# Patient Record
Sex: MALE | Race: OTHER | NOT HISPANIC OR LATINO
[De-identification: names, ages, dates, MRNs, and addresses within clinical notes are randomized per-mention and may not be internally consistent; named-entity substitution may affect disease eponyms.]

---

## 2018-05-25 ENCOUNTER — APPOINTMENT (OUTPATIENT)
Dept: UROLOGY | Facility: CLINIC | Age: 73
End: 2018-05-25
Payer: MEDICARE

## 2018-05-25 VITALS
WEIGHT: 180 LBS | RESPIRATION RATE: 17 BRPM | HEIGHT: 66 IN | HEART RATE: 94 BPM | SYSTOLIC BLOOD PRESSURE: 151 MMHG | BODY MASS INDEX: 28.93 KG/M2 | DIASTOLIC BLOOD PRESSURE: 65 MMHG

## 2018-05-25 DIAGNOSIS — Z80.9 FAMILY HISTORY OF MALIGNANT NEOPLASM, UNSPECIFIED: ICD-10-CM

## 2018-05-25 DIAGNOSIS — Z86.39 PERSONAL HISTORY OF OTHER ENDOCRINE, NUTRITIONAL AND METABOLIC DISEASE: ICD-10-CM

## 2018-05-25 DIAGNOSIS — E78.5 HYPERLIPIDEMIA, UNSPECIFIED: ICD-10-CM

## 2018-05-25 PROCEDURE — 99213 OFFICE O/P EST LOW 20 MIN: CPT

## 2018-07-18 ENCOUNTER — RESULT REVIEW (OUTPATIENT)
Age: 73
End: 2018-07-18

## 2020-02-28 ENCOUNTER — APPOINTMENT (OUTPATIENT)
Dept: UROLOGY | Facility: CLINIC | Age: 75
End: 2020-02-28
Payer: MEDICARE

## 2020-02-28 VITALS
DIASTOLIC BLOOD PRESSURE: 80 MMHG | BODY MASS INDEX: 29.73 KG/M2 | OXYGEN SATURATION: 99 % | SYSTOLIC BLOOD PRESSURE: 148 MMHG | HEART RATE: 91 BPM | WEIGHT: 185 LBS | HEIGHT: 66 IN

## 2020-02-28 DIAGNOSIS — N28.1 CYST OF KIDNEY, ACQUIRED: ICD-10-CM

## 2020-02-28 PROCEDURE — 99214 OFFICE O/P EST MOD 30 MIN: CPT

## 2020-02-28 NOTE — ASSESSMENT
[FreeTextEntry1] : patient with hx of pca and complex cyst\par states had TITO done with [facility] -- will get report\par psa not done with do today with [facility]\par killian notify of results

## 2020-02-28 NOTE — PHYSICAL EXAM
[General Appearance - Well Developed] : well developed [Normal Appearance] : normal appearance [General Appearance - Well Nourished] : well nourished [Well Groomed] : well groomed [General Appearance - In No Acute Distress] : no acute distress [Abdomen Soft] : soft [] : no hepato-splenomegaly [Abdomen Tenderness] : non-tender [Abdomen Mass (___ Cm)] : no abdominal mass palpated [Costovertebral Angle Tenderness] : no ~M costovertebral angle tenderness [Abdomen Hernia] : no hernia was discovered [FreeTextEntry1] : pvr 14 ml , office TITO showed bilat cysts, left x 2 and right up , good flow bilat , no hydro or no stones [Cervical Lymph Nodes Enlarged Anterior Bilaterally] : anterior cervical [Cervical Lymph Nodes Enlarged Posterior Bilaterally] : posterior cervical [Supraclavicular Lymph Nodes Enlarged Bilaterally] : supraclavicular

## 2020-02-28 NOTE — HISTORY OF PRESENT ILLNESS
[FreeTextEntry1] : patient with hx of pca s/p seeds 2008 and complex renal cyst\par here for f/u\par last see May 2018 , no new issues, no voiding or bowel c/o\par no blood in urine or stool\par weight stabel

## 2020-03-02 LAB
APPEARANCE: CLEAR
BACTERIA: NEGATIVE
BILIRUBIN URINE: NEGATIVE
BLOOD URINE: NEGATIVE
COLOR: NORMAL
GLUCOSE QUALITATIVE U: ABNORMAL
HYALINE CASTS: 1 /LPF
KETONES URINE: NEGATIVE
LEUKOCYTE ESTERASE URINE: NEGATIVE
MICROSCOPIC-UA: NORMAL
NITRITE URINE: NEGATIVE
PH URINE: 5.5
PROTEIN URINE: NEGATIVE
PSA SERPL-MCNC: 0.02 NG/ML
RED BLOOD CELLS URINE: 1 /HPF
SPECIFIC GRAVITY URINE: 1.01
SQUAMOUS EPITHELIAL CELLS: 1 /HPF
UROBILINOGEN URINE: NORMAL
WHITE BLOOD CELLS URINE: 0 /HPF

## 2022-04-11 ENCOUNTER — APPOINTMENT (OUTPATIENT)
Dept: GASTROENTEROLOGY | Facility: CLINIC | Age: 77
End: 2022-04-11
Payer: MEDICARE

## 2022-04-11 VITALS
WEIGHT: 171 LBS | OXYGEN SATURATION: 98 % | HEIGHT: 66 IN | TEMPERATURE: 98.4 F | DIASTOLIC BLOOD PRESSURE: 77 MMHG | SYSTOLIC BLOOD PRESSURE: 142 MMHG | BODY MASS INDEX: 27.48 KG/M2 | HEART RATE: 104 BPM

## 2022-04-11 DIAGNOSIS — K31.89 OTHER DISEASES OF STOMACH AND DUODENUM: ICD-10-CM

## 2022-04-11 DIAGNOSIS — K31.A0 GASTRIC INTESTINAL METAPLASIA, UNSPECIFIED: ICD-10-CM

## 2022-04-11 PROCEDURE — 99204 OFFICE O/P NEW MOD 45 MIN: CPT

## 2022-04-11 NOTE — PHYSICAL EXAM
[General Appearance - Alert] : alert [General Appearance - In No Acute Distress] : in no acute distress [Sclera] : the sclera and conjunctiva were normal [PERRL With Normal Accommodation] : pupils were equal in size, round, and reactive to light [Extraocular Movements] : extraocular movements were intact [Outer Ear] : the ears and nose were normal in appearance [Oropharynx] : the oropharynx was normal [Neck Appearance] : the appearance of the neck was normal [Neck Cervical Mass (___cm)] : no neck mass was observed [Jugular Venous Distention Increased] : there was no jugular-venous distention [Auscultation Breath Sounds / Voice Sounds] : lungs were clear to auscultation bilaterally [Heart Rate And Rhythm] : heart rate was normal and rhythm regular [Heart Sounds] : normal S1 and S2 [Heart Sounds Gallop] : no gallops [Murmurs] : no murmurs [Heart Sounds Pericardial Friction Rub] : no pericardial rub [Edema] : there was no peripheral edema [Bowel Sounds] : normal bowel sounds [Abdomen Soft] : soft [Abdomen Tenderness] : non-tender [] : no hepato-splenomegaly [Abdomen Mass (___ Cm)] : no abdominal mass palpated [Abnormal Walk] : normal gait [Skin Color & Pigmentation] : normal skin color and pigmentation [No Focal Deficits] : no focal deficits [Oriented To Time, Place, And Person] : oriented to person, place, and time

## 2022-04-11 NOTE — ASSESSMENT
[FreeTextEntry1] : 76M with pmhx of DM2, prostate cancer s/p seeds (2008), renal cyst, HLD presenting for evaluation of gastric nodule with intestinal metaplasia. Referred by Dr. Pressley. Had EGD 3/15/2022 for gastric IM surveillance found to have pancreatic rest, gastritis, and antral nodules. Biopsies from the antral nodule showed hyperplastic polyp, intestinal metaplasia with low grade dysplasia. Referred for endoscopic mucosal resection of nodule/polyp. Pt feels well. \par 1. Gastric antral nodule: Pathology with IM and LGD. \par - Discussed EGD with endoscopic mucosal resection of nodule with patient including risks, benefits, and alternatives i.e. bleeding, perforation, etc. Patient agreeable for procedure.\par - Schedule EGD with EMR.\par - COVID testing prior.\par \par

## 2022-04-11 NOTE — HISTORY OF PRESENT ILLNESS
[de-identified] : 76M with pmhx of DM2, prostate cancer s/p seeds (2008), renal cyst, HLD presenting for evaluation of gastric nodule with intestinal metaplasia. Referred by Dr. Pressley. Had EGD 3/15/2022 for gastric IM surveillance found to have pancreatic rest, gastritis, and antral nodules. Biopsies from the antral nodule showed hyperplastic polyp, intestinal metaplasia with low grade dysplasia. Referred for endoscopic mucosal resection of nodule/polyp. Pt feels well. Denies any complaints. Denies abd pain, n/v/d/c, melena, hematochezia, fever/chills, or other issues. \par \par PMHx: Above.\par Medications: Denies AC or antiplts, rest see chart. \par Allergies: NKDA.\par Surgical Hx: Prostate ca s/p seeds. Denies abdominal surgeries.\par SH: Denies tobacco, etoh, or drug use.\par FH: Sister with cancer, unknown type.

## 2022-04-13 ENCOUNTER — APPOINTMENT (OUTPATIENT)
Dept: SURGICAL ONCOLOGY | Facility: CLINIC | Age: 77
End: 2022-04-13
Payer: MEDICARE

## 2022-04-13 VITALS
WEIGHT: 172 LBS | OXYGEN SATURATION: 99 % | TEMPERATURE: 98.4 F | HEART RATE: 85 BPM | SYSTOLIC BLOOD PRESSURE: 150 MMHG | DIASTOLIC BLOOD PRESSURE: 74 MMHG | BODY MASS INDEX: 27.64 KG/M2 | RESPIRATION RATE: 16 BRPM | HEIGHT: 66 IN

## 2022-04-13 PROCEDURE — 99203 OFFICE O/P NEW LOW 30 MIN: CPT

## 2022-04-25 LAB — SARS-COV-2 N GENE NPH QL NAA+PROBE: NOT DETECTED

## 2022-04-25 NOTE — PHYSICAL EXAM
[Normal] : supple, no neck mass and thyroid not enlarged [Normal Neck Lymph Nodes] : normal neck lymph nodes  [Normal Supraclavicular Lymph Nodes] : normal supraclavicular lymph nodes [Normal Groin Lymph Nodes] : normal groin lymph nodes [Normal Axillary Lymph Nodes] : normal axillary lymph nodes [Normal] : normal external exam, normal sphincter tone; no palpable masses; stool guaiac negative [de-identified] : Abdomen soft, non-tender, non-distended, and no palpable masses.  [FreeTextEntry1] : no mass palpable on rectal exam, no gross blood

## 2022-04-25 NOTE — HISTORY OF PRESENT ILLNESS
[de-identified] : Mr. REBA WHITLOCK is a 76 year old male who present today for initial consultation for rectal cancer. Referred by Dr. Pressley \par PMH: HTN, HLD, DM, prostate cancer \par Family History: breast cancer in sister \par \par \par Today 4/13/22: Denies abdominal pain, bloating, nausea/vomiting, bowel habit changes, hematochezia, black sticky stools, fever, chills, night sweats or weight loss. \par \par Endoscopy and Colonoscopy 3/23/22: Rectal mass pending final pathology \par \par

## 2022-04-25 NOTE — ASSESSMENT
[FreeTextEntry1] : IMP: 76 year old male present with rectal mass suspicious for a malignancy\par \par PLAN: \par MR pelvis for rectal mass protocol for regional staging\par CT chest/abd/pelvis for staging\par CBC, CMP and CEA \par Patient advised to see med/onc Dr. Ruggiero for possible neoadjuvant chemotherapy based on MRi and EUS \par RTO 3 weeks \par \par I have discussed the risks, benefits, alternatives, complications including but not limited bleeding, infection, damage to adjacent structures, sepsis, need for further procedures, sepsis, tumor recurrence to the patient in detail. Patient expressed verbal understanding. Written informed consent to be obtained in the preoperative period \par \par I have discussed the diagnosis, therapeutic plan and options with the patient at length. Patient expressed verbal understanding to proceed with proposed plan. All questions answered. \par

## 2022-04-25 NOTE — CONSULT LETTER
[Consult Letter:] : I had the pleasure of evaluating your patient, [unfilled]. [Please see my note below.] : Please see my note below. [Consult Closing:] : Thank you very much for allowing me to participate in the care of this patient.  If you have any questions, please do not hesitate to contact me. [Sincerely,] : Sincerely, [Dear  ___] : Dear  [unfilled], [( Thank you for referring [unfilled] for consultation for _____ )] : Thank you for referring [unfilled] for consultation for [unfilled] [FreeTextEntry3] : Pernell Barcenas MD, FICS, FACS\par , Surgical Oncology \par [facility] at [facility] \par [address]\[place] [address]\par \[place] [address]\par \par (mob) [phone]\par (o) [phone]\par (f) [phone]\par

## 2022-04-26 ENCOUNTER — OUTPATIENT (OUTPATIENT)
Dept: OUTPATIENT SERVICES | Facility: HOSPITAL | Age: 77
LOS: 1 days | End: 2022-04-26
Payer: MEDICARE

## 2022-04-26 ENCOUNTER — RESULT REVIEW (OUTPATIENT)
Age: 77
End: 2022-04-26

## 2022-04-26 ENCOUNTER — TRANSCRIPTION ENCOUNTER (OUTPATIENT)
Age: 77
End: 2022-04-26

## 2022-04-26 ENCOUNTER — APPOINTMENT (OUTPATIENT)
Dept: GASTROENTEROLOGY | Facility: HOSPITAL | Age: 77
End: 2022-04-26

## 2022-04-26 DIAGNOSIS — K31.A0 GASTRIC INTESTINAL METAPLASIA, UNSPECIFIED: ICD-10-CM

## 2022-04-26 DIAGNOSIS — K31.89 OTHER DISEASES OF STOMACH AND DUODENUM: ICD-10-CM

## 2022-04-26 LAB — GLUCOSE BLDC GLUCOMTR-MCNC: 131 MG/DL — HIGH (ref 70–99)

## 2022-04-26 PROCEDURE — 82962 GLUCOSE BLOOD TEST: CPT

## 2022-04-26 PROCEDURE — 88312 SPECIAL STAINS GROUP 1: CPT | Mod: 26

## 2022-04-26 PROCEDURE — 43254 EGD ENDO MUCOSAL RESECTION: CPT

## 2022-04-26 PROCEDURE — 88305 TISSUE EXAM BY PATHOLOGIST: CPT | Mod: 26

## 2022-04-26 PROCEDURE — 88312 SPECIAL STAINS GROUP 1: CPT

## 2022-04-26 PROCEDURE — C1889: CPT

## 2022-04-26 PROCEDURE — 88305 TISSUE EXAM BY PATHOLOGIST: CPT

## 2022-04-26 DEVICE — CATH ESOPH DIL 8 ATM 6FR10-12M: Type: IMPLANTABLE DEVICE | Status: FUNCTIONAL

## 2022-04-26 DEVICE — CLIP RESOLUTION 360 235CM: Type: IMPLANTABLE DEVICE | Status: FUNCTIONAL

## 2022-04-26 DEVICE — CATH BALLOON DIL 6-8MM: Type: IMPLANTABLE DEVICE | Status: FUNCTIONAL

## 2022-04-26 DEVICE — CATH ESOPH DIL 9 ATM 6FR 8-10MM: Type: IMPLANTABLE DEVICE | Status: FUNCTIONAL

## 2022-04-26 RX ORDER — POLYETHYLENE GLYCOL 3350 AND ELECTROLYTES WITH LEMON FLAVOR 236; 22.74; 6.74; 5.86; 2.97 G/4L; G/4L; G/4L; G/4L; G/4L
236 POWDER, FOR SOLUTION ORAL
Qty: 1 | Refills: 0 | Status: ACTIVE | COMMUNITY
Start: 2022-04-18 | End: 1900-01-01

## 2022-04-28 ENCOUNTER — APPOINTMENT (OUTPATIENT)
Dept: MRI IMAGING | Facility: CLINIC | Age: 77
End: 2022-04-28
Payer: MEDICARE

## 2022-04-28 ENCOUNTER — OUTPATIENT (OUTPATIENT)
Dept: OUTPATIENT SERVICES | Facility: HOSPITAL | Age: 77
LOS: 1 days | End: 2022-04-28

## 2022-04-28 DIAGNOSIS — C20 MALIGNANT NEOPLASM OF RECTUM: ICD-10-CM

## 2022-04-28 LAB — SURGICAL PATHOLOGY STUDY: SIGNIFICANT CHANGE UP

## 2022-04-28 PROCEDURE — 72197 MRI PELVIS W/O & W/DYE: CPT | Mod: 26

## 2022-05-03 ENCOUNTER — APPOINTMENT (OUTPATIENT)
Dept: GASTROENTEROLOGY | Facility: HOSPITAL | Age: 77
End: 2022-05-03

## 2022-05-03 ENCOUNTER — OUTPATIENT (OUTPATIENT)
Dept: OUTPATIENT SERVICES | Facility: HOSPITAL | Age: 77
LOS: 1 days | End: 2022-05-03
Payer: MEDICARE

## 2022-05-03 ENCOUNTER — TRANSCRIPTION ENCOUNTER (OUTPATIENT)
Age: 77
End: 2022-05-03

## 2022-05-03 DIAGNOSIS — K62.9 DISEASE OF ANUS AND RECTUM, UNSPECIFIED: ICD-10-CM

## 2022-05-03 LAB
GLUCOSE BLDC GLUCOMTR-MCNC: 132 MG/DL — HIGH (ref 70–99)
SARS-COV-2 N GENE NPH QL NAA+PROBE: NOT DETECTED

## 2022-05-03 PROCEDURE — 45391 COLONOSCOPY W/ENDOSCOPE US: CPT

## 2022-05-03 PROCEDURE — 45381 COLONOSCOPY SUBMUCOUS NJX: CPT

## 2022-05-03 PROCEDURE — 82962 GLUCOSE BLOOD TEST: CPT

## 2022-05-04 ENCOUNTER — APPOINTMENT (OUTPATIENT)
Dept: SURGICAL ONCOLOGY | Facility: CLINIC | Age: 77
End: 2022-05-04
Payer: MEDICARE

## 2022-05-04 VITALS
TEMPERATURE: 97.7 F | DIASTOLIC BLOOD PRESSURE: 80 MMHG | HEART RATE: 70 BPM | HEIGHT: 66 IN | SYSTOLIC BLOOD PRESSURE: 153 MMHG | WEIGHT: 170 LBS | BODY MASS INDEX: 27.32 KG/M2 | RESPIRATION RATE: 17 BRPM | OXYGEN SATURATION: 99 %

## 2022-05-04 PROCEDURE — 99214 OFFICE O/P EST MOD 30 MIN: CPT

## 2022-05-05 NOTE — PHYSICAL EXAM
[Normal] : supple, no neck mass and thyroid not enlarged [Normal Neck Lymph Nodes] : normal neck lymph nodes  [Normal Supraclavicular Lymph Nodes] : normal supraclavicular lymph nodes [Normal Groin Lymph Nodes] : normal groin lymph nodes [Normal Axillary Lymph Nodes] : normal axillary lymph nodes [Normal] : oriented to person, place and time, with appropriate affect [FreeTextEntry1] : COVID-19 precautions as per [facility] policy was universally followed  [de-identified] : Abdomen soft, non-tender, non-distended, and no palpable masses.

## 2022-05-05 NOTE — HISTORY OF PRESENT ILLNESS
[de-identified] : Mr. REBA WHITLOCK is a 76 year old male who present today for follow up visit for rectal cancer. Referred by Dr. Pressley \par PMH: HTN, HLD, DM, prostate cancer \par Family History: breast cancer in sister \par \par  4/13/22: Denies abdominal pain, bloating, nausea/vomiting, bowel habit changes, hematochezia, black sticky stools, fever, chills, night sweats or weight loss. \par \par Endoscopy and Colonoscopy 3/23/22: Rectal mass pending final pathology \par \par EUS 4/26/22: T3 N0 \par  \par MRI pelvis 4/26/22: 3.0 cm right anterior mid to lower rectal tumor located 5.5 cm from the anal verge and 1.2 cm from the top anal sphincter. 6 mm extension beyond the muscularis propria along the right anterior wall reaching the circumferential resection margin. Stage T3 N0. CRM: involved. Sphincter: absent \par \par Today 5/4/22: Denies abdominal pain, bloating, nausea/vomiting, bowel habit changes, hematochezia, black sticky stools, fever, chills, night sweats or weight loss. \par \par \par 
no

## 2022-05-05 NOTE — CONSULT LETTER
[Courtesy Letter:] : I had the pleasure of seeing your patient, [unfilled], in my office today. [Please see my note below.] : Please see my note below. [Consult Closing:] : Thank you very much for allowing me to participate in the care of this patient.  If you have any questions, please do not hesitate to contact me. [Sincerely,] : Sincerely, [Dear  ___] : Dear  [unfilled], [( Thank you for referring [unfilled] for consultation for _____ )] : Thank you for referring [unfilled] for consultation for [unfilled] [FreeTextEntry3] : Pernell Barcenas MD, FICS, FACS\par Director of Surgical Oncology- [facility] \par , Department of Surgery  \par [facility] at [facility] \par [address]\[place] [address]\par \par [address]\[place] [address]\par \par [address]\[place] [address]\par \par (mob) [phone]\par (o) [phone]\par (f) [phone]\par  \par

## 2022-05-05 NOTE — ASSESSMENT
[FreeTextEntry1] : IMP: 76 year old male original presented with rectal mass suspicious for a malignancy\par EUS: T3N0 \par MRI pelvis 4/26/22: 3.0 cm right anterior mid to lower rectal tumor located 5.5 cm from the anal verge and 1.2 cm from the top anal sphincter. 6 mm extension beyond the muscularis propria along the right anterior wall reaching the circumferential resection margin. Stage T3 N0. CRM: involved. Sphincter involvement: absent \par \par PLAN: \par CT chest/abd/pelvis for staging\par Patient advised to see med/onc Dr. Ruggiero for neoadjuvant chemotherapy and Dr. Vieira for radiation therapy \par Will present in GI tumor board. \par RTO 3months \par I have discussed the diagnosis, therapeutic plan and options with the patient at length. Patient expressed verbal understanding to proceed with proposed plan. All questions answered. \par

## 2022-05-06 ENCOUNTER — APPOINTMENT (OUTPATIENT)
Dept: RADIATION ONCOLOGY | Facility: CLINIC | Age: 77
End: 2022-05-06
Payer: MEDICARE

## 2022-05-06 VITALS — RESPIRATION RATE: 18 BRPM | BODY MASS INDEX: 27.32 KG/M2 | HEIGHT: 66 IN | WEIGHT: 170 LBS

## 2022-05-06 DIAGNOSIS — Z78.9 OTHER SPECIFIED HEALTH STATUS: ICD-10-CM

## 2022-05-06 DIAGNOSIS — I10 ESSENTIAL (PRIMARY) HYPERTENSION: ICD-10-CM

## 2022-05-06 DIAGNOSIS — Z83.3 FAMILY HISTORY OF DIABETES MELLITUS: ICD-10-CM

## 2022-05-06 PROCEDURE — 99204 OFFICE O/P NEW MOD 45 MIN: CPT | Mod: 25

## 2022-05-09 NOTE — PHYSICAL EXAM
[Normal] : oriented to person, place and time, the affect was normal, the mood was normal and not anxious [FreeTextEntry1] : Deferred

## 2022-05-09 NOTE — HISTORY OF PRESENT ILLNESS
[FreeTextEntry1] : 75 y/o male with HTN, DM2, HLD, GERD, rectal cancer presents to discuss radiation options for rectal cancer. \par \par 3/2022 -- EGD and colonoscopy with GI Dr Pressley ([phone]) showed rectal invasive adenocarcinoma, moderately differentiated. Slides were reviewed @ [facility] in 4/2022. \par \par 4/2022 -- EGD showed Final Diagnosis\par 1. Lesser curvature nodule; status post endoscopic mucosal resection: - Chronic mildly active gastritis with areas of incomplete intestinal metaplasia and regenerative foveolar hyperplasia.  - Cresyl violet stain is negative for H pylori-like microorganisms.  - There is no evidence of dysplasia. - Submucosal lipoma.\par 2. Greater curvature nodule; status post endoscopic mucosal resection: - Chronic mildly active gastritis with foci of partially complete as well as incomplete intestinal metaplasia. - Cresyl violet stain is negative for H pylori-like microorganisms. - There is no evidence of dysplasia.\par \par 5/6/2022 Today for consultation. No abd pain, diarrhea, rectal bleeding, constipation, weight loss. \par \par

## 2022-05-12 ENCOUNTER — OUTPATIENT (OUTPATIENT)
Dept: OUTPATIENT SERVICES | Facility: HOSPITAL | Age: 77
LOS: 1 days | End: 2022-05-12
Payer: MEDICARE

## 2022-05-12 ENCOUNTER — APPOINTMENT (OUTPATIENT)
Dept: CT IMAGING | Facility: CLINIC | Age: 77
End: 2022-05-12
Payer: MEDICARE

## 2022-05-12 DIAGNOSIS — C20 MALIGNANT NEOPLASM OF RECTUM: ICD-10-CM

## 2022-05-12 PROCEDURE — 74177 CT ABD & PELVIS W/CONTRAST: CPT | Mod: 26

## 2022-05-12 PROCEDURE — 74177 CT ABD & PELVIS W/CONTRAST: CPT

## 2022-05-12 PROCEDURE — 71260 CT THORAX DX C+: CPT | Mod: 26

## 2022-05-12 PROCEDURE — 71260 CT THORAX DX C+: CPT

## 2022-05-23 ENCOUNTER — OUTPATIENT (OUTPATIENT)
Dept: OUTPATIENT SERVICES | Facility: HOSPITAL | Age: 77
LOS: 1 days | End: 2022-05-23
Payer: MEDICARE

## 2022-05-23 ENCOUNTER — RESULT REVIEW (OUTPATIENT)
Age: 77
End: 2022-05-23

## 2022-05-23 DIAGNOSIS — C20 MALIGNANT NEOPLASM OF RECTUM: ICD-10-CM

## 2022-05-23 PROCEDURE — 88321 CONSLTJ&REPRT SLD PREP ELSWR: CPT

## 2022-05-24 LAB — SURGICAL PATHOLOGY STUDY: SIGNIFICANT CHANGE UP

## 2022-06-02 ENCOUNTER — NON-APPOINTMENT (OUTPATIENT)
Age: 77
End: 2022-06-02

## 2022-06-02 DIAGNOSIS — Z92.3 PERSONAL HISTORY OF IRRADIATION: ICD-10-CM

## 2022-06-06 ENCOUNTER — FORM ENCOUNTER (OUTPATIENT)
Age: 77
End: 2022-06-06

## 2022-06-06 NOTE — HISTORY OF PRESENT ILLNESS
[FreeTextEntry1] : 75 y/o male with HTN, DM2, HLD, GERD, seeds implant for prostate cancer in 2008, rectal cancer presents to discuss radiation options for rectal cancer. \par \par 3/2022 -- EGD and colonoscopy with GI Dr Pressley ([phone]) showed rectal invasive adenocarcinoma, moderately differentiated. Slides were reviewed @ [facility] in 4/2022. \par \par 4/2022 -- EGD showed Final Diagnosis\par 1. Lesser curvature nodule; status post endoscopic mucosal resection: - Chronic mildly active gastritis with areas of incomplete intestinal metaplasia and regenerative foveolar hyperplasia.  - Cresyl violet stain is negative for H pylori-like microorganisms.  - There is no evidence of dysplasia. - Submucosal lipoma.\par 2. Greater curvature nodule; status post endoscopic mucosal resection: - Chronic mildly active gastritis with foci of partially complete as well as incomplete intestinal metaplasia. - Cresyl violet stain is negative for H pylori-like microorganisms. - There is no evidence of dysplasia.\par \par 5/6/2022 Today for consultation. No abd pain, diarrhea, rectal bleeding, constipation, weight loss. \par \par 2/5 fractions of radiation to rectal cancer completed. No abd pain, diarrhea, urinary bother. Pt will see Med Onc and /or surgery service for further treatments after completing RT.\par \par

## 2022-06-06 NOTE — DISEASE MANAGEMENT
[Clinical] : TNM Stage: c [II] : II [TTNM] : 3 [NTNM] : 0 [MTNM] : 0 [de-identified] : 25 Gy [de-identified] : pelvis/ rectum

## 2022-06-06 NOTE — REVIEW OF SYSTEMS
[Anal Pain: Grade 0] : Anal Pain: Grade 0 [Constipation: Grade 0] : Constipation: Grade 0 [Diarrhea: Grade 0] : Diarrhea: Grade 0 [Nausea: Grade 0] : Nausea: Grade 0 [Proctitis: Grade 0] : Proctitis: Grade 0 [Rectal Pain: Grade 0] : Rectal Pain: Grade 0 [Hematuria: Grade 0] : Hematuria: Grade 0 [Urinary Incontinence: Grade 0] : Urinary Incontinence: Grade 0  [Urinary Retention: Grade 0] : Urinary Retention: Grade 0 [Urinary Tract Pain: Grade 0] : Urinary Tract Pain: Grade 0 [Urinary Urgency: Grade 0] : Urinary Urgency: Grade 0 [Urinary Frequency: Grade 0] : Urinary Frequency: Grade 0 [Negative] : Allergic/Immunologic [FreeTextEntry7] : rectal cancer diagnosed in 4/2022.

## 2022-06-07 ENCOUNTER — NON-APPOINTMENT (OUTPATIENT)
Age: 77
End: 2022-06-07

## 2022-06-13 NOTE — DISEASE MANAGEMENT
[Clinical] : TNM Stage: c [II] : II [TTNM] : 3 [NTNM] : 0 [MTNM] : 0 [de-identified] : 25 Gy [de-identified] : pelvis/ rectum

## 2022-06-13 NOTE — HISTORY OF PRESENT ILLNESS
[FreeTextEntry1] : 77 y/o male with HTN, DM2, HLD, GERD, seeds implant for prostate cancer in 2008, rectal cancer presents to discuss radiation options for rectal cancer. \par \par 3/2022 -- EGD and colonoscopy with GI Dr Pressley ([phone]) showed rectal invasive adenocarcinoma, moderately differentiated. Slides were reviewed @ [facility] in 4/2022. \par \par 4/2022 -- EGD showed Final Diagnosis\par 1. Lesser curvature nodule; status post endoscopic mucosal resection: - Chronic mildly active gastritis with areas of incomplete intestinal metaplasia and regenerative foveolar hyperplasia.  - Cresyl violet stain is negative for H pylori-like microorganisms.  - There is no evidence of dysplasia. - Submucosal lipoma.\par 2. Greater curvature nodule; status post endoscopic mucosal resection: - Chronic mildly active gastritis with foci of partially complete as well as incomplete intestinal metaplasia. - Cresyl violet stain is negative for H pylori-like microorganisms. - There is no evidence of dysplasia.\par \par 5/6/2022 Today for consultation. No abd pain, diarrhea, rectal bleeding, constipation, weight loss. \par \par 5/5 fractions of radiation to rectal cancer completed. No abd pain, diarrhea, urinary bother. Pt will see Med Onc and /or surgery service for further treatments after completing RT.\par \par

## 2022-07-05 NOTE — REASON FOR VISIT
[Post-Treatment Evaluation] : post-treatment evaluation for [Rectal Cancer] : cancer [Spouse] : spouse pt encountered no si, no hi , no delusions no hallucinations nl gait requesting  at [facility]

## 2022-07-06 ENCOUNTER — APPOINTMENT (OUTPATIENT)
Dept: SURGICAL ONCOLOGY | Facility: CLINIC | Age: 77
End: 2022-07-06

## 2022-07-06 VITALS
DIASTOLIC BLOOD PRESSURE: 72 MMHG | RESPIRATION RATE: 16 BRPM | HEIGHT: 66 IN | SYSTOLIC BLOOD PRESSURE: 149 MMHG | OXYGEN SATURATION: 97 % | HEART RATE: 89 BPM | BODY MASS INDEX: 26.52 KG/M2 | WEIGHT: 165 LBS | TEMPERATURE: 98.3 F

## 2022-07-06 PROCEDURE — 99213 OFFICE O/P EST LOW 20 MIN: CPT

## 2022-07-08 ENCOUNTER — APPOINTMENT (OUTPATIENT)
Dept: RADIATION ONCOLOGY | Facility: CLINIC | Age: 77
End: 2022-07-08

## 2022-07-08 PROCEDURE — 99024 POSTOP FOLLOW-UP VISIT: CPT

## 2022-07-11 ENCOUNTER — EMERGENCY (EMERGENCY)
Facility: HOSPITAL | Age: 77
LOS: 1 days | Discharge: HOME | End: 2022-07-11
Attending: STUDENT IN AN ORGANIZED HEALTH CARE EDUCATION/TRAINING PROGRAM
Payer: MEDICARE

## 2022-07-11 ENCOUNTER — APPOINTMENT (OUTPATIENT)
Dept: INTERVENTIONAL RADIOLOGY/VASCULAR | Facility: HOSPITAL | Age: 77
End: 2022-07-11

## 2022-07-11 ENCOUNTER — OUTPATIENT (OUTPATIENT)
Dept: OUTPATIENT SERVICES | Facility: HOSPITAL | Age: 77
LOS: 1 days | End: 2022-07-11
Payer: MEDICARE

## 2022-07-11 ENCOUNTER — TRANSCRIPTION ENCOUNTER (OUTPATIENT)
Age: 77
End: 2022-07-11

## 2022-07-11 ENCOUNTER — RESULT REVIEW (OUTPATIENT)
Age: 77
End: 2022-07-11

## 2022-07-11 VITALS
DIASTOLIC BLOOD PRESSURE: 72 MMHG | SYSTOLIC BLOOD PRESSURE: 163 MMHG | TEMPERATURE: 99 F | RESPIRATION RATE: 18 BRPM | OXYGEN SATURATION: 98 % | HEART RATE: 86 BPM | HEIGHT: 66 IN | WEIGHT: 164.69 LBS

## 2022-07-11 VITALS
SYSTOLIC BLOOD PRESSURE: 135 MMHG | RESPIRATION RATE: 18 BRPM | DIASTOLIC BLOOD PRESSURE: 73 MMHG | TEMPERATURE: 98 F | OXYGEN SATURATION: 99 % | HEART RATE: 77 BPM

## 2022-07-11 VITALS
DIASTOLIC BLOOD PRESSURE: 76 MMHG | TEMPERATURE: 98 F | RESPIRATION RATE: 17 BRPM | HEART RATE: 66 BPM | OXYGEN SATURATION: 98 % | SYSTOLIC BLOOD PRESSURE: 153 MMHG

## 2022-07-11 DIAGNOSIS — C20 MALIGNANT NEOPLASM OF RECTUM: ICD-10-CM

## 2022-07-11 LAB
GLUCOSE BLDC GLUCOMTR-MCNC: 169 MG/DL — HIGH (ref 70–99)
GLUCOSE BLDC GLUCOMTR-MCNC: 183 MG/DL — HIGH (ref 70–99)

## 2022-07-11 PROCEDURE — C1788: CPT

## 2022-07-11 PROCEDURE — 36558 INSERT TUNNELED CV CATH: CPT

## 2022-07-11 PROCEDURE — 82962 GLUCOSE BLOOD TEST: CPT

## 2022-07-11 PROCEDURE — C1751: CPT

## 2022-07-11 PROCEDURE — 70450 CT HEAD/BRAIN W/O DYE: CPT | Mod: 26,MA

## 2022-07-11 PROCEDURE — 77001 FLUOROGUIDE FOR VEIN DEVICE: CPT

## 2022-07-11 PROCEDURE — 99284 EMERGENCY DEPT VISIT MOD MDM: CPT | Mod: 25

## 2022-07-11 PROCEDURE — C1769: CPT

## 2022-07-11 PROCEDURE — 76937 US GUIDE VASCULAR ACCESS: CPT

## 2022-07-11 PROCEDURE — 99284 EMERGENCY DEPT VISIT MOD MDM: CPT

## 2022-07-11 PROCEDURE — 76937 US GUIDE VASCULAR ACCESS: CPT | Mod: 26

## 2022-07-11 PROCEDURE — 70450 CT HEAD/BRAIN W/O DYE: CPT | Mod: MA

## 2022-07-11 PROCEDURE — 77001 FLUOROGUIDE FOR VEIN DEVICE: CPT | Mod: 26

## 2022-07-11 RX ORDER — ONDANSETRON 8 MG/1
4 TABLET, FILM COATED ORAL ONCE
Refills: 0 | Status: DISCONTINUED | OUTPATIENT
Start: 2022-07-11 | End: 2022-07-11

## 2022-07-11 RX ORDER — ACETAMINOPHEN 500 MG
1000 TABLET ORAL ONCE
Refills: 0 | Status: DISCONTINUED | OUTPATIENT
Start: 2022-07-11 | End: 2022-07-11

## 2022-07-11 NOTE — ASU DISCHARGE PLAN (ADULT/PEDIATRIC) - NS MD DC FALL RISK RISK
For information on Fall & Injury Prevention, visit: [email or web address] OR  [email or web address] OR  https://www.cdc.gov/steadi/patient.html

## 2022-07-11 NOTE — ED PROVIDER NOTE - PHYSICAL EXAMINATION
Gen: NAD, AOx3, able to make needs known, non-toxic  Head: Normocephalic, small abrasion below L eye  HEENT: EOMI, oral mucosa moist, normal conjunctiva. No pain or diplopia   Lung: CTAB, no respiratory distress, no wheezes/rhonchi/rales B/L, speaking in full sentences  CV: RRR, no murmurs  Abd: non distended, soft, nontender, no guarding, no CVA tenderness  MSK: no visible deformities. no midline spinal tenderness  Neuro: Appears non focal. CN 2-12 grossly intact b/l. Str 5/5 x4. No appreciable sensory deficits. Normal gait  Skin: Warm, well perfused  Psych: normal affect

## 2022-07-11 NOTE — ASU DISCHARGE PLAN (ADULT/PEDIATRIC) - OK TO LEAVE MESSAGE ON VOICEMAIL
Pt admitted for weakness; seen for malnutrition follow up.  Chart reviewed, interim events noted.  Source: Patient, Family members (2 daughters at bedside), Medical record    Per 10/30 speech and swallow evaluation, puree texture diet with thin liquids is recommended with 100% supervision during meals.  Nephrology is following for NITA management; urology is following for hematuria.  Per 10/31 infectious disease note, pt's leukocytosis is being monitored off antibiotics; family doesn't want intervention for abdominal distention; attributes weakness to multifactorial reasons (including age).      Family reported pt's fair appetite with <50% PO intakes; pt has taken bites of Ensure Pudding but dislikes vanilla flavor; willing to try Chocolate Ensure pudding.  Pt is tolerating pureed texture consistency well.  Family states that pt's constipation is ongoing with multiple BM on 10/31 and 10/28.  Pt agreed to try Chocolate Glucerna; will follow up with NP to recommend Glucerna BID.      Pt has stage II pressure ulcer on left buttock and +1 edema on b/l ankles. Pt initial assessment on 10/30 for pressure injury consult; seen today for malnutrition follow up.  Chart reviewed, interim events noted.  Medications and lab values noted.  Source: Patient, Family members (2 daughters at bedside), Medical record    Per 10/30 speech and swallow evaluation, puree texture diet with thin liquids is recommended with 100% supervision during meals.  Nephrology is following for NITA management; urology is following for hematuria.  Per 10/31 infectious disease note, pt's leukocytosis is being monitored off antibiotics; family doesn't want intervention for abdominal distention; attributes weakness to multifactorial reasons (including age).      Family reported pt's fair appetite with <50% PO intakes; pt has taken bites of Ensure Pudding but dislikes vanilla flavor; willing to try Chocolate Ensure pudding.  Pt is tolerating pureed texture consistency well.  Family states that pt's constipation is ongoing with multiple BM on 10/31 and 10/28.  Pt agreed to try Chocolate Glucerna to supplement calories and protein.    Pt has stage II pressure ulcer on left buttock and +1 edema on b/l ankles.    Nutrition Diagnosis: Moderate malnutrition in context of chronic illness - care plan in progress; being addressed by noting Ensure pudding flavor preference in CBORD and talking to NP regarding Glucerna BID recommendation    Pt and family made aware that RD will be made available. Pt initial assessment on 10/30 for pressure injury consult; seen today for malnutrition follow up.  Chart reviewed, interim events noted.  Medications and lab values noted.  Source: Patient, Family members (2 daughters at bedside), Medical record    Per 10/30 speech and swallow evaluation, puree texture diet with thin liquids is recommended with 100% supervision during meals.  Nephrology is following for NITA management; urology is following for hematuria.  Per 10/31 infectious disease note, pt's leukocytosis is being monitored off antibiotics; family doesn't want intervention for abdominal distention; attributes weakness to multifactorial reasons (including age).      Family reported pt's fair appetite with <50% PO intakes; pt has taken bites of Ensure Pudding but dislikes vanilla flavor; willing to try Chocolate Ensure pudding.  Pt is tolerating pureed texture consistency well.  Family states that pt's constipation is ongoing with multiple BM on 10/31 and 10/28.  Pt agreed to try Chocolate Glucerna to supplement calories and protein.  Noted that pt was craving fresh fruit (oranges, bananas); family and pt made aware that diet consistency follows speech and swallow recommendations, and can discuss any further concerns with the doctor.    Pt has stage II pressure ulcer on left buttock and +1 edema on b/l ankles.    Nutrition Diagnosis: Moderate malnutrition in context of chronic illness - care plan in progress; being addressed by noting Ensure pudding flavor preference in CBORD and talking to NP regarding Glucerna BID recommendation    Pt and family made aware that RD will be made available. Yes

## 2022-07-11 NOTE — ED PROVIDER NOTE - CLINICAL SUMMARY MEDICAL DECISION MAKING FREE TEXT BOX
78 y/o M w/ PMH as above presenting w/ fall. Pt well appearing, no acute distress. Neuro intact on exam. Will obtain CTH to r/o acute pathology. No signs of max/face fractures. Apply bacitracin to wound. Pt denying need for pain medications. Will reassess the need for additional interventions as clinically warranted.

## 2022-07-11 NOTE — ED PROVIDER NOTE - NSFOLLOWUPINSTRUCTIONS_ED_ALL_ED_FT
1) Follow up with your doctor this week  2) Return to the ED immediately for new or worsening symptoms   3) Please continue to take any home medications as prescribed  4) Your test results from your ED visit were discussed with you prior to discharge  5) You were provided with a copy of your test results  6) Please take Tylenol 650 mg every 4 hours as needed for pain. Please do not exceed more than 4,000mg of Tylenol in a day

## 2022-07-11 NOTE — ED PROVIDER NOTE - OBJECTIVE STATEMENT
78 y/o M w/ PMH of HLD, DM, colon cancer s/p radiation presenting w/ fall. Seen w/ wife. Reports was on his way to hospital this morning, has appointment to have port placed to start chemo, and while walking his shoe got stuck causing him to fall. Fell forward, hit his face. Witnessed by wife, no LOC. Went to pre-surgical area and was brought to ED for eval. Denies AC use. Reports feeling at baseline state of health at this time. Reports tetanus vaccine is UTD. Denies fevers, chills, headache, dizziness, blurred vision, chest pain, cough, shortness of breath, abdominal pain, n/v/d/c, urinary symptoms, MSK pain, rash.

## 2022-07-11 NOTE — ED PROVIDER NOTE - PROGRESS NOTE DETAILS
Attending Marianela: CT w/o acute findings. Pt stable to be discharged to go to IR for port placement. IR spoke w/ pt and will see him upstairs. Return precautions provided, pt verbalized understanding. Ready for DC.

## 2022-07-11 NOTE — ED PROVIDER NOTE - PATIENT PORTAL LINK FT
You can access the FollowMyHealth Patient Portal offered by [facility] by registering at the following website: [email or web address]. By joining [employer]’s FollowMyHealth portal, you will also be able to view your health information using other applications (apps) compatible with our system.

## 2022-07-12 NOTE — PHYSICAL EXAM
[FreeTextEntry1] : COVID-19 precautions as per [facility] policy was universally followed  [de-identified] : Abdomen soft, non-tender, non-distended, and no palpable masses.

## 2022-07-12 NOTE — ASSESSMENT
[FreeTextEntry1] : IMP: 77 year old male original presented with rectal mass suspicious for a malignancy\par EUS: T3N0\par MRI pelvis 4/26/22: 3.0 cm right anterior mid to lower rectal tumor located 5.5 cm from the anal verge and 1.2 cm from the top anal sphincter. 6 mm extension beyond the muscularis propria along the right anterior wall reaching the circumferential resection margin. \par Stage T3c N0. CRM: involved. Sphincter involvement: absent \par \par Staging CT C/A/P- no evidence of metastatic disease\par \par S/p neoadjuvant RT concluded ~6/6/22- short course radiation \par \par Ildefonso is planned to have a port placed on 7/11/2022.  \par Patient is on total neoadjuvant therapy regimen with short course RT followed by 12-16 weeks of FOLFOX, will restage after completing FOLFOX\par \par PLAN: \par Continue f/u & chemotherapy with Dr. Daniel, discussed with Dr. Daniel\par RTO after completing chemotherapy & restaging imaging (CT C/A/P & MR rectum)\par \par I have discussed the diagnosis, therapeutic plan and options with the patient at length. Patient expressed verbal understanding to proceed with proposed plan. All questions answered. \par

## 2022-07-12 NOTE — HISTORY OF PRESENT ILLNESS
[de-identified] : Mr. REBA WHITLOCK is a 77 year old male who present today for follow up visit for rectal cancer. Referred by Dr. Pressley \par PMH: HTN, HLD, DM, prostate cancer \par Family History: breast cancer in sister \par \par  4/13/22: Denies abdominal pain, bloating, nausea/vomiting, bowel habit changes, hematochezia, black sticky stools, fever, chills, night sweats or weight loss. \par \par Endoscopy and Colonoscopy 3/23/22: Rectal mass pending final pathology \par \par EUS 4/26/22: T3 N0 \par  \par MRI pelvis 4/26/22: 3.0 cm right anterior mid to lower rectal tumor located 5.5 cm from the anal verge and 1.2 cm from the top anal sphincter. 6 mm extension beyond the muscularis propria along the right anterior wall reaching the circumferential resection margin. Stage T3 N0. CRM: involved. Sphincter: absent \par \par CT chest/abdomen/pelvis performed on 5/12/22 showed no evidence of metastatic disease.\par \par Patient presented at rectal tumor board.  He began neoadjuvant radiation on 6/1/22 under the care of Dr. Vieira, completed treatment 6/7/2022. Plans to have port placed & start FOLFOX with Dr. Daniel. \par Patient is on total neoadjuvant therapy regimen with short course RT followed by 12-16 weeks of FOLFOX, will restage after \par

## 2022-07-12 NOTE — CONSULT LETTER
[Dear  ___] : Dear  [unfilled], [Consult Letter:] : I had the pleasure of evaluating your patient, [unfilled]. [Please see my note below.] : Please see my note below. [Consult Closing:] : Thank you very much for allowing me to participate in the care of this patient.  If you have any questions, please do not hesitate to contact me. [Sincerely,] : Sincerely, [DrMichel  ___] : Dr. FORRESTER [DrMichel ___] : Dr. FORRESTER [( Thank you for referring [unfilled] for consultation for _____ )] : Thank you for referring [unfilled] for consultation for [unfilled] [FreeTextEntry2] : Trey Pressley MD [FreeTextEntry3] : Pernell Barcenas MD, FICS, FACS\par Director of Surgical Oncology- [facility] \par , Department of Surgery  \par [facility] [employer] [facility] \par [address]\[place] [address]\par \par [address]\[place] [address]\par \par [address]\[place] [address]\par \par (mob) [phone]\par (o) [phone]\par (f) [phone]\par \par

## 2022-07-15 NOTE — HISTORY OF PRESENT ILLNESS
[FreeTextEntry1] : 77 y/o male with HTN, DM2, HLD, GERD, seeds implant for prostate cancer in 2008, radiation treatment (25 Gy in 5 Fx) rectal cancer in 6/2022 presents for f/u.\par \par 3/2022 -- EGD and colonoscopy with GI Dr Pressley ([phone]) showed rectal invasive adenocarcinoma, moderately differentiated. Slides were reviewed @ [facility] in 4/2022. \par \par 4/2022 -- EGD showed Final Diagnosis\par 1. Lesser curvature nodule; status post endoscopic mucosal resection: - Chronic mildly active gastritis with areas of incomplete intestinal metaplasia and regenerative foveolar hyperplasia.  - Cresyl violet stain is negative for H pylori-like microorganisms.  - There is no evidence of dysplasia. - Submucosal lipoma.\par 2. Greater curvature nodule; status post endoscopic mucosal resection: - Chronic mildly active gastritis with foci of partially complete as well as incomplete intestinal metaplasia. - Cresyl violet stain is negative for H pylori-like microorganisms. - There is no evidence of dysplasia.\par \par 5/6/2022 Today for consultation. No abd pain, diarrhea, rectal bleeding, constipation, weight loss. \par \par Last OTV note during radiation in 6/2022: 5/5 fractions of radiation to rectal cancer completed. No abd pain, diarrhea, urinary bother. Pt will see Med Onc and /or surgery service for further treatments after completing RT.\par \par Pt completed 25 Gy /5Fx of radiation to rectal cancer in 6/2022. Today for f/u. \par \par

## 2022-07-15 NOTE — REVIEW OF SYSTEMS
[Negative] : Allergic/Immunologic [Anal Pain: Grade 0] : Anal Pain: Grade 0 [Constipation: Grade 0] : Constipation: Grade 0 [Diarrhea: Grade 0] : Diarrhea: Grade 0 [Nausea: Grade 0] : Nausea: Grade 0 [Proctitis: Grade 0] : Proctitis: Grade 0 [Rectal Pain: Grade 0] : Rectal Pain: Grade 0 [Hematuria: Grade 0] : Hematuria: Grade 0 [Urinary Incontinence: Grade 0] : Urinary Incontinence: Grade 0  [Urinary Retention: Grade 0] : Urinary Retention: Grade 0 [Urinary Tract Pain: Grade 0] : Urinary Tract Pain: Grade 0 [Urinary Urgency: Grade 0] : Urinary Urgency: Grade 0 [Urinary Frequency: Grade 0] : Urinary Frequency: Grade 0 [FreeTextEntry7] : rectal cancer diagnosed in 4/2022.

## 2022-07-15 NOTE — DISEASE MANAGEMENT
[Clinical] : TNM Stage: c [II] : II [TTNM] : 3 [NTNM] : 0 [MTNM] : 0 [de-identified] : 25 Gy [de-identified] : pelvis/ rectum

## 2022-10-14 ENCOUNTER — APPOINTMENT (OUTPATIENT)
Dept: RADIATION ONCOLOGY | Facility: CLINIC | Age: 77
End: 2022-10-14

## 2022-10-28 ENCOUNTER — APPOINTMENT (OUTPATIENT)
Dept: RADIATION ONCOLOGY | Facility: CLINIC | Age: 77
End: 2022-10-28

## 2022-10-28 VITALS — WEIGHT: 157 LBS | RESPIRATION RATE: 18 BRPM | HEIGHT: 66 IN | BODY MASS INDEX: 25.23 KG/M2

## 2022-10-28 PROCEDURE — 99214 OFFICE O/P EST MOD 30 MIN: CPT

## 2022-10-31 NOTE — DISEASE MANAGEMENT
[Clinical] : TNM Stage: c [II] : II [TTNM] : 3 [NTNM] : 0 [MTNM] : 0 [de-identified] : 25 Gy [de-identified] : pelvis/ rectum

## 2022-10-31 NOTE — HISTORY OF PRESENT ILLNESS
[FreeTextEntry1] : 77 y/o male with HTN, DM2, HLD, GERD, seeds implant for prostate cancer in 2008, radiation treatment (25 Gy in 5 Fx) rectal cancer in 6/2022 presents for f/u.\par \par 3/2022 -- EGD and colonoscopy with GI Dr Pressley ([phone]) showed rectal invasive adenocarcinoma, moderately differentiated. Slides were reviewed @ [facility] in 4/2022. \par \par 4/2022 -- EGD showed Final Diagnosis\par 1. Lesser curvature nodule; status post endoscopic mucosal resection: - Chronic mildly active gastritis with areas of incomplete intestinal metaplasia and regenerative foveolar hyperplasia.  - Cresyl violet stain is negative for H pylori-like microorganisms.  - There is no evidence of dysplasia. - Submucosal lipoma.\par 2. Greater curvature nodule; status post endoscopic mucosal resection: - Chronic mildly active gastritis with foci of partially complete as well as incomplete intestinal metaplasia. - Cresyl violet stain is negative for H pylori-like microorganisms. - There is no evidence of dysplasia.\par \par 5/6/2022 Today for consultation. No abd pain, diarrhea, rectal bleeding, constipation, weight loss. \par \par Last OTV note during radiation in 6/2022: 5/5 fractions of radiation to rectal cancer completed. No abd pain, diarrhea, urinary bother. Pt will see Med Onc and /or surgery service for further treatments after completing RT.\par \par Today for f/u. Pt completed 25 Gy /5Fx of radiation to rectal cancer in 6/2022. Still on chemo with Dr Daniel @ [facility]. No abd pain, diarrhea, nausea.\par \par

## 2022-12-27 ENCOUNTER — APPOINTMENT (OUTPATIENT)
Dept: RADIATION ONCOLOGY | Facility: CLINIC | Age: 77
End: 2022-12-27

## 2022-12-29 ENCOUNTER — INPATIENT (INPATIENT)
Facility: HOSPITAL | Age: 77
LOS: 2 days | Discharge: HOME | DRG: 640 | End: 2023-01-01
Attending: STUDENT IN AN ORGANIZED HEALTH CARE EDUCATION/TRAINING PROGRAM | Admitting: STUDENT IN AN ORGANIZED HEALTH CARE EDUCATION/TRAINING PROGRAM
Payer: MEDICARE

## 2022-12-29 VITALS
DIASTOLIC BLOOD PRESSURE: 42 MMHG | WEIGHT: 138.01 LBS | SYSTOLIC BLOOD PRESSURE: 84 MMHG | HEIGHT: 66 IN | RESPIRATION RATE: 16 BRPM | HEART RATE: 116 BPM | TEMPERATURE: 99 F | OXYGEN SATURATION: 100 %

## 2022-12-29 LAB
ALBUMIN SERPL ELPH-MCNC: 4.2 G/DL — SIGNIFICANT CHANGE UP (ref 3.3–5)
ALP SERPL-CCNC: 64 U/L — SIGNIFICANT CHANGE UP (ref 40–120)
ALT FLD-CCNC: 44 U/L — SIGNIFICANT CHANGE UP (ref 10–45)
ANION GAP SERPL CALC-SCNC: 17 MMOL/L — SIGNIFICANT CHANGE UP (ref 5–17)
AST SERPL-CCNC: 32 U/L — SIGNIFICANT CHANGE UP (ref 10–40)
BASE EXCESS BLDV CALC-SCNC: -8.3 MMOL/L — LOW (ref -2–3)
BILIRUB SERPL-MCNC: 0.3 MG/DL — SIGNIFICANT CHANGE UP (ref 0.2–1.2)
BLOOD GAS VENOUS - CREATININE: SIGNIFICANT CHANGE UP MG/DL (ref 0.5–1.3)
BUN SERPL-MCNC: 39 MG/DL — HIGH (ref 7–23)
CA-I SERPL-SCNC: 1.34 MMOL/L — HIGH (ref 1.15–1.33)
CALCIUM SERPL-MCNC: 10.5 MG/DL — SIGNIFICANT CHANGE UP (ref 8.4–10.5)
CHLORIDE BLDV-SCNC: 97 MMOL/L — SIGNIFICANT CHANGE UP (ref 96–108)
CHLORIDE SERPL-SCNC: 97 MMOL/L — SIGNIFICANT CHANGE UP (ref 96–108)
CO2 BLDV-SCNC: 18 MMOL/L — LOW (ref 22–26)
CO2 SERPL-SCNC: 16 MMOL/L — LOW (ref 22–31)
CREAT SERPL-MCNC: 1.38 MG/DL — HIGH (ref 0.5–1.3)
EGFR: 53 ML/MIN/1.73M2 — LOW
GAS PNL BLDV: 130 MMOL/L — LOW (ref 136–145)
GAS PNL BLDV: SIGNIFICANT CHANGE UP
GAS PNL BLDV: SIGNIFICANT CHANGE UP
GLUCOSE BLDV-MCNC: 245 MG/DL — HIGH (ref 70–99)
GLUCOSE SERPL-MCNC: 266 MG/DL — HIGH (ref 70–99)
HCO3 BLDV-SCNC: 17 MMOL/L — LOW (ref 22–29)
HCT VFR BLD CALC: 29.7 % — LOW (ref 39–50)
HCT VFR BLDA CALC: 32 % — LOW (ref 39–51)
HGB BLD CALC-MCNC: 10.5 G/DL — LOW (ref 12.6–17.4)
HGB BLD-MCNC: 9.8 G/DL — LOW (ref 13–17)
LACTATE BLDV-MCNC: 3.8 MMOL/L — HIGH (ref 0.5–2)
MAGNESIUM SERPL-MCNC: 1.4 MG/DL — LOW (ref 1.6–2.6)
MCHC RBC-ENTMCNC: 31.5 PG — SIGNIFICANT CHANGE UP (ref 27–34)
MCHC RBC-ENTMCNC: 33 GM/DL — SIGNIFICANT CHANGE UP (ref 32–36)
MCV RBC AUTO: 95.5 FL — SIGNIFICANT CHANGE UP (ref 80–100)
PCO2 BLDV: 34 MMHG — LOW (ref 42–55)
PH BLDV: 7.31 — LOW (ref 7.32–7.43)
PLATELET # BLD AUTO: 160 K/UL — SIGNIFICANT CHANGE UP (ref 150–400)
PO2 BLDV: 25 MMHG — SIGNIFICANT CHANGE UP (ref 25–45)
POTASSIUM BLDV-SCNC: 4.8 MMOL/L — SIGNIFICANT CHANGE UP (ref 3.5–5.1)
POTASSIUM SERPL-MCNC: 4.9 MMOL/L — SIGNIFICANT CHANGE UP (ref 3.5–5.3)
POTASSIUM SERPL-SCNC: 4.9 MMOL/L — SIGNIFICANT CHANGE UP (ref 3.5–5.3)
PROT SERPL-MCNC: 6.9 G/DL — SIGNIFICANT CHANGE UP (ref 6–8.3)
RBC # BLD: 3.11 M/UL — LOW (ref 4.2–5.8)
RBC # FLD: 14.2 % — SIGNIFICANT CHANGE UP (ref 10.3–14.5)
SAO2 % BLDV: 31.9 % — LOW (ref 67–88)
SODIUM SERPL-SCNC: 130 MMOL/L — LOW (ref 135–145)
TROPONIN T, HIGH SENSITIVITY RESULT: 41 NG/L — SIGNIFICANT CHANGE UP (ref 0–51)
WBC # BLD: 4.72 K/UL — SIGNIFICANT CHANGE UP (ref 3.8–10.5)
WBC # FLD AUTO: 4.72 K/UL — SIGNIFICANT CHANGE UP (ref 3.8–10.5)

## 2022-12-29 PROCEDURE — 71045 X-RAY EXAM CHEST 1 VIEW: CPT | Mod: 26

## 2022-12-29 PROCEDURE — 70450 CT HEAD/BRAIN W/O DYE: CPT | Mod: 26,MA

## 2022-12-29 PROCEDURE — 99053 MED SERV 10PM-8AM 24 HR FAC: CPT

## 2022-12-29 PROCEDURE — 72125 CT NECK SPINE W/O DYE: CPT | Mod: 26,MA

## 2022-12-29 PROCEDURE — 99291 CRITICAL CARE FIRST HOUR: CPT

## 2022-12-29 RX ORDER — SODIUM CHLORIDE 9 MG/ML
1000 INJECTION INTRAMUSCULAR; INTRAVENOUS; SUBCUTANEOUS ONCE
Refills: 0 | Status: COMPLETED | OUTPATIENT
Start: 2022-12-29 | End: 2022-12-29

## 2022-12-29 RX ORDER — SODIUM CHLORIDE 9 MG/ML
1000 INJECTION, SOLUTION INTRAVENOUS ONCE
Refills: 0 | Status: COMPLETED | OUTPATIENT
Start: 2022-12-29 | End: 2022-12-29

## 2022-12-29 RX ADMIN — SODIUM CHLORIDE 2000 MILLILITER(S): 9 INJECTION INTRAMUSCULAR; INTRAVENOUS; SUBCUTANEOUS at 23:10

## 2022-12-29 NOTE — ED PROVIDER NOTE - CLINICAL SUMMARY MEDICAL DECISION MAKING FREE TEXT BOX
ap- 77 M w/ hx of HTN, DM2, HLD, GERD, seeds implant for prostate cancer in 2008, radiation treatment (25 Gy in 5 Fx) rectal cancer in 6/2022 here w/ generalized weakness and frequent falls, pt w/ no cp, no sob, no headache no vision changes, no nausea no vomiting. On exam, pt is awake and alert oriented x3, he has clear lungs soft abdomen, no lower leg edema, pt w/ no c/t/l spine tenderness, he has 5/5 upper and lower extremity strength, pt w/ intact sensation in the bilateral arms and legs. Pt w/ findings c/f possible near syncope, per granddaughter at bedside pt w/ SBP 60/40s prior to coming in. Pt is on multiple antihypertensives and over the past 3 months, w weight loss from 170 to 130s and blood pressure has been down trending ap- 77 M w/ hx of HTN, DM2, HLD, GERD, seeds implant for prostate cancer in 2008, radiation treatment (25 Gy in 5 Fx) rectal cancer in 6/2022 here w/ generalized weakness and frequent falls, pt w/ no cp, no sob, no headache no vision changes, no nausea no vomiting. On exam, pt is awake and alert oriented x3, he has clear lungs soft abdomen, no lower leg edema, pt w/ no c/t/l spine tenderness, he has 5/5 upper and lower extremity strength, pt w/ intact sensation in the bilateral arms and legs. Pt w/ findings c/f possible near syncope, per granddaughter at bedside pt w/ SBP 60/40s prior to coming in. Pt is on multiple antihypertensives and over the past 3 months, w weight loss from 170 to 130s and blood pressure has been down trending. falls happening at home and have been unwitnessed possible medication related

## 2022-12-29 NOTE — ED PROVIDER NOTE - OBJECTIVE STATEMENT
77-year-old male past medical history of active colon CA on chemo, last chemo was 1 week ago, diabetes, hypertension, hyperlipidemia presents to the ED generalized weakness, frequent falls and low blood pressure for the past week with decreased p.o. intake.  Denies fever, nausea, vomiting, abdominal pain, chest pain, shortness of breath, cough, dysuria, diarrhea.

## 2022-12-29 NOTE — ED PROVIDER NOTE - RAPID ASSESSMENT
76 y/o M PMHx DM, HLD, colon cancer undergoing chemo, having trouble eating and having balance, presents to the ED c/o weakness and trouble keeping head up of which started earlier this week. Pt fell multiple times and cannot walk independently as of recent. Pt denies F/C, head strike, LOC.    IBladimir (Scribe) have documented this rapid assessment note under the dictation of Loren MANJARREZ) which has been reviewed and affirmed to be accurate. Patient was seen as a QDOC patient. The patient will be seen and further worked up in the main emergency department and their care will be completed by the main emergency department team along with a thorough physical exam. Receiving team will follow up on labs, analgesia, any clinical imaging, reassess and disposition as clinically indicated, all decisions regarding the progression of care will be made at their discretion. 76 y/o M PMHx DM, HLD, colon cancer undergoing chemo, having trouble eating and having balance, presents to the ED c/o weakness and trouble keeping head up of which started earlier this week. Pt fell multiple times and cannot walk independently as of recent. Pt denies F/C, head strike, LOC.    IBladimir (Scribe) have documented this rapid assessment note under the dictation of Loren MANJARREZ) which has been reviewed and affirmed to be accurate. Patient was seen as a QDOC patient. The patient will be seen and further worked up in the main emergency department and their care will be completed by the main emergency department team along with a thorough physical exam. Receiving team will follow up on labs, analgesia, any clinical imaging, reassess and disposition as clinically indicated, all decisions regarding the progression of care will be made at their discretion.    Jazmyn Winters MD - Attending Physician: The scribe's documentation has been prepared under my direction and personally reviewed by me in its entirety. I confirm that the note above accurately reflects all work, treatment, procedures, and medical decision making performed by me.

## 2022-12-29 NOTE — ED ADULT NURSE NOTE - OBJECTIVE STATEMENT
77y Male PMhx colon CA on chemo, last chemo 1w ago, diabetes, HTN, and HLD presenting to ED via walk-in triage for generalized weakness, falls, and low BP at home. As per pt daughter at bedside pt has had 3 falls in the past couple of weeks, pt denies hitting head or LOC. Pt was hypotensive at home 80s/50s and has had dec PO intake. Pt denies fever, N/V/D, chest pain, shortness of breath, cough, dysuria, diarrhea. IV placed, labs drawn and sent, fluids hung as ordered, seen and eval by MD, stretcher in lowest position and locked, call bell in reach.

## 2022-12-29 NOTE — ED PROVIDER NOTE - NS ED ROS FT
GENERAL: No fever, chills  EYES: no vision changes, no discharge.   ENT: no difficulty swallowing or speaking   CARDIAC: no chest pain/pressure, SOB, lower extremity swelling  PULMONARY: no cough, SOB  GI: no abdominal pain, n/v/d  : no dysuria, no hematuria  SKIN: no rashes, no ecchymosis  NEURO: no headache, +lightheadedness  MSK: No joint pain, myalgia, +weakness.

## 2022-12-29 NOTE — ED PROVIDER NOTE - PROGRESS NOTE DETAILS
Berlin Boyce, DO PGY-2: Received call from lab, lactate increasing after 2 L IV fluids, troponin is stable, other labs largely unremarkable.  Patient's vital signs of improved after 2 L of fluid but given patient's underlying colon cancer will obtain CT abdomen/pelvis to rule out occult infectious source.  CT head neck negative as well as chest x-ray Berlin Boyce, DO PGY-2: Repeat lactate stable patient does not have abdominal pain.  CT canceled.

## 2022-12-29 NOTE — ED PROVIDER NOTE - PHYSICAL EXAMINATION
GEN: Patient awake alert NAD.   HEENT: normocephalic, atraumatic, EOMI, no scleral icterus, moist MM  CARDIAC: tachycardic, S1, S2, no murmur.   PULM: CTA B/L no wheeze, rhonchi, rales.   ABD: soft NT, ND, no rebound no guarding, no CVA tenderness.   MSK: Moving all extremities, no edema.   NEURO: A&Ox3, no focal neurological deficits  SKIN: warm, dry, no rash.

## 2022-12-29 NOTE — ED ADULT TRIAGE NOTE - CHIEF COMPLAINT QUOTE
3 falls in last 3 days, headaches, difficulty regulating BP, lightheadedness  hx colon ca on chemo (last tx 12/26)

## 2022-12-30 DIAGNOSIS — R62.7 ADULT FAILURE TO THRIVE: ICD-10-CM

## 2022-12-30 DIAGNOSIS — R53.1 WEAKNESS: ICD-10-CM

## 2022-12-30 DIAGNOSIS — C18.9 MALIGNANT NEOPLASM OF COLON, UNSPECIFIED: ICD-10-CM

## 2022-12-30 DIAGNOSIS — C20 MALIGNANT NEOPLASM OF RECTUM: ICD-10-CM

## 2022-12-30 DIAGNOSIS — Z29.9 ENCOUNTER FOR PROPHYLACTIC MEASURES, UNSPECIFIED: ICD-10-CM

## 2022-12-30 DIAGNOSIS — E11.9 TYPE 2 DIABETES MELLITUS WITHOUT COMPLICATIONS: ICD-10-CM

## 2022-12-30 LAB
ALBUMIN SERPL ELPH-MCNC: 3.9 G/DL — SIGNIFICANT CHANGE UP (ref 3.3–5)
ALP SERPL-CCNC: 59 U/L — SIGNIFICANT CHANGE UP (ref 40–120)
ALT FLD-CCNC: 37 U/L — SIGNIFICANT CHANGE UP (ref 10–45)
ANION GAP SERPL CALC-SCNC: 12 MMOL/L — SIGNIFICANT CHANGE UP (ref 5–17)
ANION GAP SERPL CALC-SCNC: 15 MMOL/L — SIGNIFICANT CHANGE UP (ref 5–17)
APPEARANCE UR: CLEAR — SIGNIFICANT CHANGE UP
APTT BLD: 26.9 SEC — LOW (ref 27.5–35.5)
AST SERPL-CCNC: 28 U/L — SIGNIFICANT CHANGE UP (ref 10–40)
BACTERIA # UR AUTO: NEGATIVE — SIGNIFICANT CHANGE UP
BASE EXCESS BLDV CALC-SCNC: -7 MMOL/L — LOW (ref -2–3)
BASE EXCESS BLDV CALC-SCNC: -8.8 MMOL/L — LOW (ref -2–3)
BASOPHILS # BLD AUTO: 0 K/UL — SIGNIFICANT CHANGE UP (ref 0–0.2)
BASOPHILS NFR BLD AUTO: 0 % — SIGNIFICANT CHANGE UP (ref 0–2)
BILIRUB SERPL-MCNC: 0.3 MG/DL — SIGNIFICANT CHANGE UP (ref 0.2–1.2)
BILIRUB UR-MCNC: NEGATIVE — SIGNIFICANT CHANGE UP
BUN SERPL-MCNC: 29 MG/DL — HIGH (ref 7–23)
BUN SERPL-MCNC: 33 MG/DL — HIGH (ref 7–23)
CA-I SERPL-SCNC: 1.38 MMOL/L — HIGH (ref 1.15–1.33)
CA-I SERPL-SCNC: 1.4 MMOL/L — HIGH (ref 1.15–1.33)
CALCIUM SERPL-MCNC: 10 MG/DL — SIGNIFICANT CHANGE UP (ref 8.4–10.5)
CALCIUM SERPL-MCNC: 9.2 MG/DL — SIGNIFICANT CHANGE UP (ref 8.4–10.5)
CHLORIDE BLDV-SCNC: 101 MMOL/L — SIGNIFICANT CHANGE UP (ref 96–108)
CHLORIDE BLDV-SCNC: 102 MMOL/L — SIGNIFICANT CHANGE UP (ref 96–108)
CHLORIDE SERPL-SCNC: 101 MMOL/L — SIGNIFICANT CHANGE UP (ref 96–108)
CHLORIDE SERPL-SCNC: 101 MMOL/L — SIGNIFICANT CHANGE UP (ref 96–108)
CO2 BLDV-SCNC: 18 MMOL/L — LOW (ref 22–26)
CO2 BLDV-SCNC: 18 MMOL/L — LOW (ref 22–26)
CO2 SERPL-SCNC: 16 MMOL/L — LOW (ref 22–31)
CO2 SERPL-SCNC: 18 MMOL/L — LOW (ref 22–31)
COLOR SPEC: SIGNIFICANT CHANGE UP
CREAT SERPL-MCNC: 1.11 MG/DL — SIGNIFICANT CHANGE UP (ref 0.5–1.3)
CREAT SERPL-MCNC: 1.15 MG/DL — SIGNIFICANT CHANGE UP (ref 0.5–1.3)
CULTURE RESULTS: SIGNIFICANT CHANGE UP
DIFF PNL FLD: NEGATIVE — SIGNIFICANT CHANGE UP
EGFR: 66 ML/MIN/1.73M2 — SIGNIFICANT CHANGE UP
EGFR: 68 ML/MIN/1.73M2 — SIGNIFICANT CHANGE UP
EOSINOPHIL # BLD AUTO: 0 K/UL — SIGNIFICANT CHANGE UP (ref 0–0.5)
EOSINOPHIL NFR BLD AUTO: 0 % — SIGNIFICANT CHANGE UP (ref 0–6)
EPI CELLS # UR: 0 /HPF — SIGNIFICANT CHANGE UP
GAS PNL BLDV: 130 MMOL/L — LOW (ref 136–145)
GAS PNL BLDV: 131 MMOL/L — LOW (ref 136–145)
GAS PNL BLDV: SIGNIFICANT CHANGE UP
GLUCOSE BLDC GLUCOMTR-MCNC: 205 MG/DL — HIGH (ref 70–99)
GLUCOSE BLDC GLUCOMTR-MCNC: 271 MG/DL — HIGH (ref 70–99)
GLUCOSE BLDC GLUCOMTR-MCNC: 282 MG/DL — HIGH (ref 70–99)
GLUCOSE BLDV-MCNC: 228 MG/DL — HIGH (ref 70–99)
GLUCOSE BLDV-MCNC: 234 MG/DL — HIGH (ref 70–99)
GLUCOSE SERPL-MCNC: 203 MG/DL — HIGH (ref 70–99)
GLUCOSE SERPL-MCNC: 262 MG/DL — HIGH (ref 70–99)
GLUCOSE UR QL: ABNORMAL
HCO3 BLDV-SCNC: 17 MMOL/L — LOW (ref 22–29)
HCO3 BLDV-SCNC: 17 MMOL/L — LOW (ref 22–29)
HCT VFR BLD CALC: 25.6 % — LOW (ref 39–50)
HCT VFR BLDA CALC: 25 % — LOW (ref 39–51)
HCT VFR BLDA CALC: 26 % — LOW (ref 39–51)
HGB BLD CALC-MCNC: 8.3 G/DL — LOW (ref 12.6–17.4)
HGB BLD CALC-MCNC: 8.5 G/DL — LOW (ref 12.6–17.4)
HGB BLD-MCNC: 8.8 G/DL — LOW (ref 13–17)
HYALINE CASTS # UR AUTO: 1 /LPF — SIGNIFICANT CHANGE UP (ref 0–7)
INR BLD: 1.02 RATIO — SIGNIFICANT CHANGE UP (ref 0.88–1.16)
KETONES UR-MCNC: NEGATIVE — SIGNIFICANT CHANGE UP
LACTATE BLDV-MCNC: 3.7 MMOL/L — HIGH (ref 0.5–2)
LACTATE BLDV-MCNC: 5.5 MMOL/L — CRITICAL HIGH (ref 0.5–2)
LACTATE SERPL-SCNC: 1.5 MMOL/L — SIGNIFICANT CHANGE UP (ref 0.5–2)
LACTATE SERPL-SCNC: 2.2 MMOL/L — HIGH (ref 0.5–2)
LEUKOCYTE ESTERASE UR-ACNC: NEGATIVE — SIGNIFICANT CHANGE UP
LYMPHOCYTES # BLD AUTO: 1.27 K/UL — SIGNIFICANT CHANGE UP (ref 1–3.3)
LYMPHOCYTES # BLD AUTO: 27 % — SIGNIFICANT CHANGE UP (ref 13–44)
MAGNESIUM SERPL-MCNC: 1.9 MG/DL — SIGNIFICANT CHANGE UP (ref 1.6–2.6)
MANUAL SMEAR VERIFICATION: SIGNIFICANT CHANGE UP
MCHC RBC-ENTMCNC: 32.1 PG — SIGNIFICANT CHANGE UP (ref 27–34)
MCHC RBC-ENTMCNC: 34.4 GM/DL — SIGNIFICANT CHANGE UP (ref 32–36)
MCV RBC AUTO: 93.4 FL — SIGNIFICANT CHANGE UP (ref 80–100)
MONOCYTES # BLD AUTO: 0.61 K/UL — SIGNIFICANT CHANGE UP (ref 0–0.9)
MONOCYTES NFR BLD AUTO: 13 % — SIGNIFICANT CHANGE UP (ref 2–14)
NEUTROPHILS # BLD AUTO: 2.83 K/UL — SIGNIFICANT CHANGE UP (ref 1.8–7.4)
NEUTROPHILS NFR BLD AUTO: 60 % — SIGNIFICANT CHANGE UP (ref 43–77)
NITRITE UR-MCNC: NEGATIVE — SIGNIFICANT CHANGE UP
NRBC # BLD: 0 /100 WBCS — SIGNIFICANT CHANGE UP (ref 0–0)
NRBC # BLD: 1 /100 — HIGH (ref 0–0)
PCO2 BLDV: 29 MMHG — LOW (ref 42–55)
PCO2 BLDV: 33 MMHG — LOW (ref 42–55)
PH BLDV: 7.31 — LOW (ref 7.32–7.43)
PH BLDV: 7.38 — SIGNIFICANT CHANGE UP (ref 7.32–7.43)
PH UR: 6 — SIGNIFICANT CHANGE UP (ref 5–8)
PHOSPHATE SERPL-MCNC: 3.3 MG/DL — SIGNIFICANT CHANGE UP (ref 2.5–4.5)
PLAT MORPH BLD: NORMAL — SIGNIFICANT CHANGE UP
PLATELET # BLD AUTO: 134 K/UL — LOW (ref 150–400)
PO2 BLDV: 32 MMHG — SIGNIFICANT CHANGE UP (ref 25–45)
PO2 BLDV: 77 MMHG — HIGH (ref 25–45)
POIKILOCYTOSIS BLD QL AUTO: SLIGHT — SIGNIFICANT CHANGE UP
POTASSIUM BLDV-SCNC: 4.8 MMOL/L — SIGNIFICANT CHANGE UP (ref 3.5–5.1)
POTASSIUM BLDV-SCNC: 5 MMOL/L — SIGNIFICANT CHANGE UP (ref 3.5–5.1)
POTASSIUM SERPL-MCNC: 4.2 MMOL/L — SIGNIFICANT CHANGE UP (ref 3.5–5.3)
POTASSIUM SERPL-MCNC: 4.6 MMOL/L — SIGNIFICANT CHANGE UP (ref 3.5–5.3)
POTASSIUM SERPL-SCNC: 4.2 MMOL/L — SIGNIFICANT CHANGE UP (ref 3.5–5.3)
POTASSIUM SERPL-SCNC: 4.6 MMOL/L — SIGNIFICANT CHANGE UP (ref 3.5–5.3)
PROT SERPL-MCNC: 6.3 G/DL — SIGNIFICANT CHANGE UP (ref 6–8.3)
PROT UR-MCNC: SIGNIFICANT CHANGE UP
PROTHROM AB SERPL-ACNC: 11.8 SEC — SIGNIFICANT CHANGE UP (ref 10.5–13.4)
RAPID RVP RESULT: SIGNIFICANT CHANGE UP
RBC # BLD: 2.74 M/UL — LOW (ref 4.2–5.8)
RBC # FLD: 14.4 % — SIGNIFICANT CHANGE UP (ref 10.3–14.5)
RBC BLD AUTO: SIGNIFICANT CHANGE UP
RBC CASTS # UR COMP ASSIST: 2 /HPF — SIGNIFICANT CHANGE UP (ref 0–4)
SAO2 % BLDV: 48.5 % — LOW (ref 67–88)
SAO2 % BLDV: 97.8 % — HIGH (ref 67–88)
SARS-COV-2 RNA SPEC QL NAA+PROBE: SIGNIFICANT CHANGE UP
SODIUM SERPL-SCNC: 131 MMOL/L — LOW (ref 135–145)
SODIUM SERPL-SCNC: 132 MMOL/L — LOW (ref 135–145)
SP GR SPEC: 1.01 — SIGNIFICANT CHANGE UP (ref 1.01–1.02)
SPECIMEN SOURCE: SIGNIFICANT CHANGE UP
TROPONIN T, HIGH SENSITIVITY RESULT: 33 NG/L — SIGNIFICANT CHANGE UP (ref 0–51)
UROBILINOGEN FLD QL: NEGATIVE — SIGNIFICANT CHANGE UP
WBC # BLD: 3.32 K/UL — LOW (ref 3.8–10.5)
WBC # FLD AUTO: 3.32 K/UL — LOW (ref 3.8–10.5)
WBC UR QL: 1 /HPF — SIGNIFICANT CHANGE UP (ref 0–5)

## 2022-12-30 PROCEDURE — 99223 1ST HOSP IP/OBS HIGH 75: CPT

## 2022-12-30 PROCEDURE — 72170 X-RAY EXAM OF PELVIS: CPT | Mod: 26

## 2022-12-30 RX ORDER — INSULIN LISPRO 100/ML
VIAL (ML) SUBCUTANEOUS AT BEDTIME
Refills: 0 | Status: DISCONTINUED | OUTPATIENT
Start: 2022-12-30 | End: 2023-01-01

## 2022-12-30 RX ORDER — MEGESTROL ACETATE 40 MG/ML
800 SUSPENSION ORAL DAILY
Refills: 0 | Status: DISCONTINUED | OUTPATIENT
Start: 2022-12-30 | End: 2023-01-01

## 2022-12-30 RX ORDER — FOLIC ACID 0.8 MG
1 TABLET ORAL DAILY
Refills: 0 | Status: DISCONTINUED | OUTPATIENT
Start: 2022-12-30 | End: 2023-01-01

## 2022-12-30 RX ORDER — MAGNESIUM SULFATE 500 MG/ML
2 VIAL (ML) INJECTION ONCE
Refills: 0 | Status: COMPLETED | OUTPATIENT
Start: 2022-12-30 | End: 2022-12-30

## 2022-12-30 RX ORDER — ACETAMINOPHEN 500 MG
650 TABLET ORAL EVERY 6 HOURS
Refills: 0 | Status: DISCONTINUED | OUTPATIENT
Start: 2022-12-30 | End: 2023-01-01

## 2022-12-30 RX ORDER — SODIUM CHLORIDE 9 MG/ML
1000 INJECTION, SOLUTION INTRAVENOUS
Refills: 0 | Status: DISCONTINUED | OUTPATIENT
Start: 2022-12-30 | End: 2022-12-30

## 2022-12-30 RX ORDER — GLUCAGON INJECTION, SOLUTION 0.5 MG/.1ML
1 INJECTION, SOLUTION SUBCUTANEOUS ONCE
Refills: 0 | Status: DISCONTINUED | OUTPATIENT
Start: 2022-12-30 | End: 2023-01-01

## 2022-12-30 RX ORDER — PANTOPRAZOLE SODIUM 20 MG/1
40 TABLET, DELAYED RELEASE ORAL
Refills: 0 | Status: DISCONTINUED | OUTPATIENT
Start: 2022-12-30 | End: 2023-01-01

## 2022-12-30 RX ORDER — INSULIN GLARGINE 100 [IU]/ML
5 INJECTION, SOLUTION SUBCUTANEOUS ONCE
Refills: 0 | Status: COMPLETED | OUTPATIENT
Start: 2022-12-30 | End: 2022-12-30

## 2022-12-30 RX ORDER — SODIUM CHLORIDE 9 MG/ML
1000 INJECTION, SOLUTION INTRAVENOUS
Refills: 0 | Status: DISCONTINUED | OUTPATIENT
Start: 2022-12-30 | End: 2023-01-01

## 2022-12-30 RX ORDER — DEXTROSE 50 % IN WATER 50 %
15 SYRINGE (ML) INTRAVENOUS ONCE
Refills: 0 | Status: DISCONTINUED | OUTPATIENT
Start: 2022-12-30 | End: 2023-01-01

## 2022-12-30 RX ORDER — DEXTROSE 50 % IN WATER 50 %
25 SYRINGE (ML) INTRAVENOUS ONCE
Refills: 0 | Status: DISCONTINUED | OUTPATIENT
Start: 2022-12-30 | End: 2023-01-01

## 2022-12-30 RX ORDER — SODIUM CHLORIDE 9 MG/ML
1000 INJECTION INTRAMUSCULAR; INTRAVENOUS; SUBCUTANEOUS
Refills: 0 | Status: DISCONTINUED | OUTPATIENT
Start: 2022-12-30 | End: 2022-12-31

## 2022-12-30 RX ORDER — ENOXAPARIN SODIUM 100 MG/ML
40 INJECTION SUBCUTANEOUS EVERY 24 HOURS
Refills: 0 | Status: DISCONTINUED | OUTPATIENT
Start: 2022-12-30 | End: 2023-01-01

## 2022-12-30 RX ORDER — SODIUM CHLORIDE 9 MG/ML
1000 INJECTION, SOLUTION INTRAVENOUS ONCE
Refills: 0 | Status: COMPLETED | OUTPATIENT
Start: 2022-12-30 | End: 2022-12-30

## 2022-12-30 RX ORDER — DEXTROSE 50 % IN WATER 50 %
12.5 SYRINGE (ML) INTRAVENOUS ONCE
Refills: 0 | Status: DISCONTINUED | OUTPATIENT
Start: 2022-12-30 | End: 2023-01-01

## 2022-12-30 RX ORDER — FLUOCINONIDE/EMOLLIENT BASE 0.05 %
0 CREAM (GRAM) TOPICAL
Qty: 0 | Refills: 0 | DISCHARGE

## 2022-12-30 RX ORDER — INSULIN LISPRO 100/ML
VIAL (ML) SUBCUTANEOUS
Refills: 0 | Status: DISCONTINUED | OUTPATIENT
Start: 2022-12-30 | End: 2023-01-01

## 2022-12-30 RX ORDER — ATORVASTATIN CALCIUM 80 MG/1
20 TABLET, FILM COATED ORAL AT BEDTIME
Refills: 0 | Status: DISCONTINUED | OUTPATIENT
Start: 2022-12-30 | End: 2023-01-01

## 2022-12-30 RX ADMIN — SODIUM CHLORIDE 75 MILLILITER(S): 9 INJECTION INTRAMUSCULAR; INTRAVENOUS; SUBCUTANEOUS at 13:07

## 2022-12-30 RX ADMIN — SODIUM CHLORIDE 100 MILLILITER(S): 9 INJECTION, SOLUTION INTRAVENOUS at 04:38

## 2022-12-30 RX ADMIN — Medication 25 GRAM(S): at 04:38

## 2022-12-30 RX ADMIN — SODIUM CHLORIDE 100 MILLILITER(S): 9 INJECTION, SOLUTION INTRAVENOUS at 06:51

## 2022-12-30 RX ADMIN — MEGESTROL ACETATE 800 MILLIGRAM(S): 40 SUSPENSION ORAL at 14:54

## 2022-12-30 RX ADMIN — ATORVASTATIN CALCIUM 20 MILLIGRAM(S): 80 TABLET, FILM COATED ORAL at 21:56

## 2022-12-30 RX ADMIN — Medication 1 TABLET(S): at 14:54

## 2022-12-30 RX ADMIN — Medication 3: at 17:28

## 2022-12-30 RX ADMIN — Medication 1 MILLIGRAM(S): at 12:42

## 2022-12-30 RX ADMIN — Medication 1: at 21:56

## 2022-12-30 RX ADMIN — ENOXAPARIN SODIUM 40 MILLIGRAM(S): 100 INJECTION SUBCUTANEOUS at 12:42

## 2022-12-30 RX ADMIN — SODIUM CHLORIDE 1000 MILLILITER(S): 9 INJECTION, SOLUTION INTRAVENOUS at 00:11

## 2022-12-30 RX ADMIN — Medication 2: at 12:42

## 2022-12-30 NOTE — DIETITIAN INITIAL EVALUATION ADULT - ADD RECOMMEND
1) New malnutrition notification sent.   2) Recommend discontinuing current diet, recommend consistent carbohydrate diet.   3) Recommend Glucerna 3x/day (660 chalino, 30 Gm protein) to optimize PO intake.   4) Encourage adequate intake of meals and supplements to optimize PO intake.   5) Pending no contraindications, recommend multivitamin.   6) Monitor PO intake/tolerance, weights, labs, hydration status, bowels, and skin integrity.   7) Recommend obtaining HbA1c.

## 2022-12-30 NOTE — PATIENT PROFILE ADULT - FALL HARM RISK - HARM RISK INTERVENTIONS

## 2022-12-30 NOTE — PROGRESS NOTE ADULT - SUBJECTIVE AND OBJECTIVE BOX
DATE OF SERVICE: 22 @ 11:15    Patient is a 77y old  Male who presents with a chief complaint of failure to thrive (30 Dec 2022 07:02)      SUBJECTIVE / OVERNIGHT EVENTS:  No acute events overnight, patient reports doing well and all questions answered at this time.     Additional Review of Systems:  Denies any other acute sx including f/c, HA, cough, sore throat, eye/ear changes, rhinorrhea, CP, palpitations, SOB, n/v, abdominal pain, back pain, bowel/bladder changes, fatigue, numbness or tingling.    MEDICATIONS  (STANDING):  atorvastatin 20 milliGRAM(s) Oral at bedtime  dextrose 5%. 1000 milliLiter(s) (100 mL/Hr) IV Continuous <Continuous>  dextrose 5%. 1000 milliLiter(s) (50 mL/Hr) IV Continuous <Continuous>  dextrose 50% Injectable 25 Gram(s) IV Push once  dextrose 50% Injectable 12.5 Gram(s) IV Push once  dextrose 50% Injectable 25 Gram(s) IV Push once  enoxaparin Injectable 40 milliGRAM(s) SubCutaneous every 24 hours  folic acid 1 milliGRAM(s) Oral daily  glucagon  Injectable 1 milliGRAM(s) IntraMuscular once  insulin lispro (ADMELOG) corrective regimen sliding scale   SubCutaneous three times a day before meals  insulin lispro (ADMELOG) corrective regimen sliding scale   SubCutaneous at bedtime  megestrol Suspension 800 milliGRAM(s) Oral daily  pantoprazole    Tablet 40 milliGRAM(s) Oral before breakfast    MEDICATIONS  (PRN):  acetaminophen     Tablet .. 650 milliGRAM(s) Oral every 6 hours PRN Temp greater or equal to 38C (100.4F), Mild Pain (1 - 3)  dextrose Oral Gel 15 Gram(s) Oral once PRN Blood Glucose LESS THAN 70 milliGRAM(s)/deciliter      Vital Signs Last 24 Hrs  T(C): 37.4 (30 Dec 2022 08:36), Max: 37.4 (30 Dec 2022 08:36)  T(F): 99.3 (30 Dec 2022 08:36), Max: 99.3 (30 Dec 2022 08:36)  HR: 87 (30 Dec 2022 08:36) (87 - 116)  BP: 109/68 (30 Dec 2022 08:36) (84/42 - 119/65)  BP(mean): 69 (30 Dec 2022 06:10) (68 - 80)  RR: 16 (30 Dec 2022 08:36) (16 - 20)  SpO2: 100% (30 Dec 2022 10:20) (98% - 100%)    Parameters below as of 30 Dec 2022 10:20  Patient On (Oxygen Delivery Method): room air      CAPILLARY BLOOD GLUCOSE      POCT Blood Glucose.: 229 mg/dL (29 Dec 2022 22:12)    I&O's Summary      PHYSICAL EXAM:  GENERAL: Clinically well-appearing and comfortable  HEAD:  Atraumatic, Normocephalic  EYES: EOMI, PERRLA, conjunctiva and sclera clear  NECK: Supple, No JVD  CHEST/LUNG: Clear to auscultation bilaterally; No wheeze  HEART: Regular rate and rhythm; No murmurs, rubs, or gallops  ABDOMEN: Soft, Nontender, Nondistended; Bowel sounds present  EXTREMITIES:  2+ Peripheral Pulses, No clubbing, cyanosis, or edema  PSYCH: Normal mood, Normal affect  NEUROLOGY: non-focal  SKIN: No rashes or lesions    LABS:                        8.8    3.32  )-----------( 134      ( 30 Dec 2022 09:55 )             25.6     12-30    131<L>  |  101  |  33<H>  ----------------------------<  203<H>  4.6   |  18<L>  |  1.11    Ca    10.0      30 Dec 2022 09:55  Phos  3.3     12-30  Mg     1.9     12-30    TPro  6.3  /  Alb  3.9  /  TBili  0.3  /  DBili  x   /  AST  28  /  ALT  37  /  AlkPhos  59  12-30    PT/INR - ( 29 Dec 2022 23:09 )   PT: 11.8 sec;   INR: 1.02 ratio         PTT - ( 29 Dec 2022 23:09 )  PTT:26.9 sec      Urinalysis Basic - ( 30 Dec 2022 00:54 )    Color: Light Yellow / Appearance: Clear / S.012 / pH: x  Gluc: x / Ketone: Negative  / Bili: Negative / Urobili: Negative   Blood: x / Protein: Trace / Nitrite: Negative   Leuk Esterase: Negative / RBC: 2 /hpf / WBC 1 /HPF   Sq Epi: x / Non Sq Epi: 0 /hpf / Bacteria: Negative        RADIOLOGY & ADDITIONAL TESTS:    Imaging Personally Reviewed:    Consultant(s) Notes Reviewed:      Care Discussed with Consultants/Other Providers:   DATE OF SERVICE: 22 @ 11:15    Patient is a 77y old  Male who presents with a chief complaint of failure to thrive (30 Dec 2022 07:02)      SUBJECTIVE / OVERNIGHT EVENTS:  No acute interval events, patient seen while eating lunch. Denied acute complaints, though refused exam.    Additional Review of Systems:  +poor PO intake, +weight loss, generalized weakness to the point of not ambulating    MEDICATIONS  (STANDING):  atorvastatin 20 milliGRAM(s) Oral at bedtime  dextrose 5%. 1000 milliLiter(s) (100 mL/Hr) IV Continuous <Continuous>  dextrose 5%. 1000 milliLiter(s) (50 mL/Hr) IV Continuous <Continuous>  dextrose 50% Injectable 25 Gram(s) IV Push once  dextrose 50% Injectable 12.5 Gram(s) IV Push once  dextrose 50% Injectable 25 Gram(s) IV Push once  enoxaparin Injectable 40 milliGRAM(s) SubCutaneous every 24 hours  folic acid 1 milliGRAM(s) Oral daily  glucagon  Injectable 1 milliGRAM(s) IntraMuscular once  insulin lispro (ADMELOG) corrective regimen sliding scale   SubCutaneous three times a day before meals  insulin lispro (ADMELOG) corrective regimen sliding scale   SubCutaneous at bedtime  megestrol Suspension 800 milliGRAM(s) Oral daily  pantoprazole    Tablet 40 milliGRAM(s) Oral before breakfast    MEDICATIONS  (PRN):  acetaminophen     Tablet .. 650 milliGRAM(s) Oral every 6 hours PRN Temp greater or equal to 38C (100.4F), Mild Pain (1 - 3)  dextrose Oral Gel 15 Gram(s) Oral once PRN Blood Glucose LESS THAN 70 milliGRAM(s)/deciliter      Vital Signs Last 24 Hrs  T(C): 37.4 (30 Dec 2022 08:36), Max: 37.4 (30 Dec 2022 08:36)  T(F): 99.3 (30 Dec 2022 08:36), Max: 99.3 (30 Dec 2022 08:36)  HR: 87 (30 Dec 2022 08:36) (87 - 116)  BP: 109/68 (30 Dec 2022 08:36) (84/42 - 119/65)  BP(mean): 69 (30 Dec 2022 06:10) (68 - 80)  RR: 16 (30 Dec 2022 08:36) (16 - 20)  SpO2: 100% (30 Dec 2022 10:20) (98% - 100%)    Parameters below as of 30 Dec 2022 10:20  Patient On (Oxygen Delivery Method): room air      CAPILLARY BLOOD GLUCOSE      POCT Blood Glucose.: 229 mg/dL (29 Dec 2022 22:12)    I&O's Summary      PHYSICAL EXAM:  GENERAL: Clinically well-appearing and comfortable, eating  HEAD:  Atraumatic, Normocephalic  EYES: EOMI, PERRLA, conjunctiva and sclera clear  NECK: Supple, No JVD  EXTREMITIES: No edema  PSYCH: Irritable mood    LABS:                        8.8    3.32  )-----------( 134      ( 30 Dec 2022 09:55 )             25.6     12-30    131<L>  |  101  |  33<H>  ----------------------------<  203<H>  4.6   |  18<L>  |  1.11    Ca    10.0      30 Dec 2022 09:55  Phos  3.3     12-30  Mg     1.9     12-30    TPro  6.3  /  Alb  3.9  /  TBili  0.3  /  DBili  x   /  AST  28  /  ALT  37  /  AlkPhos  59  12-30    PT/INR - ( 29 Dec 2022 23:09 )   PT: 11.8 sec;   INR: 1.02 ratio         PTT - ( 29 Dec 2022 23:09 )  PTT:26.9 sec      Urinalysis Basic - ( 30 Dec 2022 00:54 )    Color: Light Yellow / Appearance: Clear / S.012 / pH: x  Gluc: x / Ketone: Negative  / Bili: Negative / Urobili: Negative   Blood: x / Protein: Trace / Nitrite: Negative   Leuk Esterase: Negative / RBC: 2 /hpf / WBC 1 /HPF   Sq Epi: x / Non Sq Epi: 0 /hpf / Bacteria: Negative        RADIOLOGY & ADDITIONAL TESTS:    Imaging Personally Reviewed:    Consultant(s) Notes Reviewed:      Care Discussed with Consultants/Other Providers:

## 2022-12-30 NOTE — DIETITIAN INITIAL EVALUATION ADULT - NS FNS DIET ORDER
Diet, Regular:   Supplement Feeding Modality:  Oral  Ensure Enlive Cans or Servings Per Day:  3       Frequency:  Three Times a day (12-30-22 @ 12:08)

## 2022-12-30 NOTE — DIETITIAN INITIAL EVALUATION ADULT - SIGNS/SYMPTOMS
moderate findings of muscle/fat depletion, 20% weight loss x 9 months, <75% EER x >/1 month pt with rectal CA on chemotherapy/radiation treatments

## 2022-12-30 NOTE — PROGRESS NOTE ADULT - PROBLEM SELECTOR PLAN 3
dvt ppx: lovenox  diet: regular  ambulate: with assistance  gi ppx: home ppi    fall precautions  aspiration precautions    PT consult.

## 2022-12-30 NOTE — DIETITIAN INITIAL EVALUATION ADULT - ORAL INTAKE PTA/DIET HISTORY
Pt seen laying in bed with wife and daughter at bedside. Family endorses poor appetite and PO intake PTA. Pt reports consuming 3 meals a day, does not follow any therapeutic diet restrictions at home. Confirms no known food allergies or intolerances. Pt denies hx of chewing or swallowing difficulties. Confirms use of Boost oral nutritional supplements, denies use of multivitamins. Pt with hx of DM, reports checking finger sticks 2x/day with range between 120-300 mg/dl. Confirms use of Metformin 500 mg. HbA1c not documented in chart.

## 2022-12-30 NOTE — DIETITIAN INITIAL EVALUATION ADULT - ETIOLOGY
decreased ability to consume adequate protein-energy in setting of increased physiological demand for nutrients

## 2022-12-30 NOTE — H&P ADULT - HISTORY OF PRESENT ILLNESS
78yo M w/ PMHx of colon ca (on chemo/radiation), HTN, HLD, DM2, presents with hypotension, history obtained from granddaughter Halley via telephone, she reports that since initiating cancer treatment, patient has been weak and with poor appetitie, he was prescribed megestrol to help but did not help, he has gotten progressively weaker, to the point where he can no longer walk on his own and had multiple falls, and has lost 50lbs due to poor PO intake, she measured his blood pressure at home today and it was low so she brought him to the ED for further management, in the ED, pt was hypotensive and tachycardic but fluid responsive, labs were notable for elevated lactic on VBG, imaging obtained did not reveal any acute findings, pt was given 1L NS, 1L LR, started on LR gtt, Mg sulfate, admitted to general medicine for further management  76yo M w/ PMHx of rectal ca (on chemo/radiation), HTN, HLD, DM2, presents with hypotension, history obtained from granddaughter Halley via telephone, she reports that since initiating cancer treatment, patient has been weak and with poor appetitie, he was prescribed megestrol to help but did not help, he has gotten progressively weaker, to the point where he can no longer walk on his own and had multiple falls, and has lost 50lbs due to poor PO intake, she measured his blood pressure at home today and it was low so she brought him to the ED for further management, in the ED, pt was hypotensive and tachycardic but fluid responsive, labs were notable for elevated lactic on VBG, imaging obtained did not reveal any acute findings, pt was given 1L NS, 1L LR, started on LR gtt, Mg sulfate, admitted to general medicine for further management

## 2022-12-30 NOTE — PHYSICAL THERAPY INITIAL EVALUATION ADULT - NSPTDISCHREC_GEN_A_CORE
DC home with home PT services, assist from family for mobility/ADLs, owns rolling walker (recent newly owned), +5 steps with railing to living level in home through garage, CM to be notified./Home PT

## 2022-12-30 NOTE — H&P ADULT - PROBLEM SELECTOR PLAN 4
dvt ppx: lovenox  diet: regular  ambulate: with assistance  gi ppx: home ppi    fall precautions  aspiration precautions    PT consult

## 2022-12-30 NOTE — DIETITIAN INITIAL EVALUATION ADULT - HEIGHT FOR BMI (CENTIMETERS)
Anemia    Gestational HTN, unspecified trimester  2016  Hyperemesis gravidarum    Hyperemesis gravidarum    Pre-eclampsia  2016  Preeclampsia    Vaginal delivery  2016 boy 36wks 3-7 PEC on mag
167.64

## 2022-12-30 NOTE — DIETITIAN INITIAL EVALUATION ADULT - REASON INDICATOR FOR ASSESSMENT
Nutrition consult warranted for: poor PO intake PTA  Information obtained from: electronic medical record, patient, wife and daughter at bedside.   Chart reviewed, events noted.

## 2022-12-30 NOTE — DIETITIAN INITIAL EVALUATION ADULT - REASON FOR ADMISSION
Per chart, "76yo M w/ PMHx of rectal ca (on chemo/radiation), HTN, HLD, DM2, presents with hypotension, history obtained from granddaughter Halley via telephone, she reports that since initiating cancer treatment, patient has been weak and with poor appetitie, he was prescribed megestrol to help but did not help, he has gotten progressively weaker, to the point where he can no longer walk on his own and had multiple falls, and has lost 50lbs due to poor PO intake, she measured his blood pressure at home today and it was low so she brought him to the ED for further management, in the ED, pt was hypotensive and tachycardic but fluid responsive, labs were notable for elevated lactic on VBG, imaging obtained did not reveal any acute findings, pt was given 1L NS, 1L LR, started on LR gtt, Mg sulfate, admitted to general medicine for further management"

## 2022-12-30 NOTE — DIETITIAN INITIAL EVALUATION ADULT - OTHER INFO
Weight Hx Per:  - Source: patient   - UBW: unknown   - Reported weight changes: 34 pound (20 %) weight loss x 9 months noted. (4/13/22 wt vs dosing wt)    Weight Hx Per [facility]: 71.2 kg (10/22/22), 74.8 kg (7/7/2022), 78 kg (4/13/22)    Current Admission Weights:  - Dosing weight: 138 pounds (62.6 kg) 12/29   - Daily weight: none documented thus far    **  Will continue to monitor weight trends as available/able.   IBW: 142 pounds   %IBW: 97%

## 2022-12-30 NOTE — DIETITIAN INITIAL EVALUATION ADULT - PERTINENT MEDS FT
MEDICATIONS  (STANDING):  atorvastatin 20 milliGRAM(s) Oral at bedtime  dextrose 5%. 1000 milliLiter(s) (50 mL/Hr) IV Continuous <Continuous>  dextrose 5%. 1000 milliLiter(s) (100 mL/Hr) IV Continuous <Continuous>  dextrose 50% Injectable 25 Gram(s) IV Push once  dextrose 50% Injectable 12.5 Gram(s) IV Push once  dextrose 50% Injectable 25 Gram(s) IV Push once  enoxaparin Injectable 40 milliGRAM(s) SubCutaneous every 24 hours  folic acid 1 milliGRAM(s) Oral daily  glucagon  Injectable 1 milliGRAM(s) IntraMuscular once  insulin lispro (ADMELOG) corrective regimen sliding scale   SubCutaneous three times a day before meals  insulin lispro (ADMELOG) corrective regimen sliding scale   SubCutaneous at bedtime  megestrol Suspension 800 milliGRAM(s) Oral daily  multivitamin 1 Tablet(s) Oral daily  pantoprazole    Tablet 40 milliGRAM(s) Oral before breakfast  sodium chloride 0.9%. 1000 milliLiter(s) (75 mL/Hr) IV Continuous <Continuous>    MEDICATIONS  (PRN):  acetaminophen     Tablet .. 650 milliGRAM(s) Oral every 6 hours PRN Temp greater or equal to 38C (100.4F), Mild Pain (1 - 3)  dextrose Oral Gel 15 Gram(s) Oral once PRN Blood Glucose LESS THAN 70 milliGRAM(s)/deciliter

## 2022-12-30 NOTE — H&P ADULT - PROBLEM SELECTOR PLAN 1
-in setting of poor PO intake, hypotension and electrolyte abnormalities  -s/p 2L fluid in ED now on maintenance LR 100ml/hr  -trend lactate and electrolytes   -encourage PO intake  -c/w home megestrol (granddaughter reports possible non-compliance)  -hold all home blood pressure medications

## 2022-12-30 NOTE — DIETITIAN NUTRITION RISK NOTIFICATION - TREATMENT: THE FOLLOWING DIET HAS BEEN RECOMMENDED
Diet, Regular:   Supplement Feeding Modality:  Oral  Ensure Enlive Cans or Servings Per Day:  3       Frequency:  Three Times a day (12-30-22 @ 12:08) [Active]

## 2022-12-30 NOTE — H&P ADULT - NSHPPHYSICALEXAM_GEN_ALL_CORE
Vital Signs Last 24 Hrs  T(C): 36.8 (30 Dec 2022 04:34), Max: 37.1 (29 Dec 2022 22:09)  T(F): 98.3 (30 Dec 2022 04:34), Max: 98.8 (29 Dec 2022 22:09)  HR: 90 (30 Dec 2022 06:10) (87 - 116)  BP: 100/56 (30 Dec 2022 06:10) (84/42 - 119/65)  BP(mean): 69 (30 Dec 2022 06:10) (68 - 80)  RR: 19 (30 Dec 2022 06:10) (16 - 20)  SpO2: 98% (30 Dec 2022 06:10) (98% - 100%)    Parameters below as of 30 Dec 2022 06:10  Patient On (Oxygen Delivery Method): room air

## 2022-12-30 NOTE — H&P ADULT - PROBLEM SELECTOR PLAN 2
-reviewed outpatient records in [facility]  -pt completed FOLFOX adjuvant chemo and was next planned for possible surgical resection (had upcoming surgical appt for this month)  -also on radiation therapy -reviewed outpatient records in [facility]  -pt completed FOLFOX adjuvant chemo this month and was next planned for possible surgical resection (had upcoming surgical appt for this month)  -also completed radiation therapy 97.5

## 2022-12-30 NOTE — PHYSICAL THERAPY INITIAL EVALUATION ADULT - PLANNED THERAPY INTERVENTIONS, PT EVAL
stair neg: GOAL: pt will neg 5 steps 1 HR ind in 2wks./bed mobility training/gait training/strengthening/transfer training

## 2022-12-30 NOTE — PATIENT PROFILE ADULT - DO YOU LACK THE NECESSARY SUPPORT TO HELP YOU COPE WITH LIFE CHALLENGES?
Physician Progress Note      Shannan Mcintyre  CSN #:                  [id number]  :                       1951  ADMIT DATE:       2022 4:24 PM  [address] [place] DATE:  RESPONDING  PROVIDER #:        Gweneth Schilder          QUERY TEXT:    Patient admitted with acute respiratory failure, CAD, pneumonia with sepsis   and septic encephalopathy . Noted documentation of lab tests point away from   infectious pneumonia and monitor off antibiotics per ID consult. per   admission labs wbc 21.9, lymphocytes 4% , lactic acid 2.9, procalcitonin 8.58,   trop 448>225>387 cxr diffuse bilateral interstitial opacities may be pulm   edema or atypical viral infection  If possible please clarify one of the   following      The medical record reflects the following:  Risk Factors:age, cad, acute resp failure  Clinical Indicators:  admitted with acute respiratory failure, CAD, pneumonia   with sepsis and septic encephalopathy . Noted documentation of lab tests   point away from infectious pneumonia and monitor off antibiotics per ID   consult  Treatment: id consult,cxr, lab monitoring    Thank Karl Kaur RN BSN  CCDS  Email [email or web address]  Cell [phone]  office hours M-F 6am to 2:30pm  Options provided:  -- Sepsis with pneumonia confirmed  -- Sepsis with pneumonia ruled out with metabolic encephalopathy confirmed  -- Sepsis with pneumonia confirmed and resolved  -- Other - I will add my own diagnosis  -- Disagree - Not applicable / Not valid  -- Disagree - Clinically unable to determine / Unknown  -- Refer to Clinical Documentation Reviewer    PROVIDER RESPONSE TEXT:    the diagnosis of sepsis with pneumonia is confirmed    Query created by:  Narciso Cherry on 2022 1:17 PM      Electronically signed by:  Gweneth Schilder 2022 1:23 PM no

## 2022-12-30 NOTE — H&P ADULT - ASSESSMENT
78yo M w/ PMHx of Colon Ca (on active chemo/radiation), HTN, HLD, DM2, presents with failure to thrive

## 2022-12-30 NOTE — PHYSICAL THERAPY INITIAL EVALUATION ADULT - GENERAL OBSERVATIONS, REHAB EVAL
Pt with hx rectal Ca (chemo/radiation) a/w generalized weakness, frequent falls, hypotension, imaging (-). Pt received in ED on stretcher, A&Ox4, follows commands, +IVL, request to use urinal.

## 2022-12-30 NOTE — DIETITIAN INITIAL EVALUATION ADULT - PERTINENT LABORATORY DATA
12-30    131<L>  |  101  |  33<H>  ----------------------------<  203<H>  4.6   |  18<L>  |  1.11    Ca    10.0      30 Dec 2022 09:55  Phos  3.3     12-30  Mg     1.9     12-30    TPro  6.3  /  Alb  3.9  /  TBili  0.3  /  DBili  x   /  AST  28  /  ALT  37  /  AlkPhos  59  12-30  POCT Blood Glucose.: 205 mg/dL (12-30-22 @ 12:35)

## 2022-12-30 NOTE — PROGRESS NOTE ADULT - ATTENDING COMMENTS
Pt seen and examined, H+P from overnight reviewed    77M with h/o rectal cancer, receiving chemo and then RT this past 6 months p/w FTT - pt endorsing feeling progressive weakness, poor PO intake 2/2 loss of appetitie and about 40lb weight loss since treatment began. Yesterday brought in to ED as he was so weak he was having difficulty ambulating.   Labs notable for hypoNa, SHELLI and elevated lactate and pancytopenia. CTH negative fo acute pathology.     This morning, pt states he feels better after receiving IVF overnight. Still has poor appetite.     Plan:  - Cr improving to baseline of 1-1.1; lactate now normalized   - c/w IVF today, encourage PO intake   - diet with ensures  - PT recs home PT with DME  - pt had appt with surgeon Dr Barcenas re: possible surgical planning on 1/4

## 2022-12-30 NOTE — DIETITIAN INITIAL EVALUATION ADULT - NSFNSGIIOFT_GEN_A_CORE
- Pt denies nausea, vomiting, diarrhea, or constipation.   - Last BM: none thus far; not currently ordered for bowel regimen

## 2022-12-30 NOTE — DIETITIAN INITIAL EVALUATION ADULT - ENERGY INTAKE
Pt reports poor appetite and PO intake since admission, is currently ordered for a no therapeutic diet restrictions. Pt is amenable to oral nutrition supplements at this time. Pt would like chocolate Glucerna. Pt made aware of menu ordering procedures in house, food preferences obtained will honor as able. Pt confirms having no further questions at this time.     - Nutrition-related concerns:   - Pt ordered for Megace in house.   - Ordered for multivitamin and folic acid.     Endo: BG being managed with SSI.

## 2022-12-30 NOTE — PHYSICAL THERAPY INITIAL EVALUATION ADULT - PERTINENT HX OF CURRENT PROBLEM, REHAB EVAL
Pt is a 78yo M admitted to [facility] on 12/30/22 w/ PMHx of rectal ca (on chemo/radiation), HTN, HLD, DM2, presents with hypotension, history obtained from granddaughter Halley via telephone, she reports that since initiating cancer treatment, pt has been weak and with poor appetitie, he was prescribed megestrol to help but did not help, he has gotten progressively weaker, to the point where he can no longer walk on his own and had multiple falls, and has lost 50lbs due to poor PO intake, she measured his blood pressure at home today and it was low so she brought him to the ED for further management, Hospital course: in the ED, pt was hypotensive and tachycardic but fluid responsive, labs were notable for elevated lactic on VBG, imaging obtained did not reveal any acute findings, pt was given 1L NS, 1L LR, started on LR gtt, Mg sulfate, admitted to general medicine for further management.  CT Head: No acute intracranial hemorrhage or calvarial fracture. CT cervical spine: No acute cervical spine fracture or evidence of traumatic malalignment. Severe multilevel cervical spondylosis. Xray pelvis: No acute fracture or dislocation.

## 2022-12-30 NOTE — PHYSICAL THERAPY INITIAL EVALUATION ADULT - ADDITIONAL COMMENTS
Pt lives at home with spouse and brother in law, +no stairs to enter through garage, stairs upon enter home +5 steps with railing to main level; stairs down to kitchen with railing; PTA amb no device, ind dressing, spouse assists with showers, +shower chair; recently weaker after chemo/radiation treatments, rolling walker recently newly owned- has not used; +admits to frequent falls

## 2022-12-31 ENCOUNTER — TRANSCRIPTION ENCOUNTER (OUTPATIENT)
Age: 77
End: 2022-12-31

## 2022-12-31 LAB
A1C WITH ESTIMATED AVERAGE GLUCOSE RESULT: 12 % — HIGH (ref 4–5.6)
ANION GAP SERPL CALC-SCNC: 16 MMOL/L — SIGNIFICANT CHANGE UP (ref 5–17)
BUN SERPL-MCNC: 28 MG/DL — HIGH (ref 7–23)
CALCIUM SERPL-MCNC: 9.7 MG/DL — SIGNIFICANT CHANGE UP (ref 8.4–10.5)
CHLORIDE SERPL-SCNC: 101 MMOL/L — SIGNIFICANT CHANGE UP (ref 96–108)
CO2 SERPL-SCNC: 16 MMOL/L — LOW (ref 22–31)
CREAT SERPL-MCNC: 1.09 MG/DL — SIGNIFICANT CHANGE UP (ref 0.5–1.3)
EGFR: 70 ML/MIN/1.73M2 — SIGNIFICANT CHANGE UP
ESTIMATED AVERAGE GLUCOSE: 298 MG/DL — HIGH (ref 68–114)
GLUCOSE BLDC GLUCOMTR-MCNC: 258 MG/DL — HIGH (ref 70–99)
GLUCOSE BLDC GLUCOMTR-MCNC: 317 MG/DL — HIGH (ref 70–99)
GLUCOSE BLDC GLUCOMTR-MCNC: 322 MG/DL — HIGH (ref 70–99)
GLUCOSE BLDC GLUCOMTR-MCNC: 343 MG/DL — HIGH (ref 70–99)
GLUCOSE BLDC GLUCOMTR-MCNC: 387 MG/DL — HIGH (ref 70–99)
GLUCOSE SERPL-MCNC: 326 MG/DL — HIGH (ref 70–99)
HCT VFR BLD CALC: 25.4 % — LOW (ref 39–50)
HCV AB S/CO SERPL IA: 0.12 S/CO — SIGNIFICANT CHANGE UP (ref 0–0.99)
HCV AB SERPL-IMP: SIGNIFICANT CHANGE UP
HGB BLD-MCNC: 8.5 G/DL — LOW (ref 13–17)
LACTATE SERPL-SCNC: 3.7 MMOL/L — HIGH (ref 0.5–2)
LACTATE SERPL-SCNC: 3.8 MMOL/L — HIGH (ref 0.5–2)
MAGNESIUM SERPL-MCNC: 1.6 MG/DL — SIGNIFICANT CHANGE UP (ref 1.6–2.6)
MCHC RBC-ENTMCNC: 32 PG — SIGNIFICANT CHANGE UP (ref 27–34)
MCHC RBC-ENTMCNC: 33.5 GM/DL — SIGNIFICANT CHANGE UP (ref 32–36)
MCV RBC AUTO: 95.5 FL — SIGNIFICANT CHANGE UP (ref 80–100)
NRBC # BLD: 0 /100 WBCS — SIGNIFICANT CHANGE UP (ref 0–0)
PHOSPHATE SERPL-MCNC: 3.4 MG/DL — SIGNIFICANT CHANGE UP (ref 2.5–4.5)
PLATELET # BLD AUTO: 141 K/UL — LOW (ref 150–400)
POTASSIUM SERPL-MCNC: 4.7 MMOL/L — SIGNIFICANT CHANGE UP (ref 3.5–5.3)
POTASSIUM SERPL-SCNC: 4.7 MMOL/L — SIGNIFICANT CHANGE UP (ref 3.5–5.3)
RBC # BLD: 2.66 M/UL — LOW (ref 4.2–5.8)
RBC # FLD: 14.6 % — HIGH (ref 10.3–14.5)
SODIUM SERPL-SCNC: 133 MMOL/L — LOW (ref 135–145)
WBC # BLD: 3.84 K/UL — SIGNIFICANT CHANGE UP (ref 3.8–10.5)
WBC # FLD AUTO: 3.84 K/UL — SIGNIFICANT CHANGE UP (ref 3.8–10.5)

## 2022-12-31 PROCEDURE — 99232 SBSQ HOSP IP/OBS MODERATE 35: CPT | Mod: GC

## 2022-12-31 RX ORDER — SODIUM CHLORIDE 9 MG/ML
1000 INJECTION INTRAMUSCULAR; INTRAVENOUS; SUBCUTANEOUS
Refills: 0 | Status: COMPLETED | OUTPATIENT
Start: 2022-12-31 | End: 2022-12-31

## 2022-12-31 RX ORDER — INSULIN GLARGINE 100 [IU]/ML
15 INJECTION, SOLUTION SUBCUTANEOUS AT BEDTIME
Refills: 0 | Status: DISCONTINUED | OUTPATIENT
Start: 2022-12-31 | End: 2023-01-01

## 2022-12-31 RX ORDER — INSULIN LISPRO 100/ML
2 VIAL (ML) SUBCUTANEOUS
Refills: 0 | Status: DISCONTINUED | OUTPATIENT
Start: 2022-12-31 | End: 2022-12-31

## 2022-12-31 RX ORDER — CHLORHEXIDINE GLUCONATE 213 G/1000ML
1 SOLUTION TOPICAL DAILY
Refills: 0 | Status: DISCONTINUED | OUTPATIENT
Start: 2022-12-31 | End: 2023-01-01

## 2022-12-31 RX ORDER — INSULIN GLARGINE 100 [IU]/ML
7 INJECTION, SOLUTION SUBCUTANEOUS AT BEDTIME
Refills: 0 | Status: DISCONTINUED | OUTPATIENT
Start: 2022-12-31 | End: 2022-12-31

## 2022-12-31 RX ORDER — INSULIN LISPRO 100/ML
5 VIAL (ML) SUBCUTANEOUS
Refills: 0 | Status: DISCONTINUED | OUTPATIENT
Start: 2022-12-31 | End: 2023-01-01

## 2022-12-31 RX ADMIN — Medication 4: at 12:55

## 2022-12-31 RX ADMIN — Medication 1 MILLIGRAM(S): at 12:49

## 2022-12-31 RX ADMIN — INSULIN GLARGINE 15 UNIT(S): 100 INJECTION, SOLUTION SUBCUTANEOUS at 22:46

## 2022-12-31 RX ADMIN — ATORVASTATIN CALCIUM 20 MILLIGRAM(S): 80 TABLET, FILM COATED ORAL at 22:47

## 2022-12-31 RX ADMIN — MEGESTROL ACETATE 800 MILLIGRAM(S): 40 SUSPENSION ORAL at 12:50

## 2022-12-31 RX ADMIN — Medication 5 UNIT(S): at 17:14

## 2022-12-31 RX ADMIN — INSULIN GLARGINE 5 UNIT(S): 100 INJECTION, SOLUTION SUBCUTANEOUS at 01:24

## 2022-12-31 RX ADMIN — SODIUM CHLORIDE 75 MILLILITER(S): 9 INJECTION INTRAMUSCULAR; INTRAVENOUS; SUBCUTANEOUS at 11:28

## 2022-12-31 RX ADMIN — Medication 1 TABLET(S): at 12:49

## 2022-12-31 RX ADMIN — Medication 4: at 17:14

## 2022-12-31 RX ADMIN — Medication 3: at 22:46

## 2022-12-31 RX ADMIN — PANTOPRAZOLE SODIUM 40 MILLIGRAM(S): 20 TABLET, DELAYED RELEASE ORAL at 05:24

## 2022-12-31 RX ADMIN — Medication 2 UNIT(S): at 12:58

## 2022-12-31 RX ADMIN — CHLORHEXIDINE GLUCONATE 1 APPLICATION(S): 213 SOLUTION TOPICAL at 12:56

## 2022-12-31 RX ADMIN — ENOXAPARIN SODIUM 40 MILLIGRAM(S): 100 INJECTION SUBCUTANEOUS at 12:49

## 2022-12-31 RX ADMIN — SODIUM CHLORIDE 75 MILLILITER(S): 9 INJECTION INTRAMUSCULAR; INTRAVENOUS; SUBCUTANEOUS at 22:47

## 2022-12-31 NOTE — DISCHARGE NOTE PROVIDER - CARE PROVIDER_API CALL
Melinda Pereira  INTERNAL MEDICINE  [address] [place] [address]  Phone: [phone]  Fax: [phone]  Established Patient  Follow Up Time: 1 week

## 2022-12-31 NOTE — PROGRESS NOTE ADULT - PROBLEM SELECTOR PLAN 3
dvt ppx: lovenox  diet: regular  ambulate: with assistance  gi ppx: home ppi    fall precautions  aspiration precautions    PT consult. dvt ppx: lovenox  diet: regular  ambulate: with assistance  gi ppx: home ppi    fall precautions  aspiration precautions    PT rec Home PT

## 2022-12-31 NOTE — DISCHARGE NOTE PROVIDER - NSDCMRMEDTOKEN_GEN_ALL_CORE_FT
amLODIPine 5 mg oral tablet: 1 tab(s) orally once a day  atorvastatin 20 mg oral tablet: 1 tab(s) orally once a day  folic acid 1 mg oral tablet: 1 tab(s) orally once a day  glipiZIDE 10 mg oral tablet: 1 tab(s) orally once a day  megestrol 40 mg/mL oral suspension: 20 milliliter(s) orally once a day  metFORMIN 500 mg oral tablet: 1 tab(s) orally 2 times a day  metoprolol succinate 25 mg oral tablet, extended release: 1 tab(s) orally once a day  pantoprazole 40 mg oral delayed release tablet: 1 tab(s) orally once a day  ramipril 10 mg oral capsule: 1 cap(s) orally once a day  terazosin 5 mg oral capsule: 1 cap(s) orally once a day (at bedtime)   atorvastatin 20 mg oral tablet: 1 tab(s) orally once a day  folic acid 1 mg oral tablet: 1 tab(s) orally once a day  glipiZIDE 10 mg oral tablet: 1 tab(s) orally once a day  megestrol 40 mg/mL oral suspension: 20 milliliter(s) orally once a day  metFORMIN 1000 mg oral tablet: 1 tab(s) orally 2 times a day  Multiple Vitamins oral tablet: 1 tab(s) orally once a day  pantoprazole 40 mg oral delayed release tablet: 1 tab(s) orally once a day  Tradjenta 5 mg oral tablet: 1 tab(s) orally once a day

## 2022-12-31 NOTE — DISCHARGE NOTE PROVIDER - NSDCCPTREATMENT_GEN_ALL_CORE_FT
PRINCIPAL PROCEDURE  Procedure: CT head and neck  Findings and Treatment: IMPRESSION:  CT Head: No acute intracranial hemorrhage or calvarial fracture.  CT cervical spine: No acute cervical spine fracture or evidence of   traumatic malalignment. Severe multilevel cervical spondylosis.        SECONDARY PROCEDURE  Procedure: XR pelvis, 1 view  Findings and Treatment: IMPRESSION:  No acute fracture or dislocation.

## 2022-12-31 NOTE — DISCHARGE NOTE PROVIDER - NSDCFUSCHEDAPPT_GEN_ALL_CORE_FT
Pernell Sheets  [facility]  SURGONC [id number]  Scheduled Appointment: 01/04/2023

## 2022-12-31 NOTE — PROGRESS NOTE ADULT - ATTENDING COMMENTS
Pt seen and examined. No acute events overnight. He denies cp, sob, dizziness. Reports feeling well overall.  On exam, AAOx 3, +tachycardic,  ctab, s1s2, no mrg. Abd is soft, nt, nd, bs+. No LE edema.  Labs reviewed. Elevated lactate, hyperglycemia.   start basal/bolus insulin, start ARMANDO  FS qac qhs  check Hb A1c  c/w IVF ; repeat lactate in AM

## 2022-12-31 NOTE — DISCHARGE NOTE PROVIDER - HOSPITAL COURSE
78yo M w/ PMHx of rectal ca (on chemo/radiation), HTN, HLD, DM2, presents with hypotension, history obtained from granddaughter Halley via telephone, she reports that since initiating cancer treatment, patient has been weak and with poor appetitie, he was prescribed megestrol to help but did not help, he has gotten progressively weaker, to the point where he can no longer walk on his own and had multiple falls, and has lost 50lbs due to poor PO intake, she measured his blood pressure at home today and it was low so she brought him to the ED for further management, in the ED, pt was hypotensive and tachycardic but fluid responsive, labs were notable for elevated lactic on VBG, imaging obtained did not reveal any acute findings, pt was given 1L NS, 1L LR, started on LR gtt, Mg sulfate, admitted to general medicine for further management . Pt felt improved with gentle IVF. PT recommended home PT. 78yo M w/ PMHx of rectal ca (on chemo/radiation), HTN, HLD, DM2, presents with hypotension, history obtained from granddaughter Halley via telephone, she reports that since initiating cancer treatment, patient has been weak and with poor appetitie, he was prescribed megestrol to help but did not help, he has gotten progressively weaker, to the point where he can no longer walk on his own and had multiple falls, and has lost 50lbs due to poor PO intake, she measured his blood pressure at home today and it was low so she brought him to the ED for further management, in the ED, pt was hypotensive and tachycardic but fluid responsive, labs were notable for elevated lactic acid on VBG, imaging obtained of the head, chest, and pelvis did not reveal any acute findings, pt was given 1L NS, 1L LR, started on LR gtt, Mg sulfate, admitted to general medicine for further management . Pt felt improved with gentle IVF. Pt improved in lactic acid and electrolytes after increasing PO intake, then became hyperglycemic with worsening lactic acid, continued on IVF hydration and basal-bolus insulin in consultation with endocrinology. Patient's lactate was trended to normal and after good glycemic control achieved, decision was made to discharge patient in stable condition. 78yo M w/ PMHx of rectal ca (on chemo/radiation), HTN, HLD, DM2, presents with hypotension, history obtained from granddaughter Halley via telephone, she reports that since initiating cancer treatment, patient has been weak and with poor appetitie, he was prescribed megestrol to help but did not help, he has gotten progressively weaker, to the point where he can no longer walk on his own and had multiple falls, and has lost 50lbs due to poor PO intake, she measured his blood pressure at home today and it was low so she brought him to the ED for further management, in the ED, pt was hypotensive and tachycardic but fluid responsive, labs were notable for elevated lactic acid on VBG, imaging obtained of the head, chest, and pelvis did not reveal any acute findings, pt was given 1L NS, 1L LR, started on LR gtt, Mg sulfate, admitted to general medicine for further management . Pt felt improved with gentle IVF. Pt improved in lactic acid and electrolytes after increasing PO intake, then became hyperglycemic with worsening lactic acid, continued on IVF hydration and basal-bolus insulin in consultation with endocrinology. Patient's lactate was trended to normal and after good glycemic control achieved, decision was made to discharge patient in stable condition with PCP follow-up and Home PT. Pt to follow-up with surgical oncology as scheduled. 78yo M w/ PMHx of rectal ca (on chemo/radiation), HTN, HLD, DM2, presents with hypotension, history obtained from granddaughter Halley via telephone, she reports that since initiating cancer treatment, patient has been weak and with poor appetitie, he was prescribed megestrol to help but did not help, he has gotten progressively weaker, to the point where he can no longer walk on his own and had multiple falls, and has lost 50lbs due to poor PO intake, she measured his blood pressure at home today and it was low so she brought him to the ED for further management, in the ED, pt was hypotensive and tachycardic but fluid responsive, labs were notable for elevated lactic acid on VBG, imaging obtained of the head, chest, and pelvis did not reveal any acute findings, pt was given 1L NS, 1L LR, started on LR gtt, Mg sulfate, admitted to general medicine for further management . Pt felt improved with gentle IVF. Pt improved in lactic acid and electrolytes after increasing PO intake, then became hyperglycemic with worsening lactic acid, continued on IVF hydration and basal-bolus insulin in consultation with endocrinology. Patient's lactate was while elevated, stayed consistent between 2-4 and patient showed no signs of shock. Patient was deemed to have lactate B1 in the setting of colorectal cancer. Endocrinology was consulted but patient did not want insulin per endo recs and was recommended an alternative PO regimen per endo. The decision was made to discharge patient in stable condition with PCP follow-up and Home PT. Pt to follow-up with surgical oncology as scheduled. Per daughter, patient also scheduled an appointment with his personal endocrinologist for further diabetic control soon after discharge.

## 2022-12-31 NOTE — PROGRESS NOTE ADULT - PROBLEM SELECTOR PLAN 2
-hold home metformin and glipizide  -start insulin sliding scale  -c/w home atorvastatin. -hold home metformin and glipizide  -A1c elevated at 12, similar to prior earlier this month, endo c/s appreciated  -c/w home atorvastatin.

## 2022-12-31 NOTE — CHART NOTE - NSCHARTNOTEFT_GEN_A_CORE
Brief Endocrine Chart Note:    This is a 76 yo M /w a PMh of rectal cancer, DM2 who presents with failure to thrive. Now noted to be eating. FSG in 300s. Home meds listed as glipizide 10mg daily and metformin 500mg twice per day.    Recommendations:  Ensure adequate hydration  can start lantus 15 units tonight  start admelog 5 units before measl  low admelog correction scales before meals and before bedtime  FSG goal 100-180  A1c target <8%, now 12%  carb consistent diet  hypoglycemia protocol in place if needed    Full consult tomorrow    Perez Pang MD  Endocrine Fellow  Can be reached via teams. For follow up questions, discharge recommendations, or new consults, please call answering service at [phone] (weekdays); [phone] (nights/weekends)

## 2022-12-31 NOTE — DISCHARGE NOTE PROVIDER - DETAILS OF MALNUTRITION DIAGNOSIS/DIAGNOSES
This patient has been assessed with a concern for Malnutrition and was treated during this hospitalization for the following Nutrition diagnosis/diagnoses:     -  12/30/2022: Severe protein-calorie malnutrition

## 2022-12-31 NOTE — PROGRESS NOTE ADULT - SUBJECTIVE AND OBJECTIVE BOX
DATE OF SERVICE: 22 @ 06:36    Patient is a 77y old  Male who presents with a chief complaint of Per chart, "76yo M w/ PMHx of rectal ca (on chemo/radiation), HTN, HLD, DM2, presents with hypotension, history obtained from granddaughter Halley via telephone, she reports that since initiating cancer treatment, patient has been weak and with poor appetitie, he was prescribed megestrol to help but did not help, he has gotten progressively weaker, to the point where he can no longer walk on his own and had multiple falls, and has lost 50lbs due to poor PO intake, she measured his blood pressure at home today and it was low so she brought him to the ED for further management, in the ED, pt was hypotensive and tachycardic but fluid responsive, labs were notable for elevated lactic on VBG, imaging obtained did not reveal any acute findings, pt was given 1L NS, 1L LR, started on LR gtt, Mg sulfate, admitted to general medicine for further management"     (30 Dec 2022 16:32)      SUBJECTIVE / OVERNIGHT EVENTS:  No acute events overnight, patient reports doing well and all questions answered at this time.     Additional Review of Systems:  Denies any other acute sx including f/c, HA, cough, sore throat, eye/ear changes, rhinorrhea, CP, palpitations, SOB, n/v, abdominal pain, back pain, bowel/bladder changes, fatigue, numbness or tingling.    MEDICATIONS  (STANDING):  atorvastatin 20 milliGRAM(s) Oral at bedtime  dextrose 5%. 1000 milliLiter(s) (50 mL/Hr) IV Continuous <Continuous>  dextrose 5%. 1000 milliLiter(s) (100 mL/Hr) IV Continuous <Continuous>  dextrose 50% Injectable 25 Gram(s) IV Push once  dextrose 50% Injectable 12.5 Gram(s) IV Push once  dextrose 50% Injectable 25 Gram(s) IV Push once  enoxaparin Injectable 40 milliGRAM(s) SubCutaneous every 24 hours  folic acid 1 milliGRAM(s) Oral daily  glucagon  Injectable 1 milliGRAM(s) IntraMuscular once  insulin lispro (ADMELOG) corrective regimen sliding scale   SubCutaneous three times a day before meals  insulin lispro (ADMELOG) corrective regimen sliding scale   SubCutaneous at bedtime  megestrol Suspension 800 milliGRAM(s) Oral daily  multivitamin 1 Tablet(s) Oral daily  pantoprazole    Tablet 40 milliGRAM(s) Oral before breakfast  sodium chloride 0.9%. 1000 milliLiter(s) (75 mL/Hr) IV Continuous <Continuous>    MEDICATIONS  (PRN):  acetaminophen     Tablet .. 650 milliGRAM(s) Oral every 6 hours PRN Temp greater or equal to 38C (100.4F), Mild Pain (1 - 3)  dextrose Oral Gel 15 Gram(s) Oral once PRN Blood Glucose LESS THAN 70 milliGRAM(s)/deciliter      Vital Signs Last 24 Hrs  T(C): 37.4 (31 Dec 2022 06:09), Max: 37.4 (30 Dec 2022 08:36)  T(F): 99.3 (31 Dec 2022 06:09), Max: 99.3 (30 Dec 2022 08:36)  HR: 93 (31 Dec 2022 06:09) (86 - 104)  BP: 122/71 (31 Dec 2022 06:09) (109/68 - 122/71)  BP(mean): --  RR: 18 (31 Dec 2022 06:09) (16 - 18)  SpO2: 100% (31 Dec 2022 06:09) (100% - 100%)    Parameters below as of 31 Dec 2022 06:09  Patient On (Oxygen Delivery Method): room air      CAPILLARY BLOOD GLUCOSE      POCT Blood Glucose.: 322 mg/dL (31 Dec 2022 01:23)  POCT Blood Glucose.: 271 mg/dL (30 Dec 2022 21:47)  POCT Blood Glucose.: 282 mg/dL (30 Dec 2022 17:20)  POCT Blood Glucose.: 205 mg/dL (30 Dec 2022 12:35)    I&O's Summary    30 Dec 2022 07:01  -  31 Dec 2022 06:36  --------------------------------------------------------  IN: 50 mL / OUT: 600 mL / NET: -550 mL        PHYSICAL EXAM:  GENERAL: Clinically well-appearing and comfortable  HEAD:  Atraumatic, Normocephalic  EYES: EOMI, PERRLA, conjunctiva and sclera clear  NECK: Supple, No JVD  CHEST/LUNG: Clear to auscultation bilaterally; No wheeze  HEART: Regular rate and rhythm; No murmurs, rubs, or gallops  ABDOMEN: Soft, Nontender, Nondistended; Bowel sounds present  EXTREMITIES:  2+ Peripheral Pulses, No clubbing, cyanosis, or edema  PSYCH: Normal mood, Normal affect  NEUROLOGY: non-focal  SKIN: No rashes or lesions    LABS:                        8.8    3.32  )-----------( 134      ( 30 Dec 2022 09:55 )             25.6     12-30    132<L>  |  101  |  29<H>  ----------------------------<  262<H>  4.2   |  16<L>  |  1.15    Ca    9.2      30 Dec 2022 18:43  Phos  3.3     12-30  Mg     1.9     12-30    TPro  6.3  /  Alb  3.9  /  TBili  0.3  /  DBili  x   /  AST  28  /  ALT  37  /  AlkPhos  59  12-30    PT/INR - ( 29 Dec 2022 23:09 )   PT: 11.8 sec;   INR: 1.02 ratio         PTT - ( 29 Dec 2022 23:09 )  PTT:26.9 sec      Urinalysis Basic - ( 30 Dec 2022 00:54 )    Color: Light Yellow / Appearance: Clear / S.012 / pH: x  Gluc: x / Ketone: Negative  / Bili: Negative / Urobili: Negative   Blood: x / Protein: Trace / Nitrite: Negative   Leuk Esterase: Negative / RBC: 2 /hpf / WBC 1 /HPF   Sq Epi: x / Non Sq Epi: 0 /hpf / Bacteria: Negative        RADIOLOGY & ADDITIONAL TESTS:    Imaging Personally Reviewed:    Consultant(s) Notes Reviewed:      Care Discussed with Consultants/Other Providers:   DATE OF SERVICE: 22 @ 06:36    Patient is a 77y old  Male who presents with a chief complaint of Per chart, "76yo M w/ PMHx of rectal ca (on chemo/radiation), HTN, HLD, DM2, presents with hypotension, history obtained from granddaughter Halley via telephone, she reports that since initiating cancer treatment, patient has been weak and with poor appetitie, he was prescribed megestrol to help but did not help, he has gotten progressively weaker, to the point where he can no longer walk on his own and had multiple falls, and has lost 50lbs due to poor PO intake, she measured his blood pressure at home today and it was low so she brought him to the ED for further management, in the ED, pt was hypotensive and tachycardic but fluid responsive, labs were notable for elevated lactic on VBG, imaging obtained did not reveal any acute findings, pt was given 1L NS, 1L LR, started on LR gtt, Mg sulfate, admitted to general medicine for further management"     (30 Dec 2022 16:32)      SUBJECTIVE / OVERNIGHT EVENTS:  No acute events overnight, patient reports doing well and all questions answered at this time.     Additional Review of Systems:  Denies any other acute sx including f/c, HA, cough, sore throat, eye/ear changes, rhinorrhea, CP, palpitations, SOB, n/v, abdominal pain, back pain, bowel/bladder changes, fatigue, numbness or tingling.    MEDICATIONS  (STANDING):  atorvastatin 20 milliGRAM(s) Oral at bedtime  dextrose 5%. 1000 milliLiter(s) (50 mL/Hr) IV Continuous <Continuous>  dextrose 5%. 1000 milliLiter(s) (100 mL/Hr) IV Continuous <Continuous>  dextrose 50% Injectable 25 Gram(s) IV Push once  dextrose 50% Injectable 12.5 Gram(s) IV Push once  dextrose 50% Injectable 25 Gram(s) IV Push once  enoxaparin Injectable 40 milliGRAM(s) SubCutaneous every 24 hours  folic acid 1 milliGRAM(s) Oral daily  glucagon  Injectable 1 milliGRAM(s) IntraMuscular once  insulin lispro (ADMELOG) corrective regimen sliding scale   SubCutaneous three times a day before meals  insulin lispro (ADMELOG) corrective regimen sliding scale   SubCutaneous at bedtime  megestrol Suspension 800 milliGRAM(s) Oral daily  multivitamin 1 Tablet(s) Oral daily  pantoprazole    Tablet 40 milliGRAM(s) Oral before breakfast  sodium chloride 0.9%. 1000 milliLiter(s) (75 mL/Hr) IV Continuous <Continuous>    MEDICATIONS  (PRN):  acetaminophen     Tablet .. 650 milliGRAM(s) Oral every 6 hours PRN Temp greater or equal to 38C (100.4F), Mild Pain (1 - 3)  dextrose Oral Gel 15 Gram(s) Oral once PRN Blood Glucose LESS THAN 70 milliGRAM(s)/deciliter      Vital Signs Last 24 Hrs  T(C): 37.4 (31 Dec 2022 06:09), Max: 37.4 (30 Dec 2022 08:36)  T(F): 99.3 (31 Dec 2022 06:09), Max: 99.3 (30 Dec 2022 08:36)  HR: 93 (31 Dec 2022 06:09) (86 - 104)  BP: 122/71 (31 Dec 2022 06:09) (109/68 - 122/71)  BP(mean): --  RR: 18 (31 Dec 2022 06:09) (16 - 18)  SpO2: 100% (31 Dec 2022 06:09) (100% - 100%)    Parameters below as of 31 Dec 2022 06:09  Patient On (Oxygen Delivery Method): room air      CAPILLARY BLOOD GLUCOSE      POCT Blood Glucose.: 322 mg/dL (31 Dec 2022 01:23)  POCT Blood Glucose.: 271 mg/dL (30 Dec 2022 21:47)  POCT Blood Glucose.: 282 mg/dL (30 Dec 2022 17:20)  POCT Blood Glucose.: 205 mg/dL (30 Dec 2022 12:35)    I&O's Summary    30 Dec 2022 07:01  -  31 Dec 2022 06:36  --------------------------------------------------------  IN: 50 mL / OUT: 600 mL / NET: -550 mL        PHYSICAL EXAM:  GENERAL: Thin, clinically well-appearing and comfortable  HEAD:  Atraumatic, Normocephalic  EYES: EOMI, PERRLA, conjunctiva and sclera clear  NECK: Supple, No JVD  CHEST/LUNG: Clear to auscultation bilaterally; No wheeze  HEART: Regular rate and rhythm; No murmurs, rubs, or gallops  ABDOMEN: Soft, Nontender, Nondistended; Bowel sounds present  EXTREMITIES:  2+ Peripheral Pulses, No clubbing, cyanosis, or edema  PSYCH: Normal mood, Normal affect  NEUROLOGY: non-focal  SKIN: No rashes or lesions    LABS:                        8.8    3.32  )-----------( 134      ( 30 Dec 2022 09:55 )             25.6     12-30    132<L>  |  101  |  29<H>  ----------------------------<  262<H>  4.2   |  16<L>  |  1.15    Ca    9.2      30 Dec 2022 18:43  Phos  3.3     12-30  Mg     1.9     12-30    TPro  6.3  /  Alb  3.9  /  TBili  0.3  /  DBili  x   /  AST  28  /  ALT  37  /  AlkPhos  59  12-30    PT/INR - ( 29 Dec 2022 23:09 )   PT: 11.8 sec;   INR: 1.02 ratio         PTT - ( 29 Dec 2022 23:09 )  PTT:26.9 sec      Urinalysis Basic - ( 30 Dec 2022 00:54 )    Color: Light Yellow / Appearance: Clear / S.012 / pH: x  Gluc: x / Ketone: Negative  / Bili: Negative / Urobili: Negative   Blood: x / Protein: Trace / Nitrite: Negative   Leuk Esterase: Negative / RBC: 2 /hpf / WBC 1 /HPF   Sq Epi: x / Non Sq Epi: 0 /hpf / Bacteria: Negative        RADIOLOGY & ADDITIONAL TESTS:    Imaging Personally Reviewed:    Consultant(s) Notes Reviewed:      Care Discussed with Consultants/Other Providers:

## 2022-12-31 NOTE — DISCHARGE NOTE PROVIDER - NSDCCPCAREPLAN_GEN_ALL_CORE_FT
PRINCIPAL DISCHARGE DIAGNOSIS  Diagnosis: Malnutrition  Assessment and Plan of Treatment: Malnutrition is a group of symptoms that affects adults, including the elderly. These symptoms include eating too little and losing weight. People who have this may not want to be with friends, or they may not want to eat or drink. This condition is not a normal part of getting older.  What are the causes?  •A disease, such as dementia, diabetes, cancer, or lung disease.  •A health problem, such as a vitamin deficiency or a heart problem.  •A disorder, such as sadness (depression).  •A disability.  •Medicines.  •Having tooth or mouth problems.  •Neglect or being treated badly.  •In some cases, the cause may not be known.  What are the signs or symptoms?  •Loss of more than 5% of your body weight.  •Being more tired than normal after an activity.  •Having trouble getting up after sitting.  •Not feeling hungry.  •Not getting out of bed.  •Not wanting to do your normal activities.  •Sadness.  •Getting infections often.  •Bedsores.  •Taking a long time to get better after an infection, injury, or a surgery.  •Weakness.  How is this treated?  Treatment for this condition depends on the cause. It may be treated by:  •Treating a disease or disorder that is causing symptoms.  •Having talk therapy or taking medicine to treat sadness.  •Eating better, such as eating more often or taking nutritional supplements.  •Changing or stopping a medicine.  •Having physical or occupational therapy.  It often takes a team of doctors to find the right treatment.  Follow these instructions at home:  A plate with examples of foods in a healthy diet.   •Take over-the-counter and prescription medicines only as told by your doctor.  •Eat a healthy diet. Make sure that you eat enough.   •Be active. A physical therapist can help to set up a program that fits you.  •Make sure that you are safe at home.  •Have a plan for what to do if you cannot make decisions for yourself.  Contact a doctor if:  •You are not able to eat well.  •You are not able to move around.  •Y      SECONDARY DISCHARGE DIAGNOSES  Diagnosis: Hyperglycemia due to type 2 diabetes mellitus  Assessment and Plan of Treatment: Hyperglycemia is when the sugar (glucose) level in your blood is too high. High blood sugar can happen to people who have or do not have diabetes. It can be an emergency.  What are the causes?  If you have diabetes, high blood sugar may be caused by:  •Medicines that increase blood sugar or affect your control of diabetes.  •Getting less physical activity.  •Overeating.  •Being sick or injured or having an infection.  •Having surgery.  •Stress.   •Not giving yourself enough insulin (if you are taking it).  You may have high blood sugar because you have diabetes that has not been diagnosed yet.  If you do not have diabetes, high blood sugar may be caused by:  •Certain medicines.  •Stress.   •A bad illness.  •An infection.  •Having surgery.  •Diseases of the pancreas.  What are the signs or symptoms?  This condition may not cause symptoms. If you do have symptoms, they may include:  •Feeling more thirsty than normal.  •Needing to pee (urinate) more often than normal.  •Hunger.  •Feeling very tired.  •Blurry eyesight (vision).  You may get other symptoms as the condition gets worse, such as:  •Dry mouth.  •Pain in your belly (abdomen).  •Not being hungry (loss of appetite).  •Breath that smells fruity.  •Weakness.  •Weight loss that is not planned.  •A tingling or numb feeling in your hands or feet.  •A headache.  •Cuts or bruises that heal slowly.  How is this treated?  Treatment depends on the cause of your condition. Treatment may include:  •Taking medicine to control your blood sugar levels.  •Changing your medicine or dosage if you take insulin or other diabetes medicines.  •Lifestyle changes. These may include:  •Exercising more.  •Eating healthier foods.  •Losing weight.  •Treating an illness or infection.   •Checking your blood sugar more often.  •Stopping or reducing steroid medicines.  If your condition gets very bad, you will need to be treated in the hospital.

## 2023-01-01 ENCOUNTER — TRANSCRIPTION ENCOUNTER (OUTPATIENT)
Age: 78
End: 2023-01-01

## 2023-01-01 VITALS
DIASTOLIC BLOOD PRESSURE: 56 MMHG | HEART RATE: 94 BPM | RESPIRATION RATE: 18 BRPM | OXYGEN SATURATION: 100 % | TEMPERATURE: 98 F | SYSTOLIC BLOOD PRESSURE: 105 MMHG

## 2023-01-01 DIAGNOSIS — E78.5 HYPERLIPIDEMIA, UNSPECIFIED: ICD-10-CM

## 2023-01-01 DIAGNOSIS — I10 ESSENTIAL (PRIMARY) HYPERTENSION: ICD-10-CM

## 2023-01-01 LAB
A1C WITH ESTIMATED AVERAGE GLUCOSE RESULT: 11.9 % — HIGH (ref 4–5.6)
ANION GAP SERPL CALC-SCNC: 15 MMOL/L — SIGNIFICANT CHANGE UP (ref 5–17)
BUN SERPL-MCNC: 27 MG/DL — HIGH (ref 7–23)
CALCIUM SERPL-MCNC: 9.6 MG/DL — SIGNIFICANT CHANGE UP (ref 8.4–10.5)
CHLORIDE SERPL-SCNC: 103 MMOL/L — SIGNIFICANT CHANGE UP (ref 96–108)
CO2 SERPL-SCNC: 17 MMOL/L — LOW (ref 22–31)
CREAT SERPL-MCNC: 1 MG/DL — SIGNIFICANT CHANGE UP (ref 0.5–1.3)
EGFR: 78 ML/MIN/1.73M2 — SIGNIFICANT CHANGE UP
ESTIMATED AVERAGE GLUCOSE: 295 MG/DL — HIGH (ref 68–114)
GLUCOSE BLDC GLUCOMTR-MCNC: 188 MG/DL — HIGH (ref 70–99)
GLUCOSE BLDC GLUCOMTR-MCNC: 233 MG/DL — HIGH (ref 70–99)
GLUCOSE BLDC GLUCOMTR-MCNC: 300 MG/DL — HIGH (ref 70–99)
GLUCOSE SERPL-MCNC: 244 MG/DL — HIGH (ref 70–99)
HCT VFR BLD CALC: 25.4 % — LOW (ref 39–50)
HGB BLD-MCNC: 8.5 G/DL — LOW (ref 13–17)
LACTATE SERPL-SCNC: 2.1 MMOL/L — HIGH (ref 0.5–2)
LACTATE SERPL-SCNC: 3.9 MMOL/L — HIGH (ref 0.5–2)
LDH SERPL L TO P-CCNC: 274 U/L — HIGH (ref 50–242)
MAGNESIUM SERPL-MCNC: 1.6 MG/DL — SIGNIFICANT CHANGE UP (ref 1.6–2.6)
MCHC RBC-ENTMCNC: 32 PG — SIGNIFICANT CHANGE UP (ref 27–34)
MCHC RBC-ENTMCNC: 33.5 GM/DL — SIGNIFICANT CHANGE UP (ref 32–36)
MCV RBC AUTO: 95.5 FL — SIGNIFICANT CHANGE UP (ref 80–100)
MRSA PCR RESULT.: SIGNIFICANT CHANGE UP
NRBC # BLD: 2 /100 WBCS — HIGH (ref 0–0)
PHOSPHATE SERPL-MCNC: 3.5 MG/DL — SIGNIFICANT CHANGE UP (ref 2.5–4.5)
PLATELET # BLD AUTO: 147 K/UL — LOW (ref 150–400)
POTASSIUM SERPL-MCNC: 4.6 MMOL/L — SIGNIFICANT CHANGE UP (ref 3.5–5.3)
POTASSIUM SERPL-SCNC: 4.6 MMOL/L — SIGNIFICANT CHANGE UP (ref 3.5–5.3)
RBC # BLD: 2.66 M/UL — LOW (ref 4.2–5.8)
RBC # FLD: 14.9 % — HIGH (ref 10.3–14.5)
S AUREUS DNA NOSE QL NAA+PROBE: SIGNIFICANT CHANGE UP
SODIUM SERPL-SCNC: 135 MMOL/L — SIGNIFICANT CHANGE UP (ref 135–145)
WBC # BLD: 3.37 K/UL — LOW (ref 3.8–10.5)
WBC # FLD AUTO: 3.37 K/UL — LOW (ref 3.8–10.5)

## 2023-01-01 PROCEDURE — 87086 URINE CULTURE/COLONY COUNT: CPT

## 2023-01-01 PROCEDURE — 84132 ASSAY OF SERUM POTASSIUM: CPT

## 2023-01-01 PROCEDURE — 99239 HOSP IP/OBS DSCHRG MGMT >30: CPT | Mod: GC

## 2023-01-01 PROCEDURE — 84295 ASSAY OF SERUM SODIUM: CPT

## 2023-01-01 PROCEDURE — 85610 PROTHROMBIN TIME: CPT

## 2023-01-01 PROCEDURE — 70450 CT HEAD/BRAIN W/O DYE: CPT | Mod: MA

## 2023-01-01 PROCEDURE — 84484 ASSAY OF TROPONIN QUANT: CPT

## 2023-01-01 PROCEDURE — 84100 ASSAY OF PHOSPHORUS: CPT

## 2023-01-01 PROCEDURE — 97161 PT EVAL LOW COMPLEX 20 MIN: CPT

## 2023-01-01 PROCEDURE — 86341 ISLET CELL ANTIBODY: CPT

## 2023-01-01 PROCEDURE — 82330 ASSAY OF CALCIUM: CPT

## 2023-01-01 PROCEDURE — 96374 THER/PROPH/DIAG INJ IV PUSH: CPT

## 2023-01-01 PROCEDURE — 83735 ASSAY OF MAGNESIUM: CPT

## 2023-01-01 PROCEDURE — 82803 BLOOD GASES ANY COMBINATION: CPT

## 2023-01-01 PROCEDURE — 83615 LACTATE (LD) (LDH) ENZYME: CPT

## 2023-01-01 PROCEDURE — 93005 ELECTROCARDIOGRAM TRACING: CPT

## 2023-01-01 PROCEDURE — 83036 HEMOGLOBIN GLYCOSYLATED A1C: CPT

## 2023-01-01 PROCEDURE — 86803 HEPATITIS C AB TEST: CPT

## 2023-01-01 PROCEDURE — 0225U NFCT DS DNA&RNA 21 SARSCOV2: CPT

## 2023-01-01 PROCEDURE — 82947 ASSAY GLUCOSE BLOOD QUANT: CPT

## 2023-01-01 PROCEDURE — 85014 HEMATOCRIT: CPT

## 2023-01-01 PROCEDURE — 72125 CT NECK SPINE W/O DYE: CPT | Mod: MA

## 2023-01-01 PROCEDURE — 85027 COMPLETE CBC AUTOMATED: CPT

## 2023-01-01 PROCEDURE — 82435 ASSAY OF BLOOD CHLORIDE: CPT

## 2023-01-01 PROCEDURE — 72170 X-RAY EXAM OF PELVIS: CPT

## 2023-01-01 PROCEDURE — 83690 ASSAY OF LIPASE: CPT

## 2023-01-01 PROCEDURE — 99285 EMERGENCY DEPT VISIT HI MDM: CPT

## 2023-01-01 PROCEDURE — 85730 THROMBOPLASTIN TIME PARTIAL: CPT

## 2023-01-01 PROCEDURE — 80053 COMPREHEN METABOLIC PANEL: CPT

## 2023-01-01 PROCEDURE — 82962 GLUCOSE BLOOD TEST: CPT

## 2023-01-01 PROCEDURE — 83605 ASSAY OF LACTIC ACID: CPT

## 2023-01-01 PROCEDURE — 99223 1ST HOSP IP/OBS HIGH 75: CPT

## 2023-01-01 PROCEDURE — 85025 COMPLETE CBC W/AUTO DIFF WBC: CPT

## 2023-01-01 PROCEDURE — 87040 BLOOD CULTURE FOR BACTERIA: CPT

## 2023-01-01 PROCEDURE — 87640 STAPH A DNA AMP PROBE: CPT

## 2023-01-01 PROCEDURE — 36415 COLL VENOUS BLD VENIPUNCTURE: CPT

## 2023-01-01 PROCEDURE — 82565 ASSAY OF CREATININE: CPT

## 2023-01-01 PROCEDURE — 71045 X-RAY EXAM CHEST 1 VIEW: CPT

## 2023-01-01 PROCEDURE — 85018 HEMOGLOBIN: CPT

## 2023-01-01 PROCEDURE — 80048 BASIC METABOLIC PNL TOTAL CA: CPT

## 2023-01-01 PROCEDURE — 81001 URINALYSIS AUTO W/SCOPE: CPT

## 2023-01-01 PROCEDURE — 87641 MR-STAPH DNA AMP PROBE: CPT

## 2023-01-01 RX ORDER — LINAGLIPTIN 5 MG/1
1 TABLET, FILM COATED ORAL
Qty: 30 | Refills: 0
Start: 2023-01-01 | End: 2023-01-30

## 2023-01-01 RX ORDER — TERAZOSIN HYDROCHLORIDE 10 MG/1
1 CAPSULE ORAL
Qty: 0 | Refills: 0 | DISCHARGE

## 2023-01-01 RX ORDER — METFORMIN HYDROCHLORIDE 850 MG/1
1 TABLET ORAL
Qty: 60 | Refills: 0
Start: 2023-01-01 | End: 2023-01-30

## 2023-01-01 RX ORDER — RAMIPRIL 5 MG
1 CAPSULE ORAL
Qty: 0 | Refills: 0 | DISCHARGE

## 2023-01-01 RX ORDER — INSULIN GLARGINE 100 [IU]/ML
20 INJECTION, SOLUTION SUBCUTANEOUS AT BEDTIME
Refills: 0 | Status: DISCONTINUED | OUTPATIENT
Start: 2023-01-01 | End: 2023-01-01

## 2023-01-01 RX ORDER — INSULIN LISPRO 100/ML
7 VIAL (ML) SUBCUTANEOUS
Refills: 0 | Status: DISCONTINUED | OUTPATIENT
Start: 2023-01-01 | End: 2023-01-01

## 2023-01-01 RX ORDER — METFORMIN HYDROCHLORIDE 850 MG/1
1 TABLET ORAL
Qty: 0 | Refills: 0 | DISCHARGE

## 2023-01-01 RX ORDER — METOPROLOL TARTRATE 50 MG
1 TABLET ORAL
Qty: 0 | Refills: 0 | DISCHARGE

## 2023-01-01 RX ORDER — AMLODIPINE BESYLATE 2.5 MG/1
1 TABLET ORAL
Qty: 0 | Refills: 0 | DISCHARGE

## 2023-01-01 RX ORDER — MEGESTROL ACETATE 40 MG/ML
20 SUSPENSION ORAL
Qty: 0 | Refills: 0 | DISCHARGE
Start: 2023-01-01

## 2023-01-01 RX ORDER — MEGESTROL ACETATE 40 MG/ML
20 SUSPENSION ORAL
Qty: 0 | Refills: 0 | DISCHARGE

## 2023-01-01 RX ADMIN — Medication 2: at 09:35

## 2023-01-01 RX ADMIN — Medication 1: at 17:21

## 2023-01-01 RX ADMIN — Medication 1 MILLIGRAM(S): at 12:54

## 2023-01-01 RX ADMIN — Medication 7 UNIT(S): at 17:21

## 2023-01-01 RX ADMIN — ENOXAPARIN SODIUM 40 MILLIGRAM(S): 100 INJECTION SUBCUTANEOUS at 12:54

## 2023-01-01 RX ADMIN — Medication 5 UNIT(S): at 09:35

## 2023-01-01 RX ADMIN — Medication 3: at 13:47

## 2023-01-01 RX ADMIN — Medication 7 UNIT(S): at 14:12

## 2023-01-01 RX ADMIN — Medication 1 TABLET(S): at 12:54

## 2023-01-01 RX ADMIN — MEGESTROL ACETATE 800 MILLIGRAM(S): 40 SUSPENSION ORAL at 12:54

## 2023-01-01 RX ADMIN — PANTOPRAZOLE SODIUM 40 MILLIGRAM(S): 20 TABLET, DELAYED RELEASE ORAL at 05:44

## 2023-01-01 RX ADMIN — CHLORHEXIDINE GLUCONATE 1 APPLICATION(S): 213 SOLUTION TOPICAL at 12:54

## 2023-01-01 NOTE — PROGRESS NOTE ADULT - PROBLEM SELECTOR PLAN 2
-hold home metformin and glipizide  -A1c elevated at 12, similar to prior earlier this month, endo c/s appreciated  -c/w home atorvastatin.

## 2023-01-01 NOTE — CONSULT NOTE ADULT - TIME BILLING
case complexity and discharge planning. Pt is clinically stable for discharge home to f/up with PCP, Heme/onc, Endocrinology in 1-2 weeks.

## 2023-01-01 NOTE — PROGRESS NOTE ADULT - NUTRITIONAL ASSESSMENT
This patient has been assessed with a concern for Malnutrition and has been determined to have a diagnosis/diagnoses of Severe protein-calorie malnutrition.    This patient is being managed with:   Diet Consistent Carbohydrate/No Snacks-  Supplement Feeding Modality:  Oral  Glucerna Shake Cans or Servings Per Day:  3       Frequency:  Daily  Entered: Dec 30 2022  4:55PM    
This patient has been assessed with a concern for Malnutrition and has been determined to have a diagnosis/diagnoses of Severe protein-calorie malnutrition.    This patient is being managed with:   Diet Consistent Carbohydrate/No Snacks-  Supplement Feeding Modality:  Oral  Glucerna Shake Cans or Servings Per Day:  3       Frequency:  Daily  Entered: Dec 30 2022  4:55PM

## 2023-01-01 NOTE — PROGRESS NOTE ADULT - PROBLEM SELECTOR PLAN 4
-reviewed outpatient records in [facility]  -pt completed FOLFOX adjuvant chemo this month and was next planned for possible surgical resection (had upcoming surgical appt for this month)  -also completed radiation therapy.
-reviewed outpatient records in [facility]  -pt completed FOLFOX adjuvant chemo this month and was next planned for possible surgical resection (had upcoming surgical appt for this month)  -also completed radiation therapy.
-reviewed outpatient records in [facility]  -pt completed FOLFOX adjuvant chemo this month and was next planned for possible surgical resection (had upcoming surgical appt for this month)  -also completed radiation therapy.

## 2023-01-01 NOTE — PROGRESS NOTE ADULT - SUBJECTIVE AND OBJECTIVE BOX
Internal Medicine   Rian Gomez | PGY-2    OVERNIGHT EVENTS:      SUBJECTIVE:       MEDICATIONS  (STANDING):  atorvastatin 20 milliGRAM(s) Oral at bedtime  chlorhexidine 4% Liquid 1 Application(s) Topical daily  dextrose 5%. 1000 milliLiter(s) (100 mL/Hr) IV Continuous <Continuous>  dextrose 5%. 1000 milliLiter(s) (50 mL/Hr) IV Continuous <Continuous>  dextrose 50% Injectable 25 Gram(s) IV Push once  dextrose 50% Injectable 12.5 Gram(s) IV Push once  dextrose 50% Injectable 25 Gram(s) IV Push once  enoxaparin Injectable 40 milliGRAM(s) SubCutaneous every 24 hours  folic acid 1 milliGRAM(s) Oral daily  glucagon  Injectable 1 milliGRAM(s) IntraMuscular once  insulin glargine Injectable (LANTUS) 15 Unit(s) SubCutaneous at bedtime  insulin lispro (ADMELOG) corrective regimen sliding scale   SubCutaneous three times a day before meals  insulin lispro (ADMELOG) corrective regimen sliding scale   SubCutaneous at bedtime  insulin lispro Injectable (ADMELOG) 5 Unit(s) SubCutaneous three times a day before meals  megestrol Suspension 800 milliGRAM(s) Oral daily  multivitamin 1 Tablet(s) Oral daily  pantoprazole    Tablet 40 milliGRAM(s) Oral before breakfast    MEDICATIONS  (PRN):  acetaminophen     Tablet .. 650 milliGRAM(s) Oral every 6 hours PRN Temp greater or equal to 38C (100.4F), Mild Pain (1 - 3)  dextrose Oral Gel 15 Gram(s) Oral once PRN Blood Glucose LESS THAN 70 milliGRAM(s)/deciliter        T(F): 98.1 (01-01-23 @ 05:34), Max: 98.9 (12-31-22 @ 14:22)  HR: 94 (01-01-23 @ 05:34) (94 - 98)  BP: 102/70 (01-01-23 @ 05:34) (102/70 - 118/68)  BP(mean): --  RR: 18 (01-01-23 @ 05:34) (18 - 18)  SpO2: 100% (01-01-23 @ 05:34) (100% - 100%)    PHYSICAL EXAM:     GENERAL: NAD, lying in bed comfortably  HEAD:  Atraumatic, Normocephalic  EYES: EOMI, PERRLA, conjunctiva and sclera clear, no nystagmus noted  ENT: Moist mucous membranes,   NECK: Supple, No JVD, trachea midline  CHEST/LUNG: Clear to auscultation bilaterally; No rales, rhonchi, wheezing, or rubs. Unlabored respirations  HEART: Regular rate and rhythm; No murmurs, rubs, or gallops, normal S1/S2  ABDOMEN: normal bowel sounds; Soft, nontender, nondistended, no organomegaly   EXTREMITIES:  2+ Peripheral Pulses, brisk capillary refill. No clubbing, cyanosis, or edema  MSK: No gross deformities noted   Neurological:  A&Ox3, no focal deficits   SKIN: No rashes or lesions  PSYCH: Normal mood, affect     TELEMETRY:    LABS:                        8.5    3.84  )-----------( 141      ( 31 Dec 2022 06:52 )             25.4     12-31    133<L>  |  101  |  28<H>  ----------------------------<  326<H>  4.7   |  16<L>  |  1.09    Ca    9.7      31 Dec 2022 06:52  Phos  3.4     12-31  Mg     1.6     12-31    TPro  6.3  /  Alb  3.9  /  TBili  0.3  /  DBili  x   /  AST  28  /  ALT  37  /  AlkPhos  59  12-30            Creatinine Trend: 1.09<--, 1.15<--, 1.11<--, 1.38<--  I&O's Summary    31 Dec 2022 07:01  -  01 Jan 2023 07:00  --------------------------------------------------------  IN: 675 mL / OUT: 2335 mL / NET: -1660 mL      BNP    RADIOLOGY & ADDITIONAL STUDIES:             Internal Medicine   Rian Gomez | PGY-2    OVERNIGHT EVENTS: No overnight events      SUBJECTIVE: Patient denies any complaints. No fever, chills, dyspnea, chest pain, palpitations, ab pain, diarrhea, urinary symptoms      MEDICATIONS  (STANDING):  atorvastatin 20 milliGRAM(s) Oral at bedtime  chlorhexidine 4% Liquid 1 Application(s) Topical daily  dextrose 5%. 1000 milliLiter(s) (100 mL/Hr) IV Continuous <Continuous>  dextrose 5%. 1000 milliLiter(s) (50 mL/Hr) IV Continuous <Continuous>  dextrose 50% Injectable 25 Gram(s) IV Push once  dextrose 50% Injectable 12.5 Gram(s) IV Push once  dextrose 50% Injectable 25 Gram(s) IV Push once  enoxaparin Injectable 40 milliGRAM(s) SubCutaneous every 24 hours  folic acid 1 milliGRAM(s) Oral daily  glucagon  Injectable 1 milliGRAM(s) IntraMuscular once  insulin glargine Injectable (LANTUS) 15 Unit(s) SubCutaneous at bedtime  insulin lispro (ADMELOG) corrective regimen sliding scale   SubCutaneous three times a day before meals  insulin lispro (ADMELOG) corrective regimen sliding scale   SubCutaneous at bedtime  insulin lispro Injectable (ADMELOG) 5 Unit(s) SubCutaneous three times a day before meals  megestrol Suspension 800 milliGRAM(s) Oral daily  multivitamin 1 Tablet(s) Oral daily  pantoprazole    Tablet 40 milliGRAM(s) Oral before breakfast    MEDICATIONS  (PRN):  acetaminophen     Tablet .. 650 milliGRAM(s) Oral every 6 hours PRN Temp greater or equal to 38C (100.4F), Mild Pain (1 - 3)  dextrose Oral Gel 15 Gram(s) Oral once PRN Blood Glucose LESS THAN 70 milliGRAM(s)/deciliter        T(F): 98.1 (01-01-23 @ 05:34), Max: 98.9 (12-31-22 @ 14:22)  HR: 94 (01-01-23 @ 05:34) (94 - 98)  BP: 102/70 (01-01-23 @ 05:34) (102/70 - 118/68)  BP(mean): --  RR: 18 (01-01-23 @ 05:34) (18 - 18)  SpO2: 100% (01-01-23 @ 05:34) (100% - 100%)    PHYSICAL EXAM:     GENERAL: Thin, clinically well-appearing and comfortable  HEAD:  Atraumatic, Normocephalic  EYES: EOMI, PERRLA, conjunctiva and sclera clear  NECK: Supple, No JVD  CHEST/LUNG: Clear to auscultation bilaterally; No wheeze  HEART: Regular rate and rhythm; No murmurs, rubs, or gallops  ABDOMEN: Soft, Nontender, Nondistended; Bowel sounds present  EXTREMITIES:  2+ Peripheral Pulses, No clubbing, cyanosis, or edema  PSYCH: Normal mood, Normal affect  NEUROLOGY: non-focal  SKIN: No rashes or lesions    TELEMETRY:    LABS:                        8.5    3.84  )-----------( 141      ( 31 Dec 2022 06:52 )             25.4     12-31    133<L>  |  101  |  28<H>  ----------------------------<  326<H>  4.7   |  16<L>  |  1.09    Ca    9.7      31 Dec 2022 06:52  Phos  3.4     12-31  Mg     1.6     12-31    TPro  6.3  /  Alb  3.9  /  TBili  0.3  /  DBili  x   /  AST  28  /  ALT  37  /  AlkPhos  59  12-30            Creatinine Trend: 1.09<--, 1.15<--, 1.11<--, 1.38<--  I&O's Summary    31 Dec 2022 07:01  -  01 Jan 2023 07:00  --------------------------------------------------------  IN: 675 mL / OUT: 2335 mL / NET: -1660 mL      BNP    RADIOLOGY & ADDITIONAL STUDIES:

## 2023-01-01 NOTE — PROGRESS NOTE ADULT - PROBLEM/PLAN-4
PAST SURGICAL HISTORY:  No significant past surgical history     
DISPLAY PLAN FREE TEXT

## 2023-01-01 NOTE — CONSULT NOTE ADULT - ASSESSMENT
This is a 76 yo M /w a PMH of rectal cancer on chemo/radiation, HTN and poorly controlled DM2 who presents with decreased appetite and progressive weakness. A1c checked and is 12%. Endocrine consulted for further diabetes management    #DM2 - poorly controlled  -increase lantus to 20 units nightly  -increase admelog to 7 units with meals  -low correction admelog scale before meals and before bedtime  -FSG before meals and bedtime  -carb consistent diet  -FSG goal 100-180    #D/c recs  -goal HbA1c <8%  -c/w metformin 1000mg BID  -patient would benefit from starting basal insulin at 20 units nightly. IF patient changes his mind and agrees to insulin, then please make sure nurse teaches him or family member who can administer insulin for him  -FSG should be checked before breakfast and before bedtime  -if not starting insulin, c/w glipizide (home dose) and start tradjenta 5mg  daily  -if patient wants he can follow up at [facility]ocrine Practice at [address] [place] [address], [address], [place] [address]; Ph # [phone] - please ensure he has this number, otherwise follow up with PCP    #HTN  -c/w home meds    #HLd  -c/w atorvastatin 20mg daily    Case discussed with Dr. aMyito Pang MD  Endocrine Fellow  Can be reached via teams. For follow up questions, discharge recommendations, or new consults, please call answering service at [phone] (weekdays); [phone] (nights/weekends) This is a 76 yo M /w a PMH of rectal cancer on chemo/radiation, HTN and poorly controlled DM2 who presents with decreased appetite and progressive weakness. A1c checked and is 12%. Endocrine consulted for further diabetes management    #DM2 - poorly controlled  -increase lantus to 20 units nightly  -increase admelog to 7 units with meals  -low correction admelog scale before meals and before bedtime  -FSG before meals and bedtime  -carb consistent diet  -FSG goal 100-180    #D/c recs  -goal HbA1c <8%  -c/w metformin 1000mg BID  -patient would benefit from starting basal insulin at 20 units nightly. IF patient changes his mind and agrees to insulin, then please make sure nurse teaches him or family member who can administer insulin for him  -FSG should be checked before breakfast and before bedtime  -if not starting insulin, c/w glipizide and metformin (home dose) and start tradjenta 5mg  daily  -if patient wants he can follow up at Endocrine Practice at [address] [place] [address], [address], [place] [address]; Ph # [phone] - please ensure he has this number, otherwise follow up with PCP    #HTN  -c/w home meds    #HLd  -c/w atorvastatin 20mg daily    Case discussed with Dr. Mayito Pang MD  Endocrine Fellow  Can be reached via teams. For follow up questions, discharge recommendations, or new consults, please call answering service at [phone] (weekdays); [phone] (nights/weekends)

## 2023-01-01 NOTE — DISCHARGE NOTE NURSING/CASE MANAGEMENT/SOCIAL WORK - NSDCPEFALRISK_GEN_ALL_CORE
For information on Fall & Injury Prevention, visit: [email or web address] OR  [email or web address] OR  https://www.cdc.gov/steadi/patient.html

## 2023-01-01 NOTE — PROGRESS NOTE ADULT - PROBLEM SELECTOR PLAN 3
dvt ppx: lovenox  diet: regular  ambulate: with assistance  gi ppx: home ppi    fall precautions  aspiration precautions    PT rec Home PT

## 2023-01-01 NOTE — CONSULT NOTE ADULT - SUBJECTIVE AND OBJECTIVE BOX
HPI:  78yo M w/ PMHx of rectal ca (on chemo/radiation), HTN, HLD, DM2, presents with hypotension, history obtained from granddaughter Halley via telephone, she reports that since initiating cancer treatment, patient has been weak and with poor appetitie, he was prescribed megestrol to help but did not help, he has gotten progressively weaker, to the point where he can no longer walk on his own and had multiple falls, and has lost 50lbs due to poor PO intake, she measured his blood pressure at home today and it was low so she brought him to the ED for further management, in the ED, pt was hypotensive and tachycardic but fluid responsive, labs were notable for elevated lactic on VBG, imaging obtained did not reveal any acute findings, pt was given 1L NS, 1L LR, started on LR gtt, Mg sulfate, admitted to general medicine for further management  (30 Dec 2022 07:02)      Consulted for: DM2 management  This is a 76 yo M /w a PMH of rectal cancer on chemo/radiation, HTN and poorly controlled DM2 who presents with decreased appetite and progressive weakness. A1c checked and is 12%. Patient reports he thinks this is because he has been given mashed potatoes. Reports FSG at home are 120 before breakfast and 140 before dinner. FSG much higher here. Denies retinopathy, nephropathy, neuropathy.     Patient reports he takes metformin 1000mg BID and another medication. Follows with PCP.     When discussing strategies to monitor post-prandial hyperglycemia patient puts up his hand and turns his head away stating he doesnt want to talk further    Patient had expressed to endocrine attending Dr. Edmond that he would not start insulin.    Unable to engage patient further on how to optimize diabetes control    Started yesterday on basal-bolus 15 units lantus and 5 units pre-meal with some improvement in glycemic control. Patient reports good appetite    PAST MEDICAL & SURGICAL HISTORY:  DM (diabetes mellitus)      HLD (hyperlipidemia)      Colon cancer      No significant past surgical history          FAMILY HISTORY:  No pertinent family history in first degree relatives        Social History: unable to obtain    Home Medications:  amLODIPine 5 mg oral tablet: 1 tab(s) orally once a day (30 Dec 2022 06:55)  atorvastatin 20 mg oral tablet: 1 tab(s) orally once a day (30 Dec 2022 06:55)  folic acid 1 mg oral tablet: 1 tab(s) orally once a day (30 Dec 2022 06:55)  glipiZIDE 10 mg oral tablet: 1 tab(s) orally once a day (30 Dec 2022 06:55)  megestrol 40 mg/mL oral suspension: 20 milliliter(s) orally once a day (30 Dec 2022 06:55)  metFORMIN 500 mg oral tablet: 1 tab(s) orally 2 times a day (30 Dec 2022 06:55)  metoprolol succinate 25 mg oral tablet, extended release: 1 tab(s) orally once a day (30 Dec 2022 06:55)  pantoprazole 40 mg oral delayed release tablet: 1 tab(s) orally once a day (30 Dec 2022 06:55)  ramipril 10 mg oral capsule: 1 cap(s) orally once a day (30 Dec 2022 06:55)  terazosin 5 mg oral capsule: 1 cap(s) orally once a day (at bedtime) (30 Dec 2022 06:55)      MEDICATIONS  (STANDING):  atorvastatin 20 milliGRAM(s) Oral at bedtime  chlorhexidine 4% Liquid 1 Application(s) Topical daily  dextrose 5%. 1000 milliLiter(s) (100 mL/Hr) IV Continuous <Continuous>  dextrose 5%. 1000 milliLiter(s) (50 mL/Hr) IV Continuous <Continuous>  dextrose 50% Injectable 25 Gram(s) IV Push once  dextrose 50% Injectable 12.5 Gram(s) IV Push once  dextrose 50% Injectable 25 Gram(s) IV Push once  enoxaparin Injectable 40 milliGRAM(s) SubCutaneous every 24 hours  folic acid 1 milliGRAM(s) Oral daily  glucagon  Injectable 1 milliGRAM(s) IntraMuscular once  insulin glargine Injectable (LANTUS) 15 Unit(s) SubCutaneous at bedtime  insulin lispro (ADMELOG) corrective regimen sliding scale   SubCutaneous three times a day before meals  insulin lispro (ADMELOG) corrective regimen sliding scale   SubCutaneous at bedtime  insulin lispro Injectable (ADMELOG) 5 Unit(s) SubCutaneous three times a day before meals  megestrol Suspension 800 milliGRAM(s) Oral daily  multivitamin 1 Tablet(s) Oral daily  pantoprazole    Tablet 40 milliGRAM(s) Oral before breakfast    MEDICATIONS  (PRN):  acetaminophen     Tablet .. 650 milliGRAM(s) Oral every 6 hours PRN Temp greater or equal to 38C (100.4F), Mild Pain (1 - 3)  dextrose Oral Gel 15 Gram(s) Oral once PRN Blood Glucose LESS THAN 70 milliGRAM(s)/deciliter      Allergies    No Known Allergies    Intolerances      Review of Systems:  Constitutional: No fever  Eyes: No blurry vision  Neuro: No tremors  HEENT: No pain  Cardiovascular: No chest pain, palpitations  Respiratory: No SOB, no cough  GI: No nausea, vomiting, abdominal pain  : No dysuria  Skin: no rash  Psych: no depression  Endocrine: no polyuria, polydipsia  Hem/lymph: no swelling  Osteoporosis: no fractures    PHYSICAL EXAM:  VITALS: T(C): 36.7 (01-01-23 @ 05:34)  T(F): 98.1 (01-01-23 @ 05:34), Max: 98.9 (12-31-22 @ 14:22)  HR: 94 (01-01-23 @ 05:34) (94 - 98)  BP: 102/70 (01-01-23 @ 05:34) (102/70 - 118/68)  RR:  (18 - 18)  SpO2:  (100% - 100%)  Wt(kg): --  GENERAL: NAD  EYES: No proptosis, no lid lag, anicteric  THYROID: Normal size, no palpable nodules  RESPIRATORY: Clear to auscultation bilaterally  CARDIOVASCULAR: Regular rate and rhythm  GI: Soft, nontender, non distended  EXT: b/l feet without wounds; 2+ pulses  PSYCH: Alert and oriented x 3, reactive mood    POCT Blood Glucose.: 233 mg/dL (01-01-23 @ 08:52)  POCT Blood Glucose.: 387 mg/dL (12-31-22 @ 22:10)  POCT Blood Glucose.: 317 mg/dL (12-31-22 @ 16:59)  POCT Blood Glucose.: 343 mg/dL (12-31-22 @ 12:51)  POCT Blood Glucose.: 258 mg/dL (12-31-22 @ 08:28)  POCT Blood Glucose.: 322 mg/dL (12-31-22 @ 01:23)  POCT Blood Glucose.: 271 mg/dL (12-30-22 @ 21:47)  POCT Blood Glucose.: 282 mg/dL (12-30-22 @ 17:20)  POCT Blood Glucose.: 205 mg/dL (12-30-22 @ 12:35)  POCT Blood Glucose.: 229 mg/dL (12-29-22 @ 22:12)                            8.5    3.37  )-----------( 147      ( 01 Jan 2023 07:07 )             25.4       01-01    135  |  103  |  27<H>  ----------------------------<  244<H>  4.6   |  17<L>  |  1.00    eGFR: 78    Ca    9.6      01-01  Mg     1.6     01-01  Phos  3.5     01-01    TPro  6.3  /  Alb  3.9  /  TBili  0.3  /  DBili  x   /  AST  28  /  ALT  37  /  AlkPhos  59  12-30      Thyroid Function Tests:      A1C with Estimated Average Glucose Result: 11.9 % (01-01-23 @ 07:07)  A1C with Estimated Average Glucose Result: 12.0 % (12-31-22 @ 06:52)          Radiology:

## 2023-01-01 NOTE — PROGRESS NOTE ADULT - ASSESSMENT
76yo M w/ PMHx of Colon Ca (on active chemo/radiation), HTN, HLD, DM2, presents with failure to thrive.

## 2023-01-01 NOTE — CONSULT NOTE ADULT - ATTENDING COMMENTS
Pt seen and examined. No acute events overnight. He denies cp, sob, dizziness. Reports feeling well overall.  On exam, AAOx 3, +tachycardic,  ctab, s1s2, no mrg. Abd is soft, nt, nd, bs+. No LE edema.  Labs reviewed. Lactate improved s/p IV fluids. Hb A1C 12.  Hyperglycemia is better on basal/bolus insulin, ARMANDO  Seen by Endocrinology ; reccs d/c on insulin in addition to PO meds ; pt refusing insulin  Will d/c on Metformin 1000mg BID,  glipizide  and start tradjenta 5mg  daily  Pt is clinically stable for discharge. 78 y/o male with very poorly controlled diabetes A1c 12% admitted with failure to thrive. Taking PO agents at home, hesitant about starting insulin or injectable medications. Discussed in detail about complications from uncontrolled hyperglycemia.   High-risk patient with poorly controlled diabetes placing him at risk of microvascular and macrovascular complications such as CAD, CVA, high level decision making.   Would recommend basal insulin + PO agents for discharge. However, if patient is unwilling - would add additional PO agents (though it may not be enough for glycemic control). While inpatient, adjust basal/bolus insulin per Dr. Pang's note.     Mike Edmond MD  Attending Physician   Division of Endocrinology, Diabetes and Metabolism

## 2023-01-01 NOTE — DISCHARGE NOTE NURSING/CASE MANAGEMENT/SOCIAL WORK - PATIENT PORTAL LINK FT
You can access the FollowMyHealth Patient Portal offered by [facility] by registering at the following website: [email or web address]. By joining [facility]’s FollowMyHealth portal, you will also be able to view your health information using other applications (apps) compatible with our system.

## 2023-01-01 NOTE — PROGRESS NOTE ADULT - PROBLEM SELECTOR PLAN 1
-in setting of poor PO intake, hypotension and electrolyte abnormalities  -s/p 2L fluid in ED now on maintenance LR 100ml/hr  -trend lactate and electrolytes   -encourage PO intake  -c/w home megestrol (granddaughter reports possible non-compliance)  -hold all home blood pressure medications.

## 2023-01-04 ENCOUNTER — APPOINTMENT (OUTPATIENT)
Dept: SURGICAL ONCOLOGY | Facility: CLINIC | Age: 78
End: 2023-01-04
Payer: MEDICARE

## 2023-01-04 VITALS
BODY MASS INDEX: 22.98 KG/M2 | OXYGEN SATURATION: 99 % | RESPIRATION RATE: 18 BRPM | HEART RATE: 120 BPM | TEMPERATURE: 96.7 F | SYSTOLIC BLOOD PRESSURE: 109 MMHG | DIASTOLIC BLOOD PRESSURE: 64 MMHG | WEIGHT: 143 LBS | HEIGHT: 66 IN

## 2023-01-04 LAB
CULTURE RESULTS: SIGNIFICANT CHANGE UP
CULTURE RESULTS: SIGNIFICANT CHANGE UP
SPECIMEN SOURCE: SIGNIFICANT CHANGE UP
SPECIMEN SOURCE: SIGNIFICANT CHANGE UP

## 2023-01-04 PROCEDURE — 99214 OFFICE O/P EST MOD 30 MIN: CPT

## 2023-01-06 LAB — GAD65 AB SER-MCNC: 0.02 NMOL/L — SIGNIFICANT CHANGE UP

## 2023-01-06 NOTE — CONSULT LETTER
[Dear  ___] : Dear  [unfilled], [Consult Letter:] : I had the pleasure of evaluating your patient, [unfilled]. [( Thank you for referring [unfilled] for consultation for _____ )] : Thank you for referring [unfilled] for consultation for [unfilled] [Please see my note below.] : Please see my note below. [Consult Closing:] : Thank you very much for allowing me to participate in the care of this patient.  If you have any questions, please do not hesitate to contact me. [Sincerely,] : Sincerely, [DrMichel  ___] : Dr. FORRESTER [DrMichel ___] : Dr. FORRESTER [FreeTextEntry2] : Trey Pressley MD [FreeTextEntry3] : Pernell Barcenas MD, FICS, FACS\par Director of Surgical Oncology- [facility] \par , Department of Surgery  \par [employer] [facility] [employer] [facility] \par [address]\[place] [address]\par \par [address]\[place] [address]\par \par [address]\[place] [address]\par \par (mob) [phone]\par (o) [phone]\par (f) [phone]\par \par

## 2023-01-06 NOTE — HISTORY OF PRESENT ILLNESS
[de-identified] : Mr. REBA WHITLOCK is a 77 year old male who present today for follow up visit for rectal cancer. Referred by Dr. Pressley \par PMH: HTN, HLD, DM, prostate cancer \par Family History: breast cancer in sister \par \par  4/13/22: Denies abdominal pain, bloating, nausea/vomiting, bowel habit changes, hematochezia, black sticky stools, fever, chills, night sweats or weight loss. \par \par Endoscopy and Colonoscopy 3/23/22: Rectal mass pending final pathology \par \par EUS 4/26/22: T3 N0 \par  \par MRI pelvis 4/26/22: 3.0 cm right anterior mid to lower rectal tumor located 5.5 cm from the anal verge and 1.2 cm from the top anal sphincter. 6 mm extension beyond the muscularis propria along the right anterior wall reaching the circumferential resection margin. Stage T3 N0. CRM: involved. Sphincter: absent \par \par CT chest/abdomen/pelvis performed on 5/12/22 showed no evidence of metastatic disease.\par \par Patient presented at rectal tumor board.  He began neoadjuvant radiation on 6/1/22 under the care of Dr. Vieira, completed treatment 6/7/2022. Plans to have port placed & start FOLFOX with Dr. Daniel. \par Patient is on total neoadjuvant therapy regimen with short course RT followed by 12-16 weeks of FOLFOX, will restage after \par \par Patient present today 1/4/23 states feeling very weak, after chemo therapy, he was admitted to [facility] due to dehydration. He has  completed radiation and chemo therapy. \par

## 2023-01-06 NOTE — PHYSICAL EXAM
[Normal] : supple, no neck mass and thyroid not enlarged [Normal Neck Lymph Nodes] : normal neck lymph nodes  [Normal Supraclavicular Lymph Nodes] : normal supraclavicular lymph nodes [Normal Groin Lymph Nodes] : normal groin lymph nodes [Normal Axillary Lymph Nodes] : normal axillary lymph nodes [Normal] : oriented to person, place and time, with appropriate affect [de-identified] : Abdomen soft, non-tender, non-distended, and no palpable masses.  [FreeTextEntry1] : rectal wall full of stool, mass not palpable

## 2023-01-06 NOTE — ASSESSMENT
[FreeTextEntry1] : IMP: 77 year old male original presented with rectal mass suspicious for a malignancy\par EUS: T3N0\par MRI pelvis 4/26/22: 3.0 cm right anterior mid to lower rectal tumor located 5.5 cm from the anal verge and 1.2 cm from the top anal sphincter. 6 mm extension beyond the muscularis propria along the right anterior wall reaching the circumferential resection margin. \par Stage T3c N0. CRM: involved. Sphincter involvement: absent \par \par Staging CT C/A/P- no evidence of metastatic disease\par \par S/p neoadjuvant RT concluded ~6/6/22- short course radiation \par \par Ildefonso is planned to have a port placed on 7/11/2022.  \par Patient completed neoadjuvant therapy regimen with short course RT followed by 12-16 weeks of FOLFOX.\par \par PLAN: \par Restaging imaging CT C/A/P & MR mass protocol now; completed chemotherapy with Dr. Daniel and radiation .\par Patient is in need of pre habitation prior to surgery referred to Dr. He for PT. He is current;y ECOG 1-2 \par RTO 2 weeks \par \par I have discussed the diagnosis, therapeutic plan and options with the patient at length. Patient expressed verbal understanding to proceed with proposed plan. All questions answered. \par

## 2023-01-08 LAB — ZINC TRANSPORTER 8 AB, RESULT: <15 U/ML — SIGNIFICANT CHANGE UP

## 2023-01-11 ENCOUNTER — NON-APPOINTMENT (OUTPATIENT)
Age: 78
End: 2023-01-11

## 2023-01-11 ENCOUNTER — LABORATORY RESULT (OUTPATIENT)
Age: 78
End: 2023-01-11

## 2023-01-11 ENCOUNTER — APPOINTMENT (OUTPATIENT)
Dept: CARDIOLOGY | Facility: CLINIC | Age: 78
End: 2023-01-11
Payer: MEDICARE

## 2023-01-11 VITALS
HEART RATE: 93 BPM | OXYGEN SATURATION: 99 % | TEMPERATURE: 97.8 F | DIASTOLIC BLOOD PRESSURE: 60 MMHG | WEIGHT: 144 LBS | BODY MASS INDEX: 23.14 KG/M2 | HEIGHT: 66 IN | SYSTOLIC BLOOD PRESSURE: 120 MMHG

## 2023-01-11 DIAGNOSIS — I45.10 UNSPECIFIED RIGHT BUNDLE-BRANCH BLOCK: ICD-10-CM

## 2023-01-11 PROCEDURE — 99204 OFFICE O/P NEW MOD 45 MIN: CPT

## 2023-01-11 PROCEDURE — 93000 ELECTROCARDIOGRAM COMPLETE: CPT

## 2023-01-11 RX ORDER — CYANOCOBALAMIN (VITAMIN B-12) 1000 MCG
100 TABLET, EXTENDED RELEASE ORAL DAILY
Qty: 30 | Refills: 0 | Status: ACTIVE | COMMUNITY
Start: 2023-01-11

## 2023-01-11 NOTE — HISTORY OF PRESENT ILLNESS
[FreeTextEntry1] : 78 yo male with HTN, HLD, DM, Prostate CA and rectal CA (followed by Dr. Barcenas/ Dr. Vieira) presents today for cardiac evaluation.\par \par The patient presents for evaluation of high blood pressure. Patient is currently tolerating the current antihypertensive regime and they deny headaches, stiff neck, visual changes, pedal Edema or PND. They also are here for follow-up of elevated cholesterol and continued care of diabetes mellitus. Patient is currently tolerating medication and denies muscle pain, joint pain, back pain, tea, nausea, vomiting, abdominal pain or diarrhea. The patient is trying to follow a low cholesterol diet.  The patient is following the diabetic regimen and is tolerating hypoglycemic agents and following the diet.\par \par Pt recently hospitalized at [facility] for failure to thrive 12/30/22.\par \par Hospital Course	 \par 76yo M w/ PMHx of rectal ca (on chemo/radiation), HTN, HLD, DM2, presents with hypotension, history obtained from granddaughter Halley via telephone, she reports that since initiating cancer treatment, patient has been weak and with poor appetite, he was prescribed megestrol to help but did not help, he has gotten progressively weaker, to the point where he can no longer walk on his own and had multiple falls, and has lost 50lbs due to poor PO intake, she measured his blood pressure at home today and it was low so she brought him to the ED for further management, in the ED, pt was hypotensive and tachycardic but fluid responsive, labs were notable for elevated lactic acid on VBG, imaging obtained of the head, chest, and pelvis did not reveal any acute \par findings, pt was given 1L NS, 1L LR, started on LR gtt, Mg sulfate, admitted to general medicine for further management . Pt felt improved with gentle IVF. Pt improved in lactic acid and electrolytes after increasing PO intake, then became hyperglycemic with worsening lactic acid, continued on IVF hydration and basal-bolus insulin in consultation with endocrinology. Patient's lactate was while elevated, stayed consistent between 2-4 and patient showed no signs of shock. Patient was deemed to have lactate B1 in the setting of colorectal cancer. Endocrinology was consulted but patient did not want insulin per endo recs and was recommended an alternative PO regimen per endo. The decision was made to discharge patient in stable condition with PCP follow-up and Home PT. Pt to follow-up with surgical oncology as scheduled. Per daughter, patient also scheduled an appointment with his personal endocrinologist for further diabetic control soon after discharge. \par \par Pt's appetite has improved since hospitalization. On Booster/ Glucerna. \par Pt's PCP Dr. Melinda Pereira

## 2023-01-13 LAB
ALBUMIN SERPL ELPH-MCNC: 4.3 G/DL
ALP BLD-CCNC: 59 U/L
ALT SERPL-CCNC: 42 U/L
ANION GAP SERPL CALC-SCNC: 16 MMOL/L
AST SERPL-CCNC: 44 U/L
BASOPHILS # BLD AUTO: 0.02 K/UL
BASOPHILS NFR BLD AUTO: 0.4 %
BILIRUB SERPL-MCNC: 0.2 MG/DL
BUN SERPL-MCNC: 34 MG/DL
CALCIUM SERPL-MCNC: 10.5 MG/DL
CHLORIDE SERPL-SCNC: 99 MMOL/L
CHOLEST SERPL-MCNC: 205 MG/DL
CO2 SERPL-SCNC: 17 MMOL/L
CREAT SERPL-MCNC: 1.13 MG/DL
EGFR: 67 ML/MIN/1.73M2
EOSINOPHIL # BLD AUTO: 0.01 K/UL
EOSINOPHIL NFR BLD AUTO: 0.2 %
ESTIMATED AVERAGE GLUCOSE: 255 MG/DL
GLUCOSE SERPL-MCNC: 63 MG/DL
HBA1C MFR BLD HPLC: 10.5 %
HCT VFR BLD CALC: 34 %
HDLC SERPL-MCNC: 66 MG/DL
HGB BLD-MCNC: 10.8 G/DL
IMM GRANULOCYTES NFR BLD AUTO: 3.7 %
LDLC SERPL CALC-MCNC: 111 MG/DL
LDLC SERPL DIRECT ASSAY-MCNC: 113 MG/DL
LYMPHOCYTES # BLD AUTO: 1.43 K/UL
LYMPHOCYTES NFR BLD AUTO: 31.4 %
MAGNESIUM SERPL-MCNC: 1.7 MG/DL
MAN DIFF?: NORMAL
MCHC RBC-ENTMCNC: 31.8 GM/DL
MCHC RBC-ENTMCNC: 32.2 PG
MCV RBC AUTO: 101.5 FL
MONOCYTES # BLD AUTO: 1.16 K/UL
MONOCYTES NFR BLD AUTO: 25.4 %
NEUTROPHILS # BLD AUTO: 1.77 K/UL
NEUTROPHILS NFR BLD AUTO: 38.9 %
NONHDLC SERPL-MCNC: 139 MG/DL
PHOSPHATE SERPL-MCNC: 3 MG/DL
PLATELET # BLD AUTO: 188 K/UL
POTASSIUM SERPL-SCNC: 4.6 MMOL/L
PROT SERPL-MCNC: 7.4 G/DL
RBC # BLD: 3.35 M/UL
RBC # FLD: 15.9 %
SODIUM SERPL-SCNC: 132 MMOL/L
T4 FREE SERPL-MCNC: 1.3 NG/DL
TRIGL SERPL-MCNC: 139 MG/DL
TSH SERPL-ACNC: 0.63 UIU/ML
URATE SERPL-MCNC: 4.4 MG/DL
WBC # FLD AUTO: 4.56 K/UL

## 2023-01-24 ENCOUNTER — APPOINTMENT (OUTPATIENT)
Dept: CARDIOLOGY | Facility: CLINIC | Age: 78
End: 2023-01-24

## 2023-02-02 ENCOUNTER — APPOINTMENT (OUTPATIENT)
Dept: CARDIOLOGY | Facility: CLINIC | Age: 78
End: 2023-02-02
Payer: MEDICARE

## 2023-02-02 PROCEDURE — A9500: CPT

## 2023-02-02 PROCEDURE — 78452 HT MUSCLE IMAGE SPECT MULT: CPT

## 2023-02-02 PROCEDURE — 93306 TTE W/DOPPLER COMPLETE: CPT

## 2023-02-02 PROCEDURE — 93015 CV STRESS TEST SUPVJ I&R: CPT

## 2023-02-02 RX ORDER — REGADENOSON 0.08 MG/ML
0.4 INJECTION, SOLUTION INTRAVENOUS
Qty: 1 | Refills: 0 | Status: COMPLETED | OUTPATIENT
Start: 2023-02-02

## 2023-02-02 RX ADMIN — REGADENOSON 5 MG/5ML: 0.08 INJECTION, SOLUTION INTRAVENOUS at 00:00

## 2023-02-16 ENCOUNTER — APPOINTMENT (OUTPATIENT)
Dept: CARDIOLOGY | Facility: CLINIC | Age: 78
End: 2023-02-16
Payer: MEDICARE

## 2023-02-16 ENCOUNTER — APPOINTMENT (OUTPATIENT)
Dept: ENDOCRINOLOGY | Facility: CLINIC | Age: 78
End: 2023-02-16
Payer: MEDICARE

## 2023-02-16 VITALS
BODY MASS INDEX: 20.72 KG/M2 | WEIGHT: 148 LBS | HEIGHT: 71 IN | DIASTOLIC BLOOD PRESSURE: 40 MMHG | HEART RATE: 99 BPM | SYSTOLIC BLOOD PRESSURE: 120 MMHG | OXYGEN SATURATION: 99 % | TEMPERATURE: 98.6 F

## 2023-02-16 DIAGNOSIS — Z00.00 ENCOUNTER FOR GENERAL ADULT MEDICAL EXAMINATION W/OUT ABNORMAL FINDINGS: ICD-10-CM

## 2023-02-16 LAB — GLUCOSE BLDC GLUCOMTR-MCNC: 233

## 2023-02-16 PROCEDURE — 99214 OFFICE O/P EST MOD 30 MIN: CPT

## 2023-02-16 PROCEDURE — 82962 GLUCOSE BLOOD TEST: CPT

## 2023-02-16 PROCEDURE — 99204 OFFICE O/P NEW MOD 45 MIN: CPT

## 2023-02-16 NOTE — HISTORY OF PRESENT ILLNESS
[FreeTextEntry1] : 77 yearM here for assessment for Type 2 diabetes mellitus\par \par \par Patient with PMHx as below, remarkable for : HTN, HLD, DM, prostate cancer, rectal cancer s/p chemo and RT\par \par Vision problems: stable \par Burning pain or tingling in the feet, legs, hands, other areas: no\par Extreme hunger: no \par Frequent and/or recurring urinary infections: no \par Skin infections: no  \par Slow healing of cuts: no \par \par  \par Screening \par Ophthalmology: follows \par Podiatry: follows\par LDL: on lipitor 40mg po daily  \par EGFR: 67\par \par \par Current diabetic medication regimen (verified with patient): \par glipizide 10mg po daily (increased last mth)\par metformin 1g po bid (increase last mth)\par \par \par \par SMBG ranges (glucometer): reported\par \par am: \par pm: <160\par \par this am PCOT : 233, reports ate oatmeal \par \par No hypos within last 2 weeks, however in january had 2 episodes \par \par \par \par

## 2023-02-17 LAB
ALBUMIN SERPL ELPH-MCNC: 4.3 G/DL
ALP BLD-CCNC: 56 U/L
ALT SERPL-CCNC: 23 U/L
ANION GAP SERPL CALC-SCNC: 18 MMOL/L
AST SERPL-CCNC: 23 U/L
BILIRUB DIRECT SERPL-MCNC: 0.1 MG/DL
BILIRUB INDIRECT SERPL-MCNC: 0.1 MG/DL
BILIRUB SERPL-MCNC: 0.2 MG/DL
BUN SERPL-MCNC: 24 MG/DL
CALCIUM SERPL-MCNC: 10 MG/DL
CHLORIDE SERPL-SCNC: 102 MMOL/L
CHOLEST SERPL-MCNC: 149 MG/DL
CK SERPL-CCNC: 309 U/L
CO2 SERPL-SCNC: 15 MMOL/L
CREAT SERPL-MCNC: 0.97 MG/DL
EGFR: 80 ML/MIN/1.73M2
ESTIMATED AVERAGE GLUCOSE: 223 MG/DL
GLUCOSE SERPL-MCNC: 242 MG/DL
HBA1C MFR BLD HPLC: 9.4 %
HDLC SERPL-MCNC: 49 MG/DL
LDLC SERPL CALC-MCNC: 77 MG/DL
LDLC SERPL DIRECT ASSAY-MCNC: 81 MG/DL
NONHDLC SERPL-MCNC: 101 MG/DL
POTASSIUM SERPL-SCNC: 4.6 MMOL/L
PROT SERPL-MCNC: 7.2 G/DL
SODIUM SERPL-SCNC: 135 MMOL/L
TRIGL SERPL-MCNC: 117 MG/DL

## 2023-02-19 NOTE — HISTORY OF PRESENT ILLNESS
[FreeTextEntry1] : 78 yo male with HTN, HLD, DM, Prostate CA and rectal CA (followed by Dr. Barcenas/ Dr. Vieira) presents today for follow up. \par The patient had a recent nuclear stress test, 2D Doppler echo  both were within acceptable limits if needed.\par The patient presents for evaluation of high blood pressure. Patient is currently tolerating the current antihypertensive regime and they deny headaches, stiff neck, visual changes, pedal Edema or PND. They also are here for follow-up of elevated cholesterol and continued care of diabetes mellitus. Patient is currently tolerating medication and denies muscle pain, joint pain, back pain, tea, nausea, vomiting, abdominal pain or diarrhea. The patient is trying to follow a low cholesterol diet. The patient is following the diabetic regimen and is tolerating hypoglycemic agents and following the diet.\par \par Pt's PCP Dr. Melinda Pereira \par \par Pt saw Dr. Ortiz for a1c 10.2, advise to continue higher dose metformin and glipizide\par 02/02- TTE LVEF 55-60%, mvp\par 02/02- STP Normal MPI\par \par Pt states appetite improved since prior. Still on 2 boost shakes daily. \par \par s/p labs na 132, ast 44, ldl 111

## 2023-03-09 ENCOUNTER — INPATIENT (INPATIENT)
Facility: HOSPITAL | Age: 78
LOS: 0 days | Discharge: HOME | DRG: 175 | End: 2023-03-10
Attending: HOSPITALIST | Admitting: STUDENT IN AN ORGANIZED HEALTH CARE EDUCATION/TRAINING PROGRAM
Payer: MEDICARE

## 2023-03-09 ENCOUNTER — APPOINTMENT (OUTPATIENT)
Dept: MRI IMAGING | Facility: CLINIC | Age: 78
End: 2023-03-09
Payer: MEDICARE

## 2023-03-09 ENCOUNTER — APPOINTMENT (OUTPATIENT)
Dept: CT IMAGING | Facility: CLINIC | Age: 78
End: 2023-03-09
Payer: MEDICARE

## 2023-03-09 ENCOUNTER — OUTPATIENT (OUTPATIENT)
Dept: OUTPATIENT SERVICES | Facility: HOSPITAL | Age: 78
LOS: 1 days | End: 2023-03-09
Payer: MEDICARE

## 2023-03-09 ENCOUNTER — NON-APPOINTMENT (OUTPATIENT)
Age: 78
End: 2023-03-09

## 2023-03-09 VITALS
OXYGEN SATURATION: 100 % | TEMPERATURE: 98 F | HEART RATE: 98 BPM | RESPIRATION RATE: 19 BRPM | HEIGHT: 68 IN | SYSTOLIC BLOOD PRESSURE: 134 MMHG | WEIGHT: 149.91 LBS | DIASTOLIC BLOOD PRESSURE: 75 MMHG

## 2023-03-09 DIAGNOSIS — I26.99 OTHER PULMONARY EMBOLISM WITHOUT ACUTE COR PULMONALE: ICD-10-CM

## 2023-03-09 DIAGNOSIS — C20 MALIGNANT NEOPLASM OF RECTUM: ICD-10-CM

## 2023-03-09 LAB
ALBUMIN SERPL ELPH-MCNC: 4 G/DL — SIGNIFICANT CHANGE UP (ref 3.3–5)
ALP SERPL-CCNC: 57 U/L — SIGNIFICANT CHANGE UP (ref 40–120)
ALT FLD-CCNC: 36 U/L — SIGNIFICANT CHANGE UP (ref 10–45)
ANION GAP SERPL CALC-SCNC: 13 MMOL/L — SIGNIFICANT CHANGE UP (ref 5–17)
APTT BLD: 26.1 SEC — LOW (ref 27.5–35.5)
AST SERPL-CCNC: 25 U/L — SIGNIFICANT CHANGE UP (ref 10–40)
BASOPHILS # BLD AUTO: 0 K/UL — SIGNIFICANT CHANGE UP (ref 0–0.2)
BASOPHILS NFR BLD AUTO: 0 % — SIGNIFICANT CHANGE UP (ref 0–2)
BILIRUB SERPL-MCNC: 0.2 MG/DL — SIGNIFICANT CHANGE UP (ref 0.2–1.2)
BUN SERPL-MCNC: 18 MG/DL — SIGNIFICANT CHANGE UP (ref 7–23)
CALCIUM SERPL-MCNC: 9.7 MG/DL — SIGNIFICANT CHANGE UP (ref 8.4–10.5)
CHLORIDE SERPL-SCNC: 102 MMOL/L — SIGNIFICANT CHANGE UP (ref 96–108)
CO2 SERPL-SCNC: 19 MMOL/L — LOW (ref 22–31)
CREAT SERPL-MCNC: 0.98 MG/DL — SIGNIFICANT CHANGE UP (ref 0.5–1.3)
EGFR: 79 ML/MIN/1.73M2 — SIGNIFICANT CHANGE UP
EOSINOPHIL # BLD AUTO: 0 K/UL — SIGNIFICANT CHANGE UP (ref 0–0.5)
EOSINOPHIL NFR BLD AUTO: 0 % — SIGNIFICANT CHANGE UP (ref 0–6)
FLUAV AG NPH QL: SIGNIFICANT CHANGE UP
FLUBV AG NPH QL: SIGNIFICANT CHANGE UP
GLUCOSE SERPL-MCNC: 246 MG/DL — HIGH (ref 70–99)
HCT VFR BLD CALC: 30.7 % — LOW (ref 39–50)
HGB BLD-MCNC: 10.1 G/DL — LOW (ref 13–17)
INR BLD: 1.07 RATIO — SIGNIFICANT CHANGE UP (ref 0.88–1.16)
LYMPHOCYTES # BLD AUTO: 1.04 K/UL — SIGNIFICANT CHANGE UP (ref 1–3.3)
LYMPHOCYTES # BLD AUTO: 13.9 % — SIGNIFICANT CHANGE UP (ref 13–44)
MCHC RBC-ENTMCNC: 31.9 PG — SIGNIFICANT CHANGE UP (ref 27–34)
MCHC RBC-ENTMCNC: 32.9 GM/DL — SIGNIFICANT CHANGE UP (ref 32–36)
MCV RBC AUTO: 96.8 FL — SIGNIFICANT CHANGE UP (ref 80–100)
MONOCYTES # BLD AUTO: 0.58 K/UL — SIGNIFICANT CHANGE UP (ref 0–0.9)
MONOCYTES NFR BLD AUTO: 7.8 % — SIGNIFICANT CHANGE UP (ref 2–14)
NEUTROPHILS # BLD AUTO: 5.83 K/UL — SIGNIFICANT CHANGE UP (ref 1.8–7.4)
NEUTROPHILS NFR BLD AUTO: 77.4 % — HIGH (ref 43–77)
PLATELET # BLD AUTO: 220 K/UL — SIGNIFICANT CHANGE UP (ref 150–400)
POTASSIUM SERPL-MCNC: 4.5 MMOL/L — SIGNIFICANT CHANGE UP (ref 3.5–5.3)
POTASSIUM SERPL-SCNC: 4.5 MMOL/L — SIGNIFICANT CHANGE UP (ref 3.5–5.3)
PROT SERPL-MCNC: 6.8 G/DL — SIGNIFICANT CHANGE UP (ref 6–8.3)
PROTHROM AB SERPL-ACNC: 12.4 SEC — SIGNIFICANT CHANGE UP (ref 10.5–13.4)
RBC # BLD: 3.17 M/UL — LOW (ref 4.2–5.8)
RBC # FLD: 12.5 % — SIGNIFICANT CHANGE UP (ref 10.3–14.5)
RSV RNA NPH QL NAA+NON-PROBE: SIGNIFICANT CHANGE UP
SARS-COV-2 RNA SPEC QL NAA+PROBE: DETECTED
SODIUM SERPL-SCNC: 134 MMOL/L — LOW (ref 135–145)
WBC # BLD: 7.45 K/UL — SIGNIFICANT CHANGE UP (ref 3.8–10.5)
WBC # FLD AUTO: 7.45 K/UL — SIGNIFICANT CHANGE UP (ref 3.8–10.5)

## 2023-03-09 PROCEDURE — 72197 MRI PELVIS W/O & W/DYE: CPT

## 2023-03-09 PROCEDURE — 72197 MRI PELVIS W/O & W/DYE: CPT | Mod: 26

## 2023-03-09 PROCEDURE — 71260 CT THORAX DX C+: CPT | Mod: 26

## 2023-03-09 PROCEDURE — 74177 CT ABD & PELVIS W/CONTRAST: CPT | Mod: 26

## 2023-03-09 PROCEDURE — 99285 EMERGENCY DEPT VISIT HI MDM: CPT

## 2023-03-09 PROCEDURE — 74177 CT ABD & PELVIS W/CONTRAST: CPT

## 2023-03-09 PROCEDURE — A9585: CPT

## 2023-03-09 PROCEDURE — 71260 CT THORAX DX C+: CPT

## 2023-03-09 RX ORDER — HEPARIN SODIUM 5000 [USP'U]/ML
INJECTION INTRAVENOUS; SUBCUTANEOUS
Qty: 25000 | Refills: 0 | Status: DISCONTINUED | OUTPATIENT
Start: 2023-03-09 | End: 2023-03-10

## 2023-03-09 RX ORDER — HEPARIN SODIUM 5000 [USP'U]/ML
5500 INJECTION INTRAVENOUS; SUBCUTANEOUS ONCE
Refills: 0 | Status: COMPLETED | OUTPATIENT
Start: 2023-03-09 | End: 2023-03-09

## 2023-03-09 RX ADMIN — HEPARIN SODIUM 5500 UNIT(S): 5000 INJECTION INTRAVENOUS; SUBCUTANEOUS at 23:09

## 2023-03-09 RX ADMIN — HEPARIN SODIUM 1200 UNIT(S)/HR: 5000 INJECTION INTRAVENOUS; SUBCUTANEOUS at 23:10

## 2023-03-09 NOTE — ED PROVIDER NOTE - IV ALTEPLASE INCLUSION HIDDEN

## 2023-03-09 NOTE — ED PROVIDER NOTE - ATTENDING APP SHARED VISIT CONTRIBUTION OF CARE
78yo M with PMH of rectal CA s/p chemo (last in 12/2022), HTN, HLD, DMT2, sent in by surgeon today for incidental PE found on outpatient CT. pt denies sob or cp, hr 90s but otherwise vss with segmental and subsegmental pe seen on imaging today, heparin started for admission to med.

## 2023-03-09 NOTE — ED PROVIDER NOTE - NS ED ATTENDING STATEMENT MOD
This was a shared visit with the AAN. I reviewed and verified the documentation and independently performed the documented:

## 2023-03-09 NOTE — ED ADULT NURSE NOTE - OBJECTIVE STATEMENT
77 y.o male, A&Ox4, PMH rectal cancer, HLD, DM, pt presents to ED c/o abnormal CT/MRI. per granddaughter at bedside states pt went for a routine outpatient CT/MRI for pre 77 y.o male, A&Ox4, PMH rectal cancer, HLD, DM, pt presents to ED c/o abnormal CT/MRI. per granddaughter at bedside states pt went for a routine outpatient CT/MRI for pre-surgical interventions to assess size of tumor, granddaughter states she got a call from the surgeon today stating that a blood clot was found in pt lung and to come to ED for further testing and interventions. upon assessment pt denies chest pain, sob, cough, N/V/D, fevers, chills. pt placed on cardiac monitor, IV access established, pt safety and comfort provided.

## 2023-03-09 NOTE — ED PROVIDER NOTE - OBJECTIVE STATEMENT
78yo M with PMH of rectal CA s/p chemo (last in 12/2022), HTN, HLD, DMT2, sent in by surgeon today for incidental PE found on outpatient CT today. Pt with granddaughter at bedside assisting with history. Reports scans were ordered to assess tumor size prior to surgery, called by surgeon regarding PE and advised to go to ER. CT results noted in PACS + "Segmental and subsegmental pulmonary emboli." Patient reports he is feeling well. Denies any dizziness, CP, palpitations, SOB, abdominal pain, n/v, LE pain/swelling, h/o blood clots, blood thinner use.   Surgeon Pernell Barcenas  PCP/cards Dilip Christopher 78yo M with PMH of rectal CA s/p chemo (last in 12/2022), HTN, HLD, DMT2, sent in by surgeon today for incidental PE found on outpatient CT today. Pt with granddaughter at bedside assisting with history. Reports scans were ordered to assess tumor size prior to surgery, called by surgeon regarding PE and advised to go to ER. CT results noted in PACS + "Segmental and subsegmental pulmonary emboli." Patient reports he is feeling well. Denies any dizziness, CP, palpitations, SOB, abdominal pain, n/v, melena, LE pain/swelling, h/o blood clots, blood thinner use.   Surgeon Pernell Barcenas  PCP/cards Dilip Christopher

## 2023-03-09 NOTE — ED ADULT TRIAGE NOTE - CHIEF COMPLAINT QUOTE
Sent by surgeon for abnormal MRI, showed "blood clot in lung". Sent by surgeon for further treatment.

## 2023-03-10 ENCOUNTER — TRANSCRIPTION ENCOUNTER (OUTPATIENT)
Age: 78
End: 2023-03-10

## 2023-03-10 VITALS
RESPIRATION RATE: 18 BRPM | SYSTOLIC BLOOD PRESSURE: 115 MMHG | OXYGEN SATURATION: 98 % | HEART RATE: 83 BPM | DIASTOLIC BLOOD PRESSURE: 67 MMHG | TEMPERATURE: 98 F

## 2023-03-10 DIAGNOSIS — I26.99 OTHER PULMONARY EMBOLISM WITHOUT ACUTE COR PULMONALE: ICD-10-CM

## 2023-03-10 DIAGNOSIS — E11.9 TYPE 2 DIABETES MELLITUS WITHOUT COMPLICATIONS: ICD-10-CM

## 2023-03-10 DIAGNOSIS — Z29.9 ENCOUNTER FOR PROPHYLACTIC MEASURES, UNSPECIFIED: ICD-10-CM

## 2023-03-10 DIAGNOSIS — C20 MALIGNANT NEOPLASM OF RECTUM: ICD-10-CM

## 2023-03-10 DIAGNOSIS — E78.5 HYPERLIPIDEMIA, UNSPECIFIED: ICD-10-CM

## 2023-03-10 LAB
ALBUMIN SERPL ELPH-MCNC: 3.7 G/DL — SIGNIFICANT CHANGE UP (ref 3.3–5)
ALP SERPL-CCNC: 52 U/L — SIGNIFICANT CHANGE UP (ref 40–120)
ALT FLD-CCNC: 33 U/L — SIGNIFICANT CHANGE UP (ref 10–45)
ANION GAP SERPL CALC-SCNC: 13 MMOL/L — SIGNIFICANT CHANGE UP (ref 5–17)
APTT BLD: 117.2 SEC — HIGH (ref 27.5–35.5)
AST SERPL-CCNC: 22 U/L — SIGNIFICANT CHANGE UP (ref 10–40)
BILIRUB SERPL-MCNC: 0.2 MG/DL — SIGNIFICANT CHANGE UP (ref 0.2–1.2)
BUN SERPL-MCNC: 19 MG/DL — SIGNIFICANT CHANGE UP (ref 7–23)
CALCIUM SERPL-MCNC: 9.6 MG/DL — SIGNIFICANT CHANGE UP (ref 8.4–10.5)
CHLORIDE SERPL-SCNC: 104 MMOL/L — SIGNIFICANT CHANGE UP (ref 96–108)
CO2 SERPL-SCNC: 19 MMOL/L — LOW (ref 22–31)
CREAT SERPL-MCNC: 1.04 MG/DL — SIGNIFICANT CHANGE UP (ref 0.5–1.3)
EGFR: 74 ML/MIN/1.73M2 — SIGNIFICANT CHANGE UP
GLUCOSE BLDC GLUCOMTR-MCNC: 221 MG/DL — HIGH (ref 70–99)
GLUCOSE BLDC GLUCOMTR-MCNC: 248 MG/DL — HIGH (ref 70–99)
GLUCOSE BLDC GLUCOMTR-MCNC: 305 MG/DL — HIGH (ref 70–99)
GLUCOSE SERPL-MCNC: 240 MG/DL — HIGH (ref 70–99)
HCT VFR BLD CALC: 29.3 % — LOW (ref 39–50)
HGB BLD-MCNC: 9.5 G/DL — LOW (ref 13–17)
MAGNESIUM SERPL-MCNC: 1.6 MG/DL — SIGNIFICANT CHANGE UP (ref 1.6–2.6)
MCHC RBC-ENTMCNC: 31.6 PG — SIGNIFICANT CHANGE UP (ref 27–34)
MCHC RBC-ENTMCNC: 32.4 GM/DL — SIGNIFICANT CHANGE UP (ref 32–36)
MCV RBC AUTO: 97.3 FL — SIGNIFICANT CHANGE UP (ref 80–100)
MRSA PCR RESULT.: SIGNIFICANT CHANGE UP
NRBC # BLD: 0 /100 WBCS — SIGNIFICANT CHANGE UP (ref 0–0)
NT-PROBNP SERPL-SCNC: 234 PG/ML — SIGNIFICANT CHANGE UP (ref 0–300)
PHOSPHATE SERPL-MCNC: 3.5 MG/DL — SIGNIFICANT CHANGE UP (ref 2.5–4.5)
PLATELET # BLD AUTO: 233 K/UL — SIGNIFICANT CHANGE UP (ref 150–400)
POTASSIUM SERPL-MCNC: 4.5 MMOL/L — SIGNIFICANT CHANGE UP (ref 3.5–5.3)
POTASSIUM SERPL-SCNC: 4.5 MMOL/L — SIGNIFICANT CHANGE UP (ref 3.5–5.3)
PROT SERPL-MCNC: 6.1 G/DL — SIGNIFICANT CHANGE UP (ref 6–8.3)
RBC # BLD: 3.01 M/UL — LOW (ref 4.2–5.8)
RBC # FLD: 12.8 % — SIGNIFICANT CHANGE UP (ref 10.3–14.5)
S AUREUS DNA NOSE QL NAA+PROBE: SIGNIFICANT CHANGE UP
SODIUM SERPL-SCNC: 136 MMOL/L — SIGNIFICANT CHANGE UP (ref 135–145)
TROPONIN T, HIGH SENSITIVITY RESULT: 42 NG/L — SIGNIFICANT CHANGE UP (ref 0–51)
TROPONIN T, HIGH SENSITIVITY RESULT: 47 NG/L — SIGNIFICANT CHANGE UP (ref 0–51)
WBC # BLD: 6.76 K/UL — SIGNIFICANT CHANGE UP (ref 3.8–10.5)
WBC # FLD AUTO: 6.76 K/UL — SIGNIFICANT CHANGE UP (ref 3.8–10.5)

## 2023-03-10 PROCEDURE — 83735 ASSAY OF MAGNESIUM: CPT

## 2023-03-10 PROCEDURE — 85025 COMPLETE CBC W/AUTO DIFF WBC: CPT

## 2023-03-10 PROCEDURE — 87641 MR-STAPH DNA AMP PROBE: CPT

## 2023-03-10 PROCEDURE — 99223 1ST HOSP IP/OBS HIGH 75: CPT | Mod: GC

## 2023-03-10 PROCEDURE — 87640 STAPH A DNA AMP PROBE: CPT

## 2023-03-10 PROCEDURE — 93306 TTE W/DOPPLER COMPLETE: CPT | Mod: 26

## 2023-03-10 PROCEDURE — 85610 PROTHROMBIN TIME: CPT

## 2023-03-10 PROCEDURE — 85730 THROMBOPLASTIN TIME PARTIAL: CPT

## 2023-03-10 PROCEDURE — 12345: CPT | Mod: NC,GC

## 2023-03-10 PROCEDURE — 99285 EMERGENCY DEPT VISIT HI MDM: CPT

## 2023-03-10 PROCEDURE — 80053 COMPREHEN METABOLIC PANEL: CPT

## 2023-03-10 PROCEDURE — 93306 TTE W/DOPPLER COMPLETE: CPT

## 2023-03-10 PROCEDURE — 36415 COLL VENOUS BLD VENIPUNCTURE: CPT

## 2023-03-10 PROCEDURE — 93005 ELECTROCARDIOGRAM TRACING: CPT

## 2023-03-10 PROCEDURE — 83880 ASSAY OF NATRIURETIC PEPTIDE: CPT

## 2023-03-10 PROCEDURE — G0378: CPT

## 2023-03-10 PROCEDURE — 87637 SARSCOV2&INF A&B&RSV AMP PRB: CPT

## 2023-03-10 PROCEDURE — 85027 COMPLETE CBC AUTOMATED: CPT

## 2023-03-10 PROCEDURE — 84484 ASSAY OF TROPONIN QUANT: CPT

## 2023-03-10 PROCEDURE — 84100 ASSAY OF PHOSPHORUS: CPT

## 2023-03-10 PROCEDURE — 82962 GLUCOSE BLOOD TEST: CPT

## 2023-03-10 RX ORDER — ATORVASTATIN CALCIUM 80 MG/1
20 TABLET, FILM COATED ORAL AT BEDTIME
Refills: 0 | Status: DISCONTINUED | OUTPATIENT
Start: 2023-03-10 | End: 2023-03-10

## 2023-03-10 RX ORDER — DEXTROSE 50 % IN WATER 50 %
15 SYRINGE (ML) INTRAVENOUS ONCE
Refills: 0 | Status: DISCONTINUED | OUTPATIENT
Start: 2023-03-10 | End: 2023-03-10

## 2023-03-10 RX ORDER — SODIUM CHLORIDE 9 MG/ML
1000 INJECTION, SOLUTION INTRAVENOUS
Refills: 0 | Status: DISCONTINUED | OUTPATIENT
Start: 2023-03-10 | End: 2023-03-10

## 2023-03-10 RX ORDER — ONDANSETRON 8 MG/1
4 TABLET, FILM COATED ORAL EVERY 8 HOURS
Refills: 0 | Status: DISCONTINUED | OUTPATIENT
Start: 2023-03-10 | End: 2023-03-10

## 2023-03-10 RX ORDER — INSULIN LISPRO 100/ML
VIAL (ML) SUBCUTANEOUS
Refills: 0 | Status: DISCONTINUED | OUTPATIENT
Start: 2023-03-10 | End: 2023-03-10

## 2023-03-10 RX ORDER — PANTOPRAZOLE SODIUM 20 MG/1
40 TABLET, DELAYED RELEASE ORAL
Refills: 0 | Status: DISCONTINUED | OUTPATIENT
Start: 2023-03-10 | End: 2023-03-10

## 2023-03-10 RX ORDER — FOLIC ACID 0.8 MG
1 TABLET ORAL DAILY
Refills: 0 | Status: DISCONTINUED | OUTPATIENT
Start: 2023-03-10 | End: 2023-03-10

## 2023-03-10 RX ORDER — LANOLIN ALCOHOL/MO/W.PET/CERES
3 CREAM (GRAM) TOPICAL AT BEDTIME
Refills: 0 | Status: DISCONTINUED | OUTPATIENT
Start: 2023-03-10 | End: 2023-03-10

## 2023-03-10 RX ORDER — APIXABAN 2.5 MG/1
10 TABLET, FILM COATED ORAL EVERY 12 HOURS
Refills: 0 | Status: DISCONTINUED | OUTPATIENT
Start: 2023-03-10 | End: 2023-03-10

## 2023-03-10 RX ORDER — APIXABAN 2.5 MG/1
1 TABLET, FILM COATED ORAL
Qty: 60 | Refills: 0
Start: 2023-03-10 | End: 2023-04-08

## 2023-03-10 RX ORDER — CHLORHEXIDINE GLUCONATE 213 G/1000ML
1 SOLUTION TOPICAL DAILY
Refills: 0 | Status: DISCONTINUED | OUTPATIENT
Start: 2023-03-10 | End: 2023-03-10

## 2023-03-10 RX ORDER — DEXTROSE 50 % IN WATER 50 %
25 SYRINGE (ML) INTRAVENOUS ONCE
Refills: 0 | Status: DISCONTINUED | OUTPATIENT
Start: 2023-03-10 | End: 2023-03-10

## 2023-03-10 RX ORDER — INSULIN LISPRO 100/ML
VIAL (ML) SUBCUTANEOUS AT BEDTIME
Refills: 0 | Status: DISCONTINUED | OUTPATIENT
Start: 2023-03-10 | End: 2023-03-10

## 2023-03-10 RX ORDER — GLUCAGON INJECTION, SOLUTION 0.5 MG/.1ML
1 INJECTION, SOLUTION SUBCUTANEOUS ONCE
Refills: 0 | Status: DISCONTINUED | OUTPATIENT
Start: 2023-03-10 | End: 2023-03-10

## 2023-03-10 RX ORDER — MAGNESIUM SULFATE 500 MG/ML
2 VIAL (ML) INJECTION ONCE
Refills: 0 | Status: COMPLETED | OUTPATIENT
Start: 2023-03-10 | End: 2023-03-10

## 2023-03-10 RX ORDER — DEXTROSE 50 % IN WATER 50 %
12.5 SYRINGE (ML) INTRAVENOUS ONCE
Refills: 0 | Status: DISCONTINUED | OUTPATIENT
Start: 2023-03-10 | End: 2023-03-10

## 2023-03-10 RX ORDER — ACETAMINOPHEN 500 MG
650 TABLET ORAL EVERY 6 HOURS
Refills: 0 | Status: DISCONTINUED | OUTPATIENT
Start: 2023-03-10 | End: 2023-03-10

## 2023-03-10 RX ORDER — INSULIN GLARGINE 100 [IU]/ML
10 INJECTION, SOLUTION SUBCUTANEOUS AT BEDTIME
Refills: 0 | Status: DISCONTINUED | OUTPATIENT
Start: 2023-03-10 | End: 2023-03-10

## 2023-03-10 RX ADMIN — PANTOPRAZOLE SODIUM 40 MILLIGRAM(S): 20 TABLET, DELAYED RELEASE ORAL at 05:30

## 2023-03-10 RX ADMIN — Medication 1 MILLIGRAM(S): at 11:04

## 2023-03-10 RX ADMIN — Medication 2: at 12:18

## 2023-03-10 RX ADMIN — Medication 25 GRAM(S): at 09:03

## 2023-03-10 RX ADMIN — HEPARIN SODIUM 1100 UNIT(S)/HR: 5000 INJECTION INTRAVENOUS; SUBCUTANEOUS at 10:05

## 2023-03-10 RX ADMIN — Medication 1 TABLET(S): at 11:03

## 2023-03-10 RX ADMIN — HEPARIN SODIUM 1200 UNIT(S)/HR: 5000 INJECTION INTRAVENOUS; SUBCUTANEOUS at 01:07

## 2023-03-10 RX ADMIN — Medication 4: at 16:43

## 2023-03-10 RX ADMIN — APIXABAN 10 MILLIGRAM(S): 2.5 TABLET, FILM COATED ORAL at 17:55

## 2023-03-10 RX ADMIN — APIXABAN 10 MILLIGRAM(S): 2.5 TABLET, FILM COATED ORAL at 10:50

## 2023-03-10 RX ADMIN — CHLORHEXIDINE GLUCONATE 1 APPLICATION(S): 213 SOLUTION TOPICAL at 11:04

## 2023-03-10 NOTE — PROGRESS NOTE ADULT - ASSESSMENT
77M w/ rectal cancer s/p radiation, FOLFOX (last 12/2022), HTN, DM presenting w/ segmental and subsegmental PEs incidentally discovered on outpatient imaging; HD stable on heparin gtt.  77M w/ rectal cancer s/p radiation, FOLFOX (last 12/2022), HTN, DM presenting w/ segmental and subsegmental PEs incidentally discovered on outpatient imaging; HD stable on heparin gtt.  Plan for transition to eliquis, DC today.

## 2023-03-10 NOTE — H&P ADULT - PROBLEM SELECTOR PLAN 1
CT chest 3/9 w/ PE in lingula segment and RLL subsegmental branches. Heart size normal w/o effusion. No metastatic lesions identified in thorax. Patient HD stable, w/o hypoxia, tachycardia. Provoked PE in s/o active malignancy; also has covid-19 infection increasing likelihood of thromboembolic events. Megestrol can also increase risk of clots.   - f/u trop, BNP --> added on to initial labs  - f/u LE duplex to r/o DVT  - consider formal TTE; heart normal size on CT chest  - continue heparin gtt  - plan to transition to DOAC on discharge  - dc megestrol (taking for appetite stimulation)

## 2023-03-10 NOTE — H&P ADULT - ATTENDING COMMENTS
77M  pmh rectal cancer s/p radiation, FOLFOX (last 12/2022), HTN, DM p/w segmental and subsegmental PEs incidentally discovered on outpatient imaging, admitted for further management       #Acute PE    - CT chest 3/9: PE in lingula segment and RLL subsegmental branches. Heart size normal w/o effusion. No metastatic lesions identified in thorax.    - EKG NSR   - VSS, nontoxic, no hypoxia   - DDx: likely provoked PE iso active malignancy, + covid-19 infection + megestrol use, all of which increases likelihood of thromboembolic events   - Hep gtt >>> eventual transition to DOAC on d/c    - check echo    - f/u trop, BNP    - LE doppler to r/o DVT   - d/c Megestrol    - Telemetry      #Rectal Ca   -  Hx/o rectal Ca s/p chemo, RT, completed FOLFOX adjuvant chemo 12/2022, next planned for possible surgical resection   -  F/u with O/p Onco on d/c           Rest per resident

## 2023-03-10 NOTE — DISCHARGE NOTE NURSING/CASE MANAGEMENT/SOCIAL WORK - PATIENT PORTAL LINK FT
You can access the FollowMyHealth Patient Portal offered by [facility] by registering at the following website: [email or web address]. By joining [facility]’s FollowMyHealth portal, you will also be able to view your health information using other applications (apps) compatible with our system.

## 2023-03-10 NOTE — DISCHARGE NOTE NURSING/CASE MANAGEMENT/SOCIAL WORK - NSDCPEFALRISK_GEN_ALL_CORE
For information on Fall & Injury Prevention, visit: [email or web address] OR  [email or web address] OR  https://www.cdc.gov/steadi/patient.html

## 2023-03-10 NOTE — DISCHARGE NOTE PROVIDER - HOSPITAL COURSE
77M w/ rectal CA (last chemo 12/2022), HTN, HLD, DM2 sent to ED by surgeon for PE discovered incidentally on outpatient CT. Patient reports feeling well - denies CP, palpitations, SOB, abdominal pain, n/v, LE pain, swelling, headache. Denies personal/family history of clotting or bleeding disorders. No changes to medications recently. No blood thinner use.   He began neoadjuvant radiation on 6/1/22 and completed treatment 6/7/2022. Patient was on total neoadjuvant therapy regimen with short course RT followed by 12-16 weeks of FOLFOX, last session 12/2022. Was having routine CT for restaging and planning for upcoming surgery.     ED vitals: 134/75, HR 98, % RA, afebrile  ED course: Started on heparin gtt.     Pt HD stable on floors. 77M w/ rectal CA (last chemo 12/2022), HTN, HLD, DM2 sent to ED by surgeon for PE discovered incidentally on outpatient CT. Patient reports feeling well - denies CP, palpitations, SOB, abdominal pain, n/v, LE pain, swelling, headache. Denies personal/family history of clotting or bleeding disorders. No changes to medications recently. No blood thinner use.   He began neoadjuvant radiation on 6/1/22 and completed treatment 6/7/2022. Patient was on total neoadjuvant therapy regimen with short course RT followed by 12-16 weeks of FOLFOX, last session 12/2022. Was having routine CT for restaging and planning for upcoming surgery.     ED vitals: 134/75, HR 98, % RA, afebrile  ED course: Started on heparin gtt.     Pt HD stable on floors. Started on eliquis. Dc home with outpt f/u

## 2023-03-10 NOTE — H&P ADULT - PROBLEM SELECTOR PLAN 5
Diet: carb consistent  DVT:   Dispo: Diet: carb consistent  DVT: Heparin gtt  Dispo: coming from home, lives with family, walks w/ walker

## 2023-03-10 NOTE — PROGRESS NOTE ADULT - PROBLEM SELECTOR PLAN 3
Home meds: metformin 1000mg BID, glipizide 10mg qd, tradjenta 5mg qd; last A1c 11.9  Lantus 10u qhs  Low ISS while hospitalized

## 2023-03-10 NOTE — DISCHARGE NOTE PROVIDER - ATTENDING DISCHARGE PHYSICAL EXAMINATION:
patient seen and examined. no acute complaints. denies sob or chest pain.   O2 sat 100% on RA   lungs cta b/l, no LE edema     segmental and subsegemental RLL PE - eliquis with copay discussed with patient, medication reviewed with patient by pharmacy. pt told to stop megestrol.   follow up with surgery/onc for rectal cancer follow up.

## 2023-03-10 NOTE — DISCHARGE NOTE PROVIDER - PROVIDER TOKENS
PROVIDER:[TOKEN:[[id number]],FOLLOWUP:[1 week],ESTABLISHEDPATIENT:[T]] PROVIDER:[TOKEN:[[id number]],FOLLOWUP:[1 week],ESTABLISHEDPATIENT:[T]],PROVIDER:[TOKEN:[[id number]],FOLLOWUP:[1 week],ESTABLISHEDPATIENT:[T]],PROVIDER:[TOKEN:[[id number]],FOLLOWUP:[1 week],ESTABLISHEDPATIENT:[T]]

## 2023-03-10 NOTE — PATIENT PROFILE ADULT - FALL HARM RISK - HARM RISK INTERVENTIONS

## 2023-03-10 NOTE — DISCHARGE NOTE PROVIDER - NSDCCPCAREPLAN_GEN_ALL_CORE_FT
PRINCIPAL DISCHARGE DIAGNOSIS  Diagnosis: Pulmonary embolism  Assessment and Plan of Treatment: You presented to the hospital after beng found to have a clot in your lungs. You are being discharged on a blood thinner. Please tkae this twice daily as prescribed and follow up with your PCP as an outpatient.       PRINCIPAL DISCHARGE DIAGNOSIS  Diagnosis: Pulmonary embolism  Assessment and Plan of Treatment: You presented to the hospital after beng found to have a clot in your lungs. You are being discharged on a blood thinner called Eliquis. Please tkae this twice daily as prescribed and follow up with your PCP as an outpatient.       PRINCIPAL DISCHARGE DIAGNOSIS  Diagnosis: Pulmonary embolism  Assessment and Plan of Treatment: You presented to the hospital after beng found to have a clot in your lungs. You are being discharged on a blood thinner called Eliquis. Please tkae this twice daily as prescribed and follow up with your PCP as an outpatient. Please do not continue taking your Megestrol as it may increase your risk of clot.

## 2023-03-10 NOTE — PROGRESS NOTE ADULT - SUBJECTIVE AND OBJECTIVE BOX
Medicine Progress Note      Patient is a 77y old  Male who presents with a chief complaint of PE (10 Mar 2023 00:16)      SUBJECTIVE / OVERNIGHT EVENTS: This AM, patient seen and examined at bedside.      MEDICATIONS  (STANDING):  atorvastatin 20 milliGRAM(s) Oral at bedtime  chlorhexidine 2% Cloths 1 Application(s) Topical daily  dextrose 5%. 1000 milliLiter(s) (100 mL/Hr) IV Continuous <Continuous>  dextrose 5%. 1000 milliLiter(s) (50 mL/Hr) IV Continuous <Continuous>  dextrose 50% Injectable 25 Gram(s) IV Push once  dextrose 50% Injectable 12.5 Gram(s) IV Push once  dextrose 50% Injectable 25 Gram(s) IV Push once  folic acid 1 milliGRAM(s) Oral daily  glucagon  Injectable 1 milliGRAM(s) IntraMuscular once  heparin  Infusion.  Unit(s)/Hr (12 mL/Hr) IV Continuous <Continuous>  insulin glargine Injectable (LANTUS) 10 Unit(s) SubCutaneous at bedtime  multivitamin 1 Tablet(s) Oral daily  pantoprazole    Tablet 40 milliGRAM(s) Oral before breakfast    MEDICATIONS  (PRN):  acetaminophen     Tablet .. 650 milliGRAM(s) Oral every 6 hours PRN Temp greater or equal to 38C (100.4F), Mild Pain (1 - 3)  aluminum hydroxide/magnesium hydroxide/simethicone Suspension 30 milliLiter(s) Oral every 4 hours PRN Dyspepsia  dextrose Oral Gel 15 Gram(s) Oral once PRN Blood Glucose LESS THAN 70 milliGRAM(s)/deciliter  melatonin 3 milliGRAM(s) Oral at bedtime PRN Insomnia  ondansetron Injectable 4 milliGRAM(s) IV Push every 8 hours PRN Nausea and/or Vomiting    CAPILLARY BLOOD GLUCOSE        I&O's Summary    09 Mar 2023 07:01  -  10 Mar 2023 07:00  --------------------------------------------------------  IN: 0 mL / OUT: 480 mL / NET: -480 mL        PHYSICAL EXAM:  Vital Signs Last 24 Hrs  T(C): 36.7 (10 Mar 2023 05:31), Max: 36.8 (09 Mar 2023 21:47)  T(F): 98.1 (10 Mar 2023 05:31), Max: 98.2 (09 Mar 2023 21:47)  HR: 77 (10 Mar 2023 05:31) (77 - 98)  BP: 118/68 (10 Mar 2023 05:31) (117/70 - 134/75)  BP(mean): --  RR: 18 (10 Mar 2023 05:31) (18 - 20)  SpO2: 100% (10 Mar 2023 05:31) (99% - 100%)    Parameters below as of 10 Mar 2023 05:31  Patient On (Oxygen Delivery Method): room air      CONSTITUTIONAL: NAD, well-developed, well-groomed  HEENT: Moist oral mucosa, no pharyngeal injection or exudates  RESPIRATORY: Normal respiratory effort; lungs are clear to auscultation bilaterally  CARDIOVASCULAR: Regular rate and rhythm, normal S1 and S2, no murmur/rub/gallop, no lower extremity edema, peripheral pulses are 2+ bilaterally  ABDOMEN: Soft, nondistended, nontender to palpation, normoactive bowel sounds, no rebound/guarding  PSYCH: A+O to person, place, and time  NEUROLOGY: Facial expression appears symmetric, no gross sensory deficits appreciated, moving all extremities spontaneously  SKIN: No rashes; no palpable lesions    LABS:                        9.5    6.76  )-----------( 233      ( 10 Mar 2023 07:01 )             29.3     03-09    134<L>  |  102  |  18  ----------------------------<  246<H>  4.5   |  19<L>  |  0.98    Ca    9.7      09 Mar 2023 21:53    TPro  6.8  /  Alb  4.0  /  TBili  0.2  /  DBili  x   /  AST  25  /  ALT  36  /  AlkPhos  57  03-09      PT/INR - ( 09 Mar 2023 21:53 )   PT: 12.4 sec;   INR: 1.07 ratio         PTT - ( 09 Mar 2023 21:53 )  PTT:26.1 sec          SARS-CoV-2: NotDetec (29 Dec 2022 23:17)      RADIOLOGY & ADDITIONAL TESTS:  Imaging from Last 24 Hours: Medicine Progress Note      Patient is a 77y old  Male who presents with a chief complaint of PE (10 Mar 2023 00:16)      SUBJECTIVE / OVERNIGHT EVENTS: Admitted overnight with subsegmental and segmental PEs, also found to be COVID positive. This AM, patient seen and examined at bedside. Endorsed feeling OK, no difficulty breathing, no pain anywhere. Tolerating PO without issue.      MEDICATIONS  (STANDING):  atorvastatin 20 milliGRAM(s) Oral at bedtime  chlorhexidine 2% Cloths 1 Application(s) Topical daily  dextrose 5%. 1000 milliLiter(s) (100 mL/Hr) IV Continuous <Continuous>  dextrose 5%. 1000 milliLiter(s) (50 mL/Hr) IV Continuous <Continuous>  dextrose 50% Injectable 25 Gram(s) IV Push once  dextrose 50% Injectable 12.5 Gram(s) IV Push once  dextrose 50% Injectable 25 Gram(s) IV Push once  folic acid 1 milliGRAM(s) Oral daily  glucagon  Injectable 1 milliGRAM(s) IntraMuscular once  heparin  Infusion.  Unit(s)/Hr (12 mL/Hr) IV Continuous <Continuous>  insulin glargine Injectable (LANTUS) 10 Unit(s) SubCutaneous at bedtime  multivitamin 1 Tablet(s) Oral daily  pantoprazole    Tablet 40 milliGRAM(s) Oral before breakfast    MEDICATIONS  (PRN):  acetaminophen     Tablet .. 650 milliGRAM(s) Oral every 6 hours PRN Temp greater or equal to 38C (100.4F), Mild Pain (1 - 3)  aluminum hydroxide/magnesium hydroxide/simethicone Suspension 30 milliLiter(s) Oral every 4 hours PRN Dyspepsia  dextrose Oral Gel 15 Gram(s) Oral once PRN Blood Glucose LESS THAN 70 milliGRAM(s)/deciliter  melatonin 3 milliGRAM(s) Oral at bedtime PRN Insomnia  ondansetron Injectable 4 milliGRAM(s) IV Push every 8 hours PRN Nausea and/or Vomiting    CAPILLARY BLOOD GLUCOSE        I&O's Summary    09 Mar 2023 07:01  -  10 Mar 2023 07:00  --------------------------------------------------------  IN: 0 mL / OUT: 480 mL / NET: -480 mL        PHYSICAL EXAM:  Vital Signs Last 24 Hrs  T(C): 36.7 (10 Mar 2023 05:31), Max: 36.8 (09 Mar 2023 21:47)  T(F): 98.1 (10 Mar 2023 05:31), Max: 98.2 (09 Mar 2023 21:47)  HR: 77 (10 Mar 2023 05:31) (77 - 98)  BP: 118/68 (10 Mar 2023 05:31) (117/70 - 134/75)  BP(mean): --  RR: 18 (10 Mar 2023 05:31) (18 - 20)  SpO2: 100% (10 Mar 2023 05:31) (99% - 100%)    Parameters below as of 10 Mar 2023 05:31  Patient On (Oxygen Delivery Method): room air      CONSTITUTIONAL: NAD  HEENT: Moist oral mucosa, no pharyngeal injection or exudates  RESPIRATORY: Normal respiratory effort; lungs are clear to auscultation bilaterally  CARDIOVASCULAR: Regular rate and rhythm, normal S1 and S2, no murmur/rub/gallop, no lower extremity edema, peripheral pulses are palpable bilaterally  ABDOMEN: Soft, nondistended, nontender to palpation, normoactive bowel sounds, no rebound/guarding  PSYCH: A+O to person, place, and time  NEUROLOGY: Facial expression appears symmetric, no gross sensory deficits appreciated, moving all extremities spontaneously  SKIN: No rashes; no palpable lesions    LABS:                        9.5    6.76  )-----------( 233      ( 10 Mar 2023 07:01 )             29.3     03-09    134<L>  |  102  |  18  ----------------------------<  246<H>  4.5   |  19<L>  |  0.98    Ca    9.7      09 Mar 2023 21:53    TPro  6.8  /  Alb  4.0  /  TBili  0.2  /  DBili  x   /  AST  25  /  ALT  36  /  AlkPhos  57  03-09      PT/INR - ( 09 Mar 2023 21:53 )   PT: 12.4 sec;   INR: 1.07 ratio         PTT - ( 09 Mar 2023 21:53 )  PTT:26.1 sec          SARS-CoV-2: NotDetec (29 Dec 2022 23:17)      RADIOLOGY & ADDITIONAL TESTS:  Imaging from Last 24 Hours:

## 2023-03-10 NOTE — DISCHARGE NOTE PROVIDER - CARE PROVIDER_API CALL
Dilip Christopher (DO)  Cardiology; Internal Medicine; Nuclear Cardiology  [address] [facility] [address], [address]  [place] [address]  Phone: [phone]  Fax: [phone]  Established Patient  Follow Up Time: 1 week   Dilip Christopher (DO)  Cardiology; Internal Medicine; Nuclear Cardiology  [address] [facility] [address], [address]  [place] [address]  Phone: [phone]  Fax: [phone]  Established Patient  Follow Up Time: 1 week    Melinda Pereira  INTERNAL MEDICINE  [address] [place] [address]  Phone: [phone]  Fax: [phone]  Established Patient  Follow Up Time: 1 week    Pernell Sheets)  Surgery  [address] [place] [address], Division [place] [address]  Phone: [phone]  Fax: [phone]  Established Patient  Follow Up Time: 1 week

## 2023-03-10 NOTE — PROGRESS NOTE ADULT - PROBLEM SELECTOR PLAN 1
CT chest 3/9 w/ PE in lingula segment and RLL subsegmental branches. Heart size normal w/o effusion. No metastatic lesions identified in thorax. Patient HD stable, w/o hypoxia, tachycardia. Provoked PE in s/o active malignancy; also has covid-19 infection increasing likelihood of thromboembolic events. Megestrol can also increase risk of clots.   - f/u trop, BNP --> added on to initial labs  - f/u LE duplex to r/o DVT  - consider formal TTE; heart normal size on CT chest  - continue heparin gtt  - plan to transition to DOAC on discharge  - dc megestrol (taking for appetite stimulation) CT chest 3/9 w/ PE in lingula segment and RLL subsegmental branches. Heart size normal w/o effusion. No metastatic lesions identified in thorax. Patient HD stable, w/o hypoxia, tachycardia. Provoked PE in s/o active malignancy; also has covid-19 infection increasing likelihood of thromboembolic events. Megestrol can also increase risk of clots.   - Trop downtrend, BNP not elevated  - f/u LE duplex to r/o DVT  - Transition to eliquis in anticipation of DC today. Spoken to by pharmacy staff, patient agreeable to eliquis  - DC megestrol (taking for appetite stimulation). Will  patient to not resume taking

## 2023-03-10 NOTE — H&P ADULT - ASSESSMENT
77M w/ rectal cancer s/p radiation, FOLFOX (last 12/2022), HTN, DM presenting w/ segmental and subsegmental PEs incidentally discovered on outpatient imaging; HD stable on heparin gtt.

## 2023-03-10 NOTE — DISCHARGE NOTE PROVIDER - CARE PROVIDERS DIRECT ADDRESSES
[email or web address]

## 2023-03-10 NOTE — DISCHARGE NOTE PROVIDER - NSDCMRMEDTOKEN_GEN_ALL_CORE_FT
atorvastatin 20 mg oral tablet: 1 tab(s) orally once a day  Eliquis Starter Pack for Treatment of DVT and PE 5 mg oral tablet: 10mg twice a day for the first 7 days, then 5mg twice a day after  folic acid 1 mg oral tablet: 1 tab(s) orally once a day  glipiZIDE 10 mg oral tablet: 1 tab(s) orally once a day  megestrol 40 mg/mL oral suspension: 20 milliliter(s) orally once a day  metFORMIN 1000 mg oral tablet: 1 tab(s) orally 2 times a day  Multiple Vitamins oral tablet: 1 tab(s) orally once a day  pantoprazole 40 mg oral delayed release tablet: 1 tab(s) orally once a day  Tradjenta 5 mg oral tablet: 1 tab(s) orally once a day    atorvastatin 20 mg oral tablet: 1 tab(s) orally once a day  Eliquis Starter Pack for Treatment of DVT and PE 5 mg oral tablet: Please take 10 mg (2 tabs) orally 2 times a day for 7 days and then take 5 mg (1 tab) orally twice daily   folic acid 1 mg oral tablet: 1 tab(s) orally once a day  glipiZIDE 10 mg oral tablet: 1 tab(s) orally once a day  metFORMIN 1000 mg oral tablet: 1 tab(s) orally 2 times a day  Multiple Vitamins oral tablet: 1 tab(s) orally once a day  pantoprazole 40 mg oral delayed release tablet: 1 tab(s) orally once a day  Tradjenta 5 mg oral tablet: 1 tab(s) orally once a day

## 2023-03-10 NOTE — PROGRESS NOTE ADULT - PROBLEM SELECTOR PLAN 2
Patient completed FOLFOX adjuvant chemo 12/2022 and was next planned for possible surgical resection - had routine CT scans for staging, also completed radiation therapy.

## 2023-03-10 NOTE — H&P ADULT - PROBLEM SELECTOR PLAN 3
Home meds: metformin 1000mg BID, glipizide 10mg qd Home meds: metformin 1000mg BID, glipizide 10mg qd, tradjenta 5mg qd; last A1c 11.9  Lantus 10u qhs  Low ISS while hospitalized

## 2023-03-10 NOTE — H&P ADULT - PROBLEM SELECTOR PLAN 2
Patient completed FOLFOX adjuvant chemo this month and was next planned for possible surgical resection (had upcoming surgical appt for this month)  -also completed radiation therapy. Patient completed FOLFOX adjuvant chemo 12/2022 and was next planned for possible surgical resection - had routine CT scans for staging, also completed radiation therapy.

## 2023-03-10 NOTE — PROGRESS NOTE ADULT - PROBLEM SELECTOR PLAN 5
Diet: carb consistent  DVT: Heparin gtt  Dispo: coming from home, lives with family, walks w/ walker Diet: carb consistent  DVT: Heparin gtt  Dispo: coming from home, lives with family, walks w/ walker    DC today

## 2023-03-10 NOTE — H&P ADULT - NSHPPHYSICALEXAM_GEN_ALL_CORE
LOS: 1d    VITALS:   T(C): 36.8 (03-09-23 @ 21:47), Max: 36.8 (03-09-23 @ 21:47)  HR: 90 (03-09-23 @ 21:47) (90 - 98)  BP: 123/78 (03-09-23 @ 21:47) (123/78 - 134/75)  RR: 18 (03-09-23 @ 21:47) (18 - 19)  SpO2: 100% (03-09-23 @ 21:47) (100% - 100%)    GENERAL: NAD, lying in bed comfortably  HEAD:  Atraumatic, Normocephalic  EYES: EOMI, PERRLA, conjunctiva and sclera clear  ENT: Moist mucous membranes  NECK: Supple, No JVD  CHEST/LUNG: Clear to auscultation bilaterally; No rales, rhonchi, wheezing, or rubs. Unlabored respirations  HEART: Regular rate and rhythm; No murmurs, rubs, or gallops  ABDOMEN: BSx4; Soft, nontender, nondistended  EXTREMITIES:  2+ Peripheral Pulses, brisk capillary refill. No clubbing, cyanosis, or edema  NERVOUS SYSTEM:  A&Ox3, no focal deficits   SKIN: No rashes or lesions

## 2023-03-10 NOTE — H&P ADULT - HISTORY OF PRESENT ILLNESS
77M w/ rectal CA (last chemo 12/2022), HTN, HLD, DM2 sent to ED by surgeon for PE discovered incidentally on outpatient CT. Patient reports feeling well - denies CP, palpitations, SOB, abdominal pain, n/v, LE pain, swelling. Denies personal/family history of clotting or bleeding disorders.     ED vitals: 134/75, HR 98, % RA, afebrile  ED course: Started on heparin gtt.  77M w/ rectal CA (last chemo 12/2022), HTN, HLD, DM2 sent to ED by surgeon for PE discovered incidentally on outpatient CT. Patient reports feeling well - denies CP, palpitations, SOB, abdominal pain, n/v, LE pain, swelling, headache. Denies personal/family history of clotting or bleeding disorders. No changes to medications recently. No blood thinner use.   He began neoadjuvant radiation on 6/1/22 and completed treatment 6/7/2022. Patient was on total neoadjuvant therapy regimen with short course RT followed by 12-16 weeks of FOLFOX, last session 12/2022. Was having routine CT for restaging and planning for upcoming surgery.     ED vitals: 134/75, HR 98, % RA, afebrile  ED course: Started on heparin gtt.

## 2023-03-10 NOTE — DISCHARGE NOTE PROVIDER - NSDCFUSCHEDAPPT_GEN_ALL_CORE_FT
Hemal Ortiz  [facility]  ENDOCRIN [address]  Scheduled Appointment: 03/17/2023

## 2023-03-10 NOTE — PHARMACOTHERAPY INTERVENTION NOTE - COMMENTS
Counseled patient on the following inpatient/discharge medications names (brand/generic), indication, and possible side effects:  Eliquis starter pack - 10mg BID x 7 days till 3/16 then on 3/17 start 5mg BID    Patient was provided with a medication card for their new medication. Patient questions and concerns were answered and addressed. Patient demonstrated understanding.    Eliquis RX sent to [facility] for pricing - copay is $297 due to unmet deductible. Thereafter, copay will be $47/ month. First month free coupon applies. Discussed with patient and patient understands RX is at [facility], willing to take medication and is agreeable to he.     Marta Peralta, PharmD, [facility]  Clinical Pharmacy Specialist  [phone] or Teams

## 2023-03-10 NOTE — H&P ADULT - NSHPLABSRESULTS_GEN_ALL_CORE
.  LABS:                         10.1   7.45  )-----------( 220      ( 09 Mar 2023 21:53 )             30.7     03-09    134<L>  |  102  |  18  ----------------------------<  246<H>  4.5   |  19<L>  |  0.98    Ca    9.7      09 Mar 2023 21:53    TPro  6.8  /  Alb  4.0  /  TBili  0.2  /  DBili  x   /  AST  25  /  ALT  36  /  AlkPhos  57  03-09    PT/INR - ( 09 Mar 2023 21:53 )   PT: 12.4 sec;   INR: 1.07 ratio         PTT - ( 09 Mar 2023 21:53 )  PTT:26.1 sec          RADIOLOGY, EKG & ADDITIONAL TESTS: Reviewed.

## 2023-03-14 ENCOUNTER — APPOINTMENT (OUTPATIENT)
Dept: SURGICAL ONCOLOGY | Facility: CLINIC | Age: 78
End: 2023-03-14
Payer: MEDICARE

## 2023-03-14 PROCEDURE — 99214 OFFICE O/P EST MOD 30 MIN: CPT | Mod: 95

## 2023-03-17 ENCOUNTER — APPOINTMENT (OUTPATIENT)
Dept: ENDOCRINOLOGY | Facility: CLINIC | Age: 78
End: 2023-03-17
Payer: MEDICARE

## 2023-03-17 VITALS
SYSTOLIC BLOOD PRESSURE: 124 MMHG | HEART RATE: 99 BPM | BODY MASS INDEX: 24.11 KG/M2 | OXYGEN SATURATION: 99 % | HEIGHT: 66 IN | WEIGHT: 150 LBS | DIASTOLIC BLOOD PRESSURE: 70 MMHG | TEMPERATURE: 98.1 F

## 2023-03-17 LAB — GLUCOSE BLDC GLUCOMTR-MCNC: 259

## 2023-03-17 PROCEDURE — 82962 GLUCOSE BLOOD TEST: CPT

## 2023-03-17 PROCEDURE — 99214 OFFICE O/P EST MOD 30 MIN: CPT

## 2023-03-17 NOTE — PHYSICAL EXAM
[Alert] : alert [Well Nourished] : well nourished [No Acute Distress] : no acute distress [Well Developed] : well developed [Normal Sclera/Conjunctiva] : normal sclera/conjunctiva [EOMI] : extra ocular movement intact [No Proptosis] : no proptosis [Normal Oropharynx] : the oropharynx was normal [Thyroid Not Enlarged] : the thyroid was not enlarged [No Respiratory Distress] : no respiratory distress [No Accessory Muscle Use] : no accessory muscle use [Clear to Auscultation] : lungs were clear to auscultation bilaterally [Normal S1, S2] : normal S1 and S2 [Normal Rate] : heart rate was normal [Regular Rhythm] : with a regular rhythm [No Edema] : no peripheral edema [Pedal Pulses Normal] : the pedal pulses are present [Normal Bowel Sounds] : normal bowel sounds [Not Tender] : non-tender [Not Distended] : not distended [Soft] : abdomen soft [Normal Anterior Cervical Nodes] : no anterior cervical lymphadenopathy [No Spinal Tenderness] : no spinal tenderness [Spine Straight] : spine straight [No Stigmata of Cushings Syndrome] : no stigmata of Cushings Syndrome [Normal Gait] : normal gait [Normal Strength/Tone] : muscle strength and tone were normal [No Rash] : no rash [Acanthosis Nigricans] : no acanthosis nigricans [Normal Reflexes] : deep tendon reflexes were 2+ and symmetric [No Tremors] : no tremors [Oriented x3] : oriented to person, place, and time

## 2023-03-17 NOTE — HISTORY OF PRESENT ILLNESS
[FreeTextEntry1] : 77 yearM here for f/up for Type 2 diabetes mellitus\par \par Patient with PMHx as below, remarkable for : HTN, HLD, DM, prostate cancer, rectal cancer s/p chemo and RT\par \par  \par Screening \par Ophthalmology: follows \par Podiatry: follows\par LDL: on lipitor 40mg po daily  \par EGFR: 67\par \par \par Current diabetic medication regimen (verified with patient): \par glipizide 10mg po BID\par metformin 1g po bid \par \par \par \par SMBG ranges (glucometer): reported\par \par am: \par pm:  200'S\par \par this am POCT : 259\par \par \par \par \par \par

## 2023-03-27 ENCOUNTER — NON-APPOINTMENT (OUTPATIENT)
Age: 78
End: 2023-03-27

## 2023-03-27 ENCOUNTER — APPOINTMENT (OUTPATIENT)
Dept: CARDIOLOGY | Facility: CLINIC | Age: 78
End: 2023-03-27
Payer: MEDICARE

## 2023-03-27 VITALS
SYSTOLIC BLOOD PRESSURE: 108 MMHG | HEIGHT: 66 IN | WEIGHT: 150 LBS | HEART RATE: 97 BPM | BODY MASS INDEX: 24.11 KG/M2 | DIASTOLIC BLOOD PRESSURE: 70 MMHG | OXYGEN SATURATION: 98 %

## 2023-03-27 PROCEDURE — 99214 OFFICE O/P EST MOD 30 MIN: CPT

## 2023-03-27 PROCEDURE — 93000 ELECTROCARDIOGRAM COMPLETE: CPT

## 2023-03-28 NOTE — HISTORY OF PRESENT ILLNESS
[de-identified] : This visit was provided via telehealth using real-time 2-way audio visual technology. The patient, REBA WHITLOCK, was located at home [address] [place] [address] at the time of the visit. This provider was located at the clinic in [place], [place] at the time of the visit. The provider, patient participated in the telehealth encounter.  Verbal consent was given Mar 14 2023  1:30PM by the patient. \par \par Mr. REBA WHITLOCK is a 77 year old male who present today for follow up visit for rectal cancer. Referred by Dr. Pressley \par PMH: HTN, HLD, DM, prostate cancer \par Family History: breast cancer in sister \par \par  4/13/22: Denies abdominal pain, bloating, nausea/vomiting, bowel habit changes, hematochezia, black sticky stools, fever, chills, night sweats or weight loss. \par \par Endoscopy and Colonoscopy 3/23/22: Rectal mass pending final pathology \par \par EUS 4/26/22: T3 N0 \par  \par MRI pelvis 4/26/22: 3.0 cm right anterior mid to lower rectal tumor located 5.5 cm from the anal verge and 1.2 cm from the top anal sphincter. 6 mm extension beyond the muscularis propria along the right anterior wall reaching the circumferential resection margin. Stage T3 N0. CRM: involved. Sphincter: absent \par \par CT chest/abdomen/pelvis performed on 5/12/22 showed no evidence of metastatic disease.\par \par Patient presented at rectal tumor board.  He began neoadjuvant radiation on 6/1/22 under the care of Dr. Vieira, completed treatment 6/7/2022. Plans to have port placed & start FOLFOX with Dr. Daniel. \par Patient is on total neoadjuvant therapy regimen with short course RT followed by 12-16 weeks of FOLFOX, will restage after \par \par 1/4/23 states feeling very weak, after chemo therapy, he was admitted to [facility] due to dehydration. He has completed radiation and chemo therapy. Restaging imaging CT C/A/P & MR mass protocol now; completed chemotherapy with Dr. Daniel and radiation.\par Patient is in need of pre habitation prior to surgery referred to Dr. He for PT. He is current;y ECOG 1-2 \par RTO 2 weeks \par \par

## 2023-03-28 NOTE — CONSULT LETTER
[Dear  ___] : Dear  [unfilled], [Consult Letter:] : I had the pleasure of evaluating your patient, [unfilled]. [( Thank you for referring [unfilled] for consultation for _____ )] : Thank you for referring [unfilled] for consultation for [unfilled] [Please see my note below.] : Please see my note below. [Consult Closing:] : Thank you very much for allowing me to participate in the care of this patient.  If you have any questions, please do not hesitate to contact me. [Sincerely,] : Sincerely, [DrMichel  ___] : Dr. FORRESTER [DrMichel ___] : Dr. FORRESTER [FreeTextEntry2] : Trey Pressley MD [FreeTextEntry3] : Pernell Barcenas MD, FICS, FACS\par Director of Surgical Oncology- [facility] \par , Department of Surgery  \par [facility] [employer] [facility] \par [address]\[place] [address]\par \par [address]\[place] [address]\par \par [address]\[place] [address]\par \par (mob) [phone]\par (o) [phone]\par (f) [phone]\par \par

## 2023-03-28 NOTE — ASSESSMENT
[FreeTextEntry1] : IMP: 77 year old male w/ rectal cancer\par EUS: T3N0\par MRI pelvis 4/26/22: 3.0 cm right anterior mid to lower rectal tumor located 5.5 cm from the anal verge and 1.2 cm from the top anal sphincter. 6 mm extension beyond the muscularis propria along the right anterior wall reaching the circumferential resection margin. \par Stage T3c N0. CRM: involved. Sphincter involvement: absent \par \par Staging CT C/A/P- no evidence of metastatic disease\par \par S/p neoadjuvant RT concluded ~6/6/22- short course radiation  \par Patient completed neoadjuvant therapy regimen with short course RT followed by 12-16 weeks of FOLFOX.\par \par pre-op restaging imaging from 3/9/2023 showed incidental finding of segmental & subsegmental PE -- pt sent to ED. \par \par PLAN: \par Discussed with patient that given the new finding of a PE, I would personally prefer to defer surgery for future given risk of PE progression and significant bleeding risk on anticoagulation in the perioperative period and continue with neoadjuvant therapy until the PE resolves however, will represent his case at GI tumor board for group consensus. Patient in agreement.\par RTO via tele in 3 weeks to discuss \par \par I have discussed the diagnosis, therapeutic plan and options with the patient at length. Patient expressed verbal understanding to proceed with proposed plan. All questions answered. \par \par Addendum:\par Case presented at [facility] GI tumor board- consensus to hold off on surgery at this point\par Reassess for systemic therapy\par Repeat EUS to assess for treatment response\par RTO after EUS\par

## 2023-03-30 LAB
ALBUMIN SERPL ELPH-MCNC: 4.5 G/DL
ALP BLD-CCNC: 63 U/L
ALT SERPL-CCNC: 30 U/L
ANION GAP SERPL CALC-SCNC: 16 MMOL/L
APTT BLD: 31.4 SEC
AST SERPL-CCNC: 31 U/L
BILIRUB DIRECT SERPL-MCNC: 0.1 MG/DL
BILIRUB INDIRECT SERPL-MCNC: 0.1 MG/DL
BILIRUB SERPL-MCNC: 0.2 MG/DL
BUN SERPL-MCNC: 31 MG/DL
CALCIUM SERPL-MCNC: 10.7 MG/DL
CHLORIDE SERPL-SCNC: 101 MMOL/L
CHOLEST SERPL-MCNC: 162 MG/DL
CK SERPL-CCNC: 265 U/L
CO2 SERPL-SCNC: 20 MMOL/L
CREAT SERPL-MCNC: 1.02 MG/DL
EGFR: 76 ML/MIN/1.73M2
GLUCOSE SERPL-MCNC: 169 MG/DL
HCT VFR BLD CALC: 34.7 %
HDLC SERPL-MCNC: 56 MG/DL
HGB BLD-MCNC: 11 G/DL
INR PPP: 1.1 RATIO
LDLC SERPL CALC-MCNC: 93 MG/DL
LDLC SERPL DIRECT ASSAY-MCNC: 90 MG/DL
MAGNESIUM SERPL-MCNC: 1.4 MG/DL
MCHC RBC-ENTMCNC: 30.9 PG
MCHC RBC-ENTMCNC: 31.7 GM/DL
MCV RBC AUTO: 97.5 FL
NONHDLC SERPL-MCNC: 106 MG/DL
PHOSPHATE SERPL-MCNC: 3.7 MG/DL
PLATELET # BLD AUTO: 255 K/UL
POTASSIUM SERPL-SCNC: 4.4 MMOL/L
PROT SERPL-MCNC: 7.3 G/DL
PT BLD: 12.8 SEC
RBC # BLD: 3.56 M/UL
RBC # FLD: 12.2 %
SODIUM SERPL-SCNC: 137 MMOL/L
TRIGL SERPL-MCNC: 66 MG/DL
WBC # FLD AUTO: 5.76 K/UL

## 2023-03-30 NOTE — PHYSICAL EXAM
[Well Developed] : well developed [Well Nourished] : well nourished [No Acute Distress] : no acute distress [Normal Conjunctiva] : normal conjunctiva [Normal Venous Pressure] : normal venous pressure [No Carotid Bruit] : no carotid bruit [Clear Lung Fields] : clear lung fields [Good Air Entry] : good air entry [No Respiratory Distress] : no respiratory distress  [Soft] : abdomen soft [Non Tender] : non-tender [No Masses/organomegaly] : no masses/organomegaly [Normal Bowel Sounds] : normal bowel sounds [Normal Gait] : normal gait [No Edema] : no edema [No Cyanosis] : no cyanosis [No Clubbing] : no clubbing [No Varicosities] : no varicosities [No Rash] : no rash [No Skin Lesions] : no skin lesions [Moves all extremities] : moves all extremities [No Focal Deficits] : no focal deficits [Normal Speech] : normal speech [Alert and Oriented] : alert and oriented [Normal memory] : normal memory [de-identified] : Regular rate and rhythm, NL S1, S2, non-displaced PMI, chest non-tender; no rubs,heaves  or gallops a  Grade 2/6 systolic murmur noted at the LSB

## 2023-03-30 NOTE — HISTORY OF PRESENT ILLNESS
[FreeTextEntry1] : REBA WHITLOCK  is a 77 year M  w/ pmhx of HTN, HLD, DM, Prostate Ca, and Rectal Ca (followed by Dr. Marion / Dr. Vieira) who presents today for follow up s/p hospitalization s/p incidental finding of PE on 3/9/2023. Pt was started on Eliquis. The patient denies fever, chills, sore throat, loss of taste or smell, muscle aches weight loss, malaise, rash, recent travel, insect bites, alteration bowel habits, headaches, weakness, abdominal  pain, bloating, changes in urination, visual disturbances, shortness of breath, chest pain, dizziness, palpitations.\par \par The patient here for evaluation of high blood pressure. Patient is currently tolerating the current antihypertensive regime and they deny headaches, stiff neck, visual changes, or PND. The patient has been trying to stay on a low-sodium diet.\par \par The patient is here for follow-up of elevated cholesterol. Patient is currently tolerating medication and denies muscle pain, joint pain, back pain,  urinary changes , nausea, vomiting, abdominal pain or diarrhea. The patient is trying to follow a low cholesterol diet.\par \par The patient also presents for continued care of diabetes mellitus. Patient is following the diabetic regimen. Patient is tolerating hypoglycemic agents and following the diet. Patient denies any visual changes, blood in the urine, abdominal pain, nausea, vomiting, myalgias, arthralgias, paresthesia’s and syncope. The patient denies any chest discomfort, shortness of breath or new neurologic signs or symptoms. Patient denies any polyuria, worsening nocturia, or polydipsia.\par \par

## 2023-03-31 LAB
CALCIUM SERPL-MCNC: 10.5 MG/DL
PARATHYROID HORMONE INTACT: 23 PG/ML

## 2023-04-12 ENCOUNTER — APPOINTMENT (OUTPATIENT)
Dept: GASTROENTEROLOGY | Facility: CLINIC | Age: 78
End: 2023-04-12
Payer: MEDICARE

## 2023-04-12 PROCEDURE — 99213 OFFICE O/P EST LOW 20 MIN: CPT

## 2023-04-12 NOTE — PHYSICAL EXAM

## 2023-04-14 NOTE — ASSESSMENT
Patient will be due for annual follow-up visit with me in July. She can continue to follow with me for yearly visits but will not need to establish care with another allergist in order to continue with treatment in [place].   [FreeTextEntry1] : This is a 77 year old male here of for an restaging of rectal cancer. \par \par Plan \par Colonoscopy\par Lower EUS (golytely prep) for restaging of his rectal cancer.   \par Do not stop his anticoagulation bc had recent PE, he can continue it up to the procedure (just hold it the morning of the procedure, then can resume immediately afterwards once procedure is done). \par \par Risks, benefits, and alternatives of EUS/colonoscopy discussed at length with patient including but not limited to bleeding (especially in light of eliquis), perforation, anesthesia related complication, aspiration, etc. Patient expressed understanding.\par \par Colonoscopy for evaluation of polyps, tumors, nodules with +/- biopsy and or cauterization w/ and or w/o clipping. \par

## 2023-04-14 NOTE — HISTORY OF PRESENT ILLNESS
[FreeTextEntry1] : This is a 77 year old male here of for an restaging of rectal cancer. Referred by Dr. Barcenas. PMH of PE on Eliquis, HTN, HLD, DM, prostate cancer. Endoscopy and Colonoscopy 3/23/22: Rectal mass and pathology with adenocarcinoma. MRI pelvis 4/26/22: 3.0 cm right anterior mid to lower rectal tumor located 5.5 cm from the anal verge and 1.2 cm from the top anal sphincter. 6 mm extension beyond the muscularis propria along the right anterior wall reaching the circumferential resection margin.  He began neoadjuvant radiation on 6/1/22 under the care of Dr. Vieira, completed treatment 6/7/2022. Plans s/p FOLFOX with Dr. Daniel. Patient is on total neoadjuvant therapy regimen with short course RT followed by 12-16 weeks of FOLFOX. Currently he feels weak but walks around with a walker. No N&V or abdominal pain. No blood or dark tarry stools. Up 7 pounds since January 2023.  \par Medication: Eliquis \par \par Plan \par lower EUS (golytely prep) for restaging of his rectal cancer.   \par Do not stop his anticoagulation bc had recent PE, he can continue it up to the procedure (just hold it the morning of the procedure, then can resume immediately afterwards once procedure is done).

## 2023-04-14 NOTE — REASON FOR VISIT
[Home] : at home, [unfilled] , at the time of the visit. [Medical Office: ([facility])___] : at the medical office located in  [This encounter was initiated by telehealth (audio with video) and converted to telephone (audio only) due to technical difficulties.] : This encounter was initiated by telehealth (audio with video) and converted to telephone (audio only) due to technical difficulties. [FreeTextEntry2] : Sally Galo [FreeTextEntry3] : grand daughter

## 2023-04-18 ENCOUNTER — APPOINTMENT (OUTPATIENT)
Dept: PULMONOLOGY | Facility: CLINIC | Age: 78
End: 2023-04-18
Payer: MEDICARE

## 2023-04-18 VITALS — OXYGEN SATURATION: 100 % | SYSTOLIC BLOOD PRESSURE: 145 MMHG | DIASTOLIC BLOOD PRESSURE: 72 MMHG | HEART RATE: 100 BPM

## 2023-04-18 VITALS — DIASTOLIC BLOOD PRESSURE: 78 MMHG | SYSTOLIC BLOOD PRESSURE: 135 MMHG

## 2023-04-18 PROCEDURE — 99204 OFFICE O/P NEW MOD 45 MIN: CPT | Mod: 25

## 2023-04-18 PROCEDURE — 94010 BREATHING CAPACITY TEST: CPT

## 2023-04-18 PROCEDURE — 94727 GAS DIL/WSHOT DETER LNG VOL: CPT

## 2023-04-18 PROCEDURE — ZZZZZ: CPT

## 2023-04-18 PROCEDURE — 94729 DIFFUSING CAPACITY: CPT

## 2023-04-18 NOTE — CONSULT LETTER
[FreeTextEntry1] : Dear ,\par \par I had the pleasure of evaluating your patient, REBA WHITLOCK today in pulmonary consultation.  Please refer to my attached note for my findings and recommendations.\par \par \par Thank you for allowing me to participate in the care of your patient, please feel free to call with any questions or concerns.\par \par \par Sincerely,\par \par Teresita Williamson MD\par [facility] \par [facility]\par \par

## 2023-04-18 NOTE — HISTORY OF PRESENT ILLNESS
[Never] : never [TextBox_4] : 77M with HTN, HLD, DM,prostate ca, rectal ca, recent diagnosis on cta here for pulmonary evaluation.\par \par Patient denies any prior pulmonary history or blood clotting history.  He is currently on eliquis twice daily.  He denies shortness of breath, chest pains, cough or wheeze.  Never smoker.

## 2023-04-18 NOTE — PROCEDURE
[FreeTextEntry1] : PFT: Normal spirometry.  Lung volumes normal. DLCO normal.\par \par Reviewed:\par \par Report date: 3/9/2023 \par AM: [id number] - CT ABDOMEN AND PELVIS IC - ORDERED BY: JOSE LORA\par \par EXAM: [id number] - CT CHEST IC - ORDERED BY: JOSE LORA\par \par \par PROCEDURE DATE: 03/09/2023\par \par \par \par INTERPRETATION: CLINICAL INFORMATION: 77-year-old man with rectal cancer\par \par COMPARISON: CT 05/12/2022\par \par CONTRAST/COMPLICATIONS:\par IV Contrast: Omnipaque 350 90 cc administered 10 cc discarded\par Oral Contrast: Smoothie Readi-Cat 2\par Complications: None reported at time of study completion\par \par PROCEDURE:\par CT of the Chest, Abdomen and Pelvis was performed.\par Sagittal and coronal reformats were performed.\par \par FINDINGS:\par CHEST:\par LUNGS AND LARGE AIRWAYS: Patent central airways. No pulmonary nodules.\par PLEURA: No pleural effusion.\par VESSELS: Pulmonary emboli lingula segmental and right lower lobe subsegmental branches.\par HEART: Heart size is normal. No pericardial effusion. Coronary artery calcification\par MEDIASTINUM AND OMAR: No lymphadenopathy.\par CHEST WALL AND LOWER NECK: Mediport right chest wall with catheter ending at cavoatrial junction.\par \par ABDOMEN AND PELVIS:\par LIVER: Within normal limits.\par BILE DUCTS: Normal caliber.\par GALLBLADDER: Within normal limits.\par SPLEEN: Within normal limits.\par PANCREAS: Within normal limits.\par ADRENALS: Within normal limits.\par KIDNEYS/URETERS: Bilateral cysts and subcentimeter hypodensities.\par \par BLADDER: Within normal limits.\par REPRODUCTIVE ORGANS: Normal size prostate with radiation seeds.\par \par BOWEL: No bowel obstruction. Perirectal soft tissue stranding and mild mural thickening. Appendix not visualized.\par PERITONEUM: No ascites. Unchanged omental calcification.\par VESSELS: Within normal limits.\par RETROPERITONEUM/LYMPH NODES: No lymphadenopathy.\par ABDOMINAL WALL: Within normal limits.\par BONES: Degenerative changes.\par \par IMPRESSION:\par Mild perirectal soft tissue stranding and mural thickening.\par No metastatic lesions identified.\par Segmental and subsegmental pulmonary emboli\par \par Findings discussed with Dr. Lora on 03/09/2023 at 7:00 PM with read back. There is\par \par --- End of Report ---\par \par \par

## 2023-05-11 ENCOUNTER — APPOINTMENT (OUTPATIENT)
Dept: GASTROENTEROLOGY | Facility: HOSPITAL | Age: 78
End: 2023-05-11

## 2023-05-11 ENCOUNTER — RESULT REVIEW (OUTPATIENT)
Age: 78
End: 2023-05-11

## 2023-05-11 ENCOUNTER — OUTPATIENT (OUTPATIENT)
Dept: OUTPATIENT SERVICES | Facility: HOSPITAL | Age: 78
LOS: 1 days | End: 2023-05-11
Payer: MEDICARE

## 2023-05-11 ENCOUNTER — NON-APPOINTMENT (OUTPATIENT)
Age: 78
End: 2023-05-11

## 2023-05-11 VITALS
RESPIRATION RATE: 16 BRPM | OXYGEN SATURATION: 99 % | DIASTOLIC BLOOD PRESSURE: 73 MMHG | HEART RATE: 82 BPM | HEIGHT: 66 IN | WEIGHT: 156.09 LBS | TEMPERATURE: 98 F | SYSTOLIC BLOOD PRESSURE: 155 MMHG

## 2023-05-11 VITALS
HEART RATE: 83 BPM | DIASTOLIC BLOOD PRESSURE: 75 MMHG | SYSTOLIC BLOOD PRESSURE: 153 MMHG | OXYGEN SATURATION: 100 % | RESPIRATION RATE: 19 BRPM

## 2023-05-11 DIAGNOSIS — Z98.890 OTHER SPECIFIED POSTPROCEDURAL STATES: Chronic | ICD-10-CM

## 2023-05-11 DIAGNOSIS — C20 MALIGNANT NEOPLASM OF RECTUM: ICD-10-CM

## 2023-05-11 PROCEDURE — 88305 TISSUE EXAM BY PATHOLOGIST: CPT

## 2023-05-11 PROCEDURE — 45331 SIGMOIDOSCOPY AND BIOPSY: CPT

## 2023-05-11 PROCEDURE — 45341 SIGMOIDOSCOPY W/ULTRASOUND: CPT

## 2023-05-11 PROCEDURE — 45380 COLONOSCOPY AND BIOPSY: CPT

## 2023-05-11 PROCEDURE — 88305 TISSUE EXAM BY PATHOLOGIST: CPT | Mod: 26

## 2023-05-11 NOTE — ASU PATIENT PROFILE, ADULT - NSICDXPASTMEDICALHX_GEN_ALL_CORE_FT
PAST MEDICAL HISTORY:  Colon cancer Rectal on Chemo    DM (diabetes mellitus)     HLD (hyperlipidemia)     Hypercholesterolemia     Prostate CA seed placement    Pulmonary embolism

## 2023-05-12 ENCOUNTER — APPOINTMENT (OUTPATIENT)
Dept: ENDOCRINOLOGY | Facility: CLINIC | Age: 78
End: 2023-05-12
Payer: MEDICARE

## 2023-05-12 VITALS
HEART RATE: 83 BPM | OXYGEN SATURATION: 99 % | TEMPERATURE: 98.1 F | BODY MASS INDEX: 26.23 KG/M2 | SYSTOLIC BLOOD PRESSURE: 132 MMHG | WEIGHT: 163.19 LBS | HEIGHT: 66 IN | DIASTOLIC BLOOD PRESSURE: 70 MMHG

## 2023-05-12 PROBLEM — C18.9 MALIGNANT NEOPLASM OF COLON, UNSPECIFIED: Chronic | Status: ACTIVE | Noted: 2022-12-29

## 2023-05-12 PROBLEM — C61 MALIGNANT NEOPLASM OF PROSTATE: Chronic | Status: ACTIVE | Noted: 2023-05-11

## 2023-05-12 PROBLEM — I26.99 OTHER PULMONARY EMBOLISM WITHOUT ACUTE COR PULMONALE: Chronic | Status: ACTIVE | Noted: 2023-05-11

## 2023-05-12 PROBLEM — E78.00 PURE HYPERCHOLESTEROLEMIA, UNSPECIFIED: Chronic | Status: ACTIVE | Noted: 2023-05-11

## 2023-05-12 LAB
GLUCOSE BLDC GLUCOMTR-MCNC: 167
HBA1C MFR BLD HPLC: 9.2

## 2023-05-12 PROCEDURE — 82962 GLUCOSE BLOOD TEST: CPT

## 2023-05-12 PROCEDURE — 99214 OFFICE O/P EST MOD 30 MIN: CPT

## 2023-05-12 RX ORDER — EMPAGLIFLOZIN 10 MG/1
10 TABLET, FILM COATED ORAL DAILY
Qty: 30 | Refills: 3 | Status: DISCONTINUED | COMMUNITY
Start: 2023-03-17 | End: 2023-05-12

## 2023-05-12 NOTE — HISTORY OF PRESENT ILLNESS
[FreeTextEntry1] : 77 yearM here for f/up for Type 2 diabetes mellitus\par \par Patient with PMHx as below, remarkable for : HTN, HLD, DM, prostate cancer, rectal cancer s/p chemo and RT\par \par  \par Screening \par Ophthalmology: follows \par Podiatry: follows\par LDL: on lipitor 40mg po daily  \par EGFR: 67\par micro-alb: started on SGLT-2 I\par \par \par Current diabetic medication regimen (verified with patient): \par glipizide 10mg po BID\par metformin 1g po bid \par \par Did not get jardiance due to insurance issues\par \par \par \par SMBG ranges (glucometer): reported\par \par am: 72- 164\par pm:  200's, infrequent checks \par \par \par \par Hypoglycemia: level 1 x 1 episode, ate small dinner prior\par \par \par \par

## 2023-05-15 LAB — SURGICAL PATHOLOGY STUDY: SIGNIFICANT CHANGE UP

## 2023-06-13 ENCOUNTER — APPOINTMENT (OUTPATIENT)
Dept: PULMONOLOGY | Facility: CLINIC | Age: 78
End: 2023-06-13
Payer: MEDICARE

## 2023-06-13 ENCOUNTER — APPOINTMENT (OUTPATIENT)
Dept: SURGICAL ONCOLOGY | Facility: CLINIC | Age: 78
End: 2023-06-13
Payer: MEDICARE

## 2023-06-13 VITALS — HEART RATE: 79 BPM | SYSTOLIC BLOOD PRESSURE: 151 MMHG | OXYGEN SATURATION: 98 % | DIASTOLIC BLOOD PRESSURE: 75 MMHG

## 2023-06-13 VITALS
SYSTOLIC BLOOD PRESSURE: 157 MMHG | HEART RATE: 90 BPM | RESPIRATION RATE: 16 BRPM | OXYGEN SATURATION: 98 % | HEIGHT: 66 IN | WEIGHT: 171 LBS | BODY MASS INDEX: 27.48 KG/M2 | DIASTOLIC BLOOD PRESSURE: 77 MMHG

## 2023-06-13 VITALS — SYSTOLIC BLOOD PRESSURE: 144 MMHG | DIASTOLIC BLOOD PRESSURE: 82 MMHG

## 2023-06-13 DIAGNOSIS — I26.99 OTHER PULMONARY EMBOLISM W/OUT ACUTE COR PULMONALE: ICD-10-CM

## 2023-06-13 PROCEDURE — 99213 OFFICE O/P EST LOW 20 MIN: CPT

## 2023-06-13 PROCEDURE — 99214 OFFICE O/P EST MOD 30 MIN: CPT

## 2023-06-13 NOTE — PROCEDURE
[FreeTextEntry1] : Reviewed:\par \par \par PFT: Normal spirometry.  Lung volumes normal. DLCO normal.\par \par \par Report date: 3/9/2023 \par AM: [id number] - CT ABDOMEN AND PELVIS IC - ORDERED BY: JOSE LORA\par \par EXAM: [id number] - CT CHEST IC - ORDERED BY: JOSE LORA\par \par \par PROCEDURE DATE: 03/09/2023\par \par \par \par INTERPRETATION: CLINICAL INFORMATION: 77-year-old man with rectal cancer\par \par COMPARISON: CT 05/12/2022\par \par CONTRAST/COMPLICATIONS:\par IV Contrast: Omnipaque 350 90 cc administered 10 cc discarded\par Oral Contrast: Smoothie Readi-Cat 2\par Complications: None reported at time of study completion\par \par PROCEDURE:\par CT of the Chest, Abdomen and Pelvis was performed.\par Sagittal and coronal reformats were performed.\par \par FINDINGS:\par CHEST:\par LUNGS AND LARGE AIRWAYS: Patent central airways. No pulmonary nodules.\par PLEURA: No pleural effusion.\par VESSELS: Pulmonary emboli lingula segmental and right lower lobe subsegmental branches.\par HEART: Heart size is normal. No pericardial effusion. Coronary artery calcification\par MEDIASTINUM AND OMAR: No lymphadenopathy.\par CHEST WALL AND LOWER NECK: Mediport right chest wall with catheter ending at cavoatrial junction.\par \par ABDOMEN AND PELVIS:\par LIVER: Within normal limits.\par BILE DUCTS: Normal caliber.\par GALLBLADDER: Within normal limits.\par SPLEEN: Within normal limits.\par PANCREAS: Within normal limits.\par ADRENALS: Within normal limits.\par KIDNEYS/URETERS: Bilateral cysts and subcentimeter hypodensities.\par \par BLADDER: Within normal limits.\par REPRODUCTIVE ORGANS: Normal size prostate with radiation seeds.\par \par BOWEL: No bowel obstruction. Perirectal soft tissue stranding and mild mural thickening. Appendix not visualized.\par PERITONEUM: No ascites. Unchanged omental calcification.\par VESSELS: Within normal limits.\par RETROPERITONEUM/LYMPH NODES: No lymphadenopathy.\par ABDOMINAL WALL: Within normal limits.\par BONES: Degenerative changes.\par \par IMPRESSION:\par Mild perirectal soft tissue stranding and mural thickening.\par No metastatic lesions identified.\par Segmental and subsegmental pulmonary emboli\par \par Findings discussed with Dr. Lora on 03/09/2023 at 7:00 PM with read back. There is\par \par --- End of Report ---\par \par \par

## 2023-06-13 NOTE — ASSESSMENT
[FreeTextEntry1] : obtain repeat cta now to see if clots has resolved, if clots resolved can safely stop ac for now for any required surgical procedure related to his malignancy.  \par \par will fu with onc/surgery

## 2023-06-13 NOTE — HISTORY OF PRESENT ILLNESS
[Never] : never [TextBox_4] : PE on eliquis\par has been 3 mo now\par may need surgery for rectal ca\par no dyspnea/cp etc

## 2023-06-14 LAB
ANION GAP SERPL CALC-SCNC: 14 MMOL/L
BUN SERPL-MCNC: 18 MG/DL
CALCIUM SERPL-MCNC: 9.3 MG/DL
CHLORIDE SERPL-SCNC: 105 MMOL/L
CO2 SERPL-SCNC: 20 MMOL/L
CREAT SERPL-MCNC: 1.12 MG/DL
EGFR: 68 ML/MIN/1.73M2
GLUCOSE SERPL-MCNC: 263 MG/DL
POTASSIUM SERPL-SCNC: 4.4 MMOL/L
SODIUM SERPL-SCNC: 140 MMOL/L

## 2023-06-16 NOTE — ED PROVIDER NOTE - BIRTH SEX
Male Sarecycline Pregnancy And Lactation Text: This medication is Pregnancy Category D and not consider safe during pregnancy. It is also excreted in breast milk.

## 2023-06-22 ENCOUNTER — OUTPATIENT (OUTPATIENT)
Dept: OUTPATIENT SERVICES | Facility: HOSPITAL | Age: 78
LOS: 1 days | End: 2023-06-22
Payer: MEDICARE

## 2023-06-22 ENCOUNTER — APPOINTMENT (OUTPATIENT)
Dept: CT IMAGING | Facility: CLINIC | Age: 78
End: 2023-06-22
Payer: MEDICARE

## 2023-06-22 DIAGNOSIS — Z98.890 OTHER SPECIFIED POSTPROCEDURAL STATES: Chronic | ICD-10-CM

## 2023-06-22 DIAGNOSIS — I26.99 OTHER PULMONARY EMBOLISM WITHOUT ACUTE COR PULMONALE: ICD-10-CM

## 2023-06-22 PROCEDURE — 71275 CT ANGIOGRAPHY CHEST: CPT

## 2023-06-22 PROCEDURE — 71275 CT ANGIOGRAPHY CHEST: CPT | Mod: 26

## 2023-06-26 ENCOUNTER — APPOINTMENT (OUTPATIENT)
Dept: SURGICAL ONCOLOGY | Facility: CLINIC | Age: 78
End: 2023-06-26
Payer: MEDICARE

## 2023-06-26 VITALS
OXYGEN SATURATION: 99 % | DIASTOLIC BLOOD PRESSURE: 75 MMHG | WEIGHT: 173 LBS | SYSTOLIC BLOOD PRESSURE: 170 MMHG | HEIGHT: 66 IN | HEART RATE: 84 BPM | BODY MASS INDEX: 27.8 KG/M2 | RESPIRATION RATE: 17 BRPM

## 2023-06-26 PROCEDURE — 99215 OFFICE O/P EST HI 40 MIN: CPT

## 2023-06-26 PROCEDURE — 99205 OFFICE O/P NEW HI 60 MIN: CPT

## 2023-06-26 NOTE — ASSESSMENT
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

## 2023-06-26 NOTE — CONSULT LETTER
[Dear  ___] : Dear ~ROXANN, [FreeTextEntry2] : Pernell Barcenas MD [FreeTextEntry1] : I will keep you informed of the final pathology. [FreeTextEntry3] : Aldo Durant MD FACS\par Chief of Surgical Oncology  [DrMcihel  ___] : Dr. FORRESTER

## 2023-06-26 NOTE — HISTORY OF PRESENT ILLNESS
[de-identified] : Mr. REBA WHITLOCK is a 78 year old man, referred by my colleague Dr. Barcenas, here today for an initial consultation. \par here w/ his granddaughter, Laine\par \par HX is significant for rectal cancer, T3 N0, diagnosed in 3/2022\par s/p neoadjuvant FOLFOX, repeat EUS 5/2023 showed complete response, no surgery at this time \par restaging CT C/A/P 3/2023 showed incidental PE -- currently on eliquis \par \par in the interim he had a biopsy of a skin lesion to left plantar mole on 2/20/2023, present for many years, biopsies by podiatry with path:\par malignant melanoma in situ, extending to superficial peripheral surfaces \par -- reports this area was biopsied ~15 years ago, is unsure of the pathology at that time but it has grown larger since then \par Has never seen a dermatologist \par \par FamHX of sister with breast cancer and another sister with colon cancer

## 2023-06-26 NOTE — PHYSICAL EXAM
[Normal] : supple, no neck mass and thyroid not enlarged [Normal Groin Lymph Nodes] : normal groin lymph nodes [Normal] : oriented to person, place and time, with appropriate affect [de-identified] : Normal popliteal nodes [de-identified] : 3 cm x 1 cm pigmented area on plantar aspect of left foot laterally

## 2023-06-27 ENCOUNTER — NON-APPOINTMENT (OUTPATIENT)
Age: 78
End: 2023-06-27

## 2023-06-27 ENCOUNTER — APPOINTMENT (OUTPATIENT)
Dept: CARDIOLOGY | Facility: CLINIC | Age: 78
End: 2023-06-27
Payer: MEDICARE

## 2023-06-27 VITALS
BODY MASS INDEX: 27.92 KG/M2 | HEART RATE: 83 BPM | DIASTOLIC BLOOD PRESSURE: 80 MMHG | SYSTOLIC BLOOD PRESSURE: 140 MMHG | WEIGHT: 173 LBS | TEMPERATURE: 97.6 F | OXYGEN SATURATION: 99 %

## 2023-06-27 DIAGNOSIS — H35.00 UNSPECIFIED BACKGROUND RETINOPATHY: ICD-10-CM

## 2023-06-27 DIAGNOSIS — Z01.818 ENCOUNTER FOR OTHER PREPROCEDURAL EXAMINATION: ICD-10-CM

## 2023-06-27 PROCEDURE — 93000 ELECTROCARDIOGRAM COMPLETE: CPT

## 2023-06-27 PROCEDURE — 99214 OFFICE O/P EST MOD 30 MIN: CPT

## 2023-06-27 NOTE — HISTORY OF PRESENT ILLNESS
[FreeTextEntry1] : REBA WHITLOCK is a 77 year M w/ pmhx of HTN, HLD, DM, Prostate Ca, and Rectal Ca (followed by Dr. Marion / Dr. Vieira)  who presents today for routine follow up. The patient denies fever, chills, sore throat, loss of taste or smell, muscle aches weight loss, malaise, rash, recent travel, insect bites, alteration bowel habits, headaches, weakness, abdominal  pain, bloating, changes in urination, visual disturbances, shortness of breath, chest pain, dizziness, palpitations.\par \par The patient here for evaluation of high blood pressure. Patient is currently tolerating the current antihypertensive regime and they deny headaches, stiff neck, visual changes, or PND. The patient has been trying to stay on a low-sodium diet.\par \par The patient is here for follow-up of elevated cholesterol. Patient is currently tolerating medication and denies muscle pain, joint pain, back pain,  urinary changes , nausea, vomiting, abdominal pain or diarrhea. The patient is trying to follow a low cholesterol diet.\par \par The patient also presents for continued care of diabetes mellitus. Patient is following the diabetic regimen. Patient is tolerating hypoglycemic agents and following the diet. Patient denies any visual changes, blood in the urine, abdominal pain, nausea, vomiting, myalgias, arthralgias, paresthesia’s and syncope. The patient denies any chest discomfort, shortness of breath or new neurologic signs or symptoms. Patient denies any polyuria, worsening nocturia, or polydipsia.\par \par \par I was asked to see this delightful patient today for Pre-op Evaluation  prior to  _lesion removal____  with    ___Aldo Durant__  at fax number   ___[phone]____  .  The patient denies fever, cough, wheezing, sputum production, hemoptysis, dyspnea, congestion, diarrhea, constipation, nausea, vomiting, bright red blood per rectum, melena, angina, chest pain, palpitations, diaphoresis, PND, incontinence, frequency, urgency, dysuria, edema, joint pain, headache, weakness, numbness and dizziness. The date of the planned procedure is: _____TBS______\par \par

## 2023-06-29 ENCOUNTER — OUTPATIENT (OUTPATIENT)
Dept: OUTPATIENT SERVICES | Facility: HOSPITAL | Age: 78
LOS: 1 days | End: 2023-06-29
Payer: MEDICARE

## 2023-06-29 ENCOUNTER — RESULT REVIEW (OUTPATIENT)
Age: 78
End: 2023-06-29

## 2023-06-29 DIAGNOSIS — Z98.890 OTHER SPECIFIED POSTPROCEDURAL STATES: Chronic | ICD-10-CM

## 2023-06-29 DIAGNOSIS — C80.1 MALIGNANT (PRIMARY) NEOPLASM, UNSPECIFIED: ICD-10-CM

## 2023-06-29 PROCEDURE — 88321 CONSLTJ&REPRT SLD PREP ELSWR: CPT

## 2023-06-30 LAB
ALBUMIN SERPL ELPH-MCNC: 4.5 G/DL
ALP BLD-CCNC: 95 U/L
ALT SERPL-CCNC: 34 U/L
ANION GAP SERPL CALC-SCNC: 16 MMOL/L
APO B SERPL-MCNC: 83 MG/DL
AST SERPL-CCNC: 38 U/L
BILIRUB DIRECT SERPL-MCNC: 0.1 MG/DL
BILIRUB INDIRECT SERPL-MCNC: NORMAL MG/DL
BILIRUB SERPL-MCNC: 0.2 MG/DL
BUN SERPL-MCNC: 19 MG/DL
CALCIUM SERPL-MCNC: 9.7 MG/DL
CHLORIDE SERPL-SCNC: 101 MMOL/L
CHOLEST SERPL-MCNC: 190 MG/DL
CK SERPL-CCNC: 519 U/L
CO2 SERPL-SCNC: 22 MMOL/L
CREAT SERPL-MCNC: 1.08 MG/DL
CRP SERPL HS-MCNC: 0.82 MG/L
EGFR: 70 ML/MIN/1.73M2
ESTIMATED AVERAGE GLUCOSE: 177 MG/DL
GLUCOSE SERPL-MCNC: 219 MG/DL
HBA1C MFR BLD HPLC: 7.8 %
HDLC SERPL-MCNC: 70 MG/DL
LDLC SERPL CALC-MCNC: 87 MG/DL
LDLC SERPL DIRECT ASSAY-MCNC: 81 MG/DL
NONHDLC SERPL-MCNC: 120 MG/DL
POTASSIUM SERPL-SCNC: 4.8 MMOL/L
PROT SERPL-MCNC: 7.3 G/DL
SODIUM SERPL-SCNC: 139 MMOL/L
TRIGL SERPL-MCNC: 165 MG/DL

## 2023-07-03 ENCOUNTER — RX RENEWAL (OUTPATIENT)
Age: 78
End: 2023-07-03

## 2023-07-05 ENCOUNTER — RX RENEWAL (OUTPATIENT)
Age: 78
End: 2023-07-05

## 2023-07-05 LAB — SURGICAL PATHOLOGY STUDY: SIGNIFICANT CHANGE UP

## 2023-07-07 LAB — CK SERPL-CCNC: 482 U/L

## 2023-07-11 ENCOUNTER — OUTPATIENT (OUTPATIENT)
Dept: OUTPATIENT SERVICES | Facility: HOSPITAL | Age: 78
LOS: 1 days | End: 2023-07-11

## 2023-07-11 VITALS
SYSTOLIC BLOOD PRESSURE: 140 MMHG | HEIGHT: 64.5 IN | DIASTOLIC BLOOD PRESSURE: 72 MMHG | OXYGEN SATURATION: 98 % | RESPIRATION RATE: 16 BRPM | WEIGHT: 173.94 LBS | HEART RATE: 69 BPM | TEMPERATURE: 98 F

## 2023-07-11 DIAGNOSIS — E11.9 TYPE 2 DIABETES MELLITUS WITHOUT COMPLICATIONS: ICD-10-CM

## 2023-07-11 DIAGNOSIS — Z98.890 OTHER SPECIFIED POSTPROCEDURAL STATES: Chronic | ICD-10-CM

## 2023-07-11 DIAGNOSIS — C43.71 MALIGNANT MELANOMA OF RIGHT LOWER LIMB, INCLUDING HIP: ICD-10-CM

## 2023-07-11 DIAGNOSIS — C43.9 MALIGNANT MELANOMA OF SKIN, UNSPECIFIED: ICD-10-CM

## 2023-07-11 LAB
ANION GAP SERPL CALC-SCNC: 8 MMOL/L — SIGNIFICANT CHANGE UP (ref 7–14)
BUN SERPL-MCNC: 12 MG/DL — SIGNIFICANT CHANGE UP (ref 7–23)
CALCIUM SERPL-MCNC: 9.6 MG/DL — SIGNIFICANT CHANGE UP (ref 8.4–10.5)
CHLORIDE SERPL-SCNC: 107 MMOL/L — SIGNIFICANT CHANGE UP (ref 98–107)
CO2 SERPL-SCNC: 21 MMOL/L — LOW (ref 22–31)
CREAT SERPL-MCNC: 1.04 MG/DL — SIGNIFICANT CHANGE UP (ref 0.5–1.3)
EGFR: 74 ML/MIN/1.73M2 — SIGNIFICANT CHANGE UP
GLUCOSE SERPL-MCNC: 206 MG/DL — HIGH (ref 70–99)
HCT VFR BLD CALC: 35.2 % — LOW (ref 39–50)
HGB BLD-MCNC: 11.2 G/DL — LOW (ref 13–17)
MCHC RBC-ENTMCNC: 28.9 PG — SIGNIFICANT CHANGE UP (ref 27–34)
MCHC RBC-ENTMCNC: 31.8 GM/DL — LOW (ref 32–36)
MCV RBC AUTO: 90.7 FL — SIGNIFICANT CHANGE UP (ref 80–100)
NRBC # BLD: 0 /100 WBCS — SIGNIFICANT CHANGE UP (ref 0–0)
NRBC # FLD: 0 K/UL — SIGNIFICANT CHANGE UP (ref 0–0)
PLATELET # BLD AUTO: 222 K/UL — SIGNIFICANT CHANGE UP (ref 150–400)
POTASSIUM SERPL-MCNC: 4.5 MMOL/L — SIGNIFICANT CHANGE UP (ref 3.5–5.3)
POTASSIUM SERPL-SCNC: 4.5 MMOL/L — SIGNIFICANT CHANGE UP (ref 3.5–5.3)
RBC # BLD: 3.88 M/UL — LOW (ref 4.2–5.8)
RBC # FLD: 13.3 % — SIGNIFICANT CHANGE UP (ref 10.3–14.5)
SODIUM SERPL-SCNC: 136 MMOL/L — SIGNIFICANT CHANGE UP (ref 135–145)
WBC # BLD: 5.02 K/UL — SIGNIFICANT CHANGE UP (ref 3.8–10.5)
WBC # FLD AUTO: 5.02 K/UL — SIGNIFICANT CHANGE UP (ref 3.8–10.5)

## 2023-07-11 NOTE — H&P PST ADULT - MUSCULOSKELETAL COMMENTS
Arthritis b/l hands and occasional lower back pain Arthritis b/l hands and occasional lower back pain - right ankle screws placed from old injury - pt c/o of discomfort with prolonged walking

## 2023-07-11 NOTE — H&P PST ADULT - HEMATOLOGY/LYMPHATICS COMMENTS
Pt hx PE noted incidentally on CT scan 3/23 - pt on Eliquis with repeat CT scan done 6/27/23 and read by Pulmonary - no PE at this time - pt has port from Chemo for rectal Ca Dx 3/22

## 2023-07-11 NOTE — H&P PST ADULT - PROBLEM SELECTOR PLAN 1
Pt scheduled for surgery and preop instructions including instructions for taking own GI protection  and for Chlorhexidine use in showering on the day of surgery, given verbally and with use of  written materials, and patient confirming understanding of such instructions using  teach back method.  CArdio / PCP clearance in chart with recent results of CT scan for evaluation of PE - pt able to stop Eliquis  LFTs and A1c labs in chart   EKG in chart with Echo

## 2023-07-11 NOTE — H&P PST ADULT - RESPIRATORY AND THORAX COMMENTS
Hx PE found on CT done 3/23 - on Eliquis with repeat CT scan done 6/23 - PE no longer present and pt taken off Eliquis

## 2023-07-11 NOTE — H&P PST ADULT - NSICDXPASTSURGICALHX_GEN_ALL_CORE_FT
PAST SURGICAL HISTORY:  H/O colonoscopy     H/O endoscopy      PAST SURGICAL HISTORY:  H/O colonoscopy     H/O endoscopy     History of prostate surgery

## 2023-07-11 NOTE — H&P PST ADULT - HISTORY OF PRESENT ILLNESS
77M w/ rectal CA (last chemo 12/2022), HTN, HLD, DM2 sent to ED by surgeon for PE discovered incidentally on outpatient CT. Patient reports feeling well - denies CP, palpitations, SOB, abdominal pain, n/v, LE pain, swelling, headache. Denies personal/family history of clotting or bleeding disorders. No changes to medications recently. No blood thinner use.   He began neoadjuvant radiation on 6/1/22 and completed treatment 6/7/2022. Patient was on total neoadjuvant therapy regimen with short course RT followed by 12-16 weeks of FOLFOX, last session 12/2022. Was having routine CT for restaging and planning for upcoming surgery.     ED vitals: 134/75, HR 98, % RA, afebrile  ED course: Started on heparin gtt.  Pt is a 78 yr old male scheduled for Resection of Melanoma of Left Foot with skin Graft with Dr Durant and Dr Watson tentatively 7/19/23 - pt hx rectal ca Dx 3/22 with Chemo / Port placed - ending 5/23 - pt had CT scan to restage 3/23   and incidental PE found and pt started on Eliquis - CT repeated 6/27/23 and PE no longer present - Eliquis stopped and pt to have surgery for left foot melanoma biopsied 2/20/23 - area had been evaluated for past several years - pt denies pain - Hx Prostate Ca with seed implant / RT many years ago - Hx DMT2, HLD

## 2023-07-11 NOTE — H&P PST ADULT - NSICDXPASTMEDICALHX_GEN_ALL_CORE_FT
PAST MEDICAL HISTORY:  Colon cancer Rectal on Chemo    DM (diabetes mellitus)     HLD (hyperlipidemia)     Hypercholesterolemia     Prostate CA seed placement    Pulmonary embolism      PAST MEDICAL HISTORY:  Ankle injury     Colon cancer Rectal on Chemo    DM (diabetes mellitus)     HLD (hyperlipidemia)     Hypercholesterolemia     Malignant melanoma     Prostate CA seed placement    Pulmonary embolism

## 2023-07-13 NOTE — CONSULT LETTER
[Dear  ___] : Dear  [unfilled], [Consult Letter:] : I had the pleasure of evaluating your patient, [unfilled]. [( Thank you for referring [unfilled] for consultation for _____ )] : Thank you for referring [unfilled] for consultation for [unfilled] [Please see my note below.] : Please see my note below. [Consult Closing:] : Thank you very much for allowing me to participate in the care of this patient.  If you have any questions, please do not hesitate to contact me. [Sincerely,] : Sincerely, [DrMichel  ___] : Dr. FORRESTER [DrMichel ___] : Dr. FORRESTER [FreeTextEntry2] : Trey Pressley MD [FreeTextEntry3] : Pernell Barcenas MD, FICS, FACS\par Director of Surgical Oncology- [facility] \par , Department of Surgery  \par [facility] [employer] [facility] [employer] [facility] \par [address]\[place] [address]\par \par [address]\[place] [address]\par \par [address]\[place] [address]\par \par (mob) [phone]\par (o) [phone]\par (f) [phone]\par \par

## 2023-07-13 NOTE — ASSESSMENT
[FreeTextEntry1] : IMP: 77 year old male w/ rectal cancer\par Stage T3c N0. CRM: involved. Sphincter involvement: absent \par \par S/p neoadjuvant RT concluded ~6/6/22- short course radiation  \par Patient completed neoadjuvant therapy regimen with short course RT followed by 12-16 weeks of FOLFOX.\par \par pre-op restaging imaging from 3/9/2023 showed incidental finding of segmental & subsegmental PE -- pt sent to ED. \par repeat EUS 5/11/2023- shows only rectal scar and bx shows now evidence of adeno\par \par 6/13/2023- Ildefonso's case was discussed today at tumor board, repeat EUS shows complete response. No need for surgery at this point \par * had recent biopsy of a left plantar mole by his podiatrist with path showing superficial melanoma -- will refer to Dr. gumaro Durant \par \par PLAN: \par No role for surgery at this point given his complete clinical response, he will need repeat scopes Q3 months, discussed with Dr. Pressley and Dr. Nichols. \par RTO in 4 months after repeat scope \par \par I have discussed the diagnosis, therapeutic plan and options with the patient at length. Patient expressed verbal understanding to proceed with proposed plan. All questions answered. \par \par \par \par

## 2023-07-13 NOTE — PHYSICAL EXAM
[Normal] : supple, no neck mass and thyroid not enlarged [Normal Neck Lymph Nodes] : normal neck lymph nodes  [Normal Supraclavicular Lymph Nodes] : normal supraclavicular lymph nodes [Normal Groin Lymph Nodes] : normal groin lymph nodes [Normal Axillary Lymph Nodes] : normal axillary lymph nodes [Normal] : oriented to person, place and time, with appropriate affect [FreeTextEntry1] : COVID-19 precautions as per [facility] policy was universally followed

## 2023-07-13 NOTE — HISTORY OF PRESENT ILLNESS
[de-identified] : Mr. REBA WHITLOCK is a 77 year old male who present today for follow up visit for rectal cancer. \par \par Referred by Dr. Pressley \par PMH: HTN, HLD, DM, prostate cancer \par Family History: breast cancer in sister \par \par Endoscopy and Colonoscopy 3/23/22: Rectal mass pending final pathology \par \par EUS 4/26/22: T3 N0 \par  \par MRI pelvis 4/26/22: 3.0 cm right anterior mid to lower rectal tumor located 5.5 cm from the anal verge and 1.2 cm from the top anal sphincter. 6 mm extension beyond the muscularis propria along the right anterior wall reaching the circumferential resection margin. Stage T3 N0. CRM: involved. Sphincter: absent \par \par CT chest/abdomen/pelvis performed on 5/12/22 showed no evidence of metastatic disease.\par \par Patient presented at rectal tumor board.  He began neoadjuvant radiation on 6/1/22 under the care of Dr. Vieira, completed treatment 6/7/2022. Plans to have port placed & start FOLFOX with Dr. Daniel. \par Patient is on total neoadjuvant therapy regimen with short course RT followed by 12-16 weeks of FOLFOX, will restage after \par \par 1/4/23 states feeling very weak, after chemo therapy, he was admitted to [facility] due to dehydration. He has completed radiation and chemo therapy. Restaging imaging CT C/A/P & MR mass protocol now; completed chemotherapy with Dr. Daniel and radiation.\par Patient is in need of pre habitation prior to surgery referred to Dr. He for PT. He is current;y ECOG 1-2 \par RTO 2 weeks \par \par 3/14/2023- Discussed with patient that given the new finding of a PE, I would personally prefer to defer surgery for future given risk of PE progression and significant bleeding risk on anticoagulation in the perioperative period and continue with neoadjuvant therapy until the PE resolves however, will represent his case at GI tumor board for group consensus. Patient in agreement.\par \par Case presented at [facility] GI tumor board- consensus to hold off on surgery at this point\par Reassess for systemic therapy\par Repeat EUS to assess for treatment response\par RTO after EUS\par \par 6/13/2023- Yoavs case was discussed today at tumor board, repeat EUS shows complete response. No need for surgery at this point \par

## 2023-07-18 ENCOUNTER — TRANSCRIPTION ENCOUNTER (OUTPATIENT)
Age: 78
End: 2023-07-18

## 2023-07-18 VITALS
HEART RATE: 76 BPM | DIASTOLIC BLOOD PRESSURE: 74 MMHG | RESPIRATION RATE: 16 BRPM | SYSTOLIC BLOOD PRESSURE: 147 MMHG | TEMPERATURE: 98 F | OXYGEN SATURATION: 98 % | WEIGHT: 173.94 LBS | HEIGHT: 64.5 IN

## 2023-07-19 ENCOUNTER — RESULT REVIEW (OUTPATIENT)
Age: 78
End: 2023-07-19

## 2023-07-19 ENCOUNTER — TRANSCRIPTION ENCOUNTER (OUTPATIENT)
Age: 78
End: 2023-07-19

## 2023-07-19 ENCOUNTER — APPOINTMENT (OUTPATIENT)
Dept: SURGICAL ONCOLOGY | Facility: AMBULATORY SURGERY CENTER | Age: 78
End: 2023-07-19

## 2023-07-19 ENCOUNTER — OUTPATIENT (OUTPATIENT)
Dept: OUTPATIENT SERVICES | Facility: HOSPITAL | Age: 78
LOS: 1 days | Discharge: HOME | End: 2023-07-19
Payer: MEDICARE

## 2023-07-19 VITALS
TEMPERATURE: 97 F | RESPIRATION RATE: 19 BRPM | OXYGEN SATURATION: 100 % | DIASTOLIC BLOOD PRESSURE: 77 MMHG | HEART RATE: 74 BPM | SYSTOLIC BLOOD PRESSURE: 129 MMHG

## 2023-07-19 DIAGNOSIS — C43.71 MALIGNANT MELANOMA OF RIGHT LOWER LIMB, INCLUDING HIP: ICD-10-CM

## 2023-07-19 DIAGNOSIS — Z98.890 OTHER SPECIFIED POSTPROCEDURAL STATES: Chronic | ICD-10-CM

## 2023-07-19 LAB — GLUCOSE BLDC GLUCOMTR-MCNC: 118 MG/DL — HIGH (ref 70–99)

## 2023-07-19 PROCEDURE — 21016 RESECT FACE/SCALP TUM 2 CM/>: CPT

## 2023-07-19 PROCEDURE — 88341 IMHCHEM/IMCYTCHM EA ADD ANTB: CPT | Mod: 26

## 2023-07-19 PROCEDURE — 88305 TISSUE EXAM BY PATHOLOGIST: CPT | Mod: 26

## 2023-07-19 PROCEDURE — 88342 IMHCHEM/IMCYTCHM 1ST ANTB: CPT | Mod: 26

## 2023-07-19 NOTE — ASU DISCHARGE PLAN (ADULT/PEDIATRIC) - CALL YOUR DOCTOR IF YOU HAVE ANY OF THE FOLLOWING:
Patient had tkr 89 days ago and he has had nerve issues ever since.  His PCP out of town till the 29th.  He states this nerve issue is getting worse, it is driving him nuts.  What can he do about it.  He woke up in such pain this morning he cried.  Icing doesn't help.  He states he does not want pills, he wants answers.    Please call with advice. If you don't respond, he will go into urgent care.  
Per Dr PALMA Rene last note if left leg paraesthesia continues to obtain EMG.  Patient also asking for something for pain- \"it feels like my left foot is a block of ice\".  States foot is warm and normal skin color.    Advised we would put in pain management order .  He is agreeable to both.  He should make appointment with Dr DAY Mathew after the EMG to review the results.  
Bleeding that does not stop/Swelling that gets worse/Pain not relieved by Medications/Fever greater than (need to indicate Fahrenheit or Celsius)/Wound/Surgical Site with redness, or foul smelling discharge or pus

## 2023-07-19 NOTE — ASU DISCHARGE PLAN (ADULT/PEDIATRIC) - ASU DC SPECIAL INSTRUCTIONSFT
No heavy lifting, strenuous activity or exercise for 2 weeks  Use Crutches and off loading with NWB of the LLE for 2 weeks  Ok to shower with a cast bag covering your dressing  Do not remove your dressing and do not get it wet please

## 2023-07-19 NOTE — ASU DISCHARGE PLAN (ADULT/PEDIATRIC) - NS MD DC FALL RISK RISK
For information on Fall & Injury Prevention, visit: [email or web address] OR  [email or web address] OR  https://www.cdc.gov/steadi/patient.html

## 2023-07-19 NOTE — BRIEF OPERATIVE NOTE - NSICDXBRIEFPROCEDURE_GEN_ALL_CORE_FT
PROCEDURES:  Wide excision, melanoma, extremity, with split-thickness skin graft 19-Jul-2023 10:35:55  Les Hinton

## 2023-07-19 NOTE — ASU DISCHARGE PLAN (ADULT/PEDIATRIC) - CARE PROVIDER_API CALL
Marietta Watson  Plastic Surgery  [address] [place] [id number]  Phone: [phone]  Fax: [phone]  Follow Up Time: 1 week

## 2023-07-26 LAB — SURGICAL PATHOLOGY STUDY: SIGNIFICANT CHANGE UP

## 2023-08-24 ENCOUNTER — APPOINTMENT (OUTPATIENT)
Dept: ENDOCRINOLOGY | Facility: CLINIC | Age: 78
End: 2023-08-24
Payer: MEDICARE

## 2023-08-24 VITALS
WEIGHT: 166.13 LBS | DIASTOLIC BLOOD PRESSURE: 70 MMHG | SYSTOLIC BLOOD PRESSURE: 136 MMHG | OXYGEN SATURATION: 99 % | HEIGHT: 66 IN | TEMPERATURE: 97.7 F | BODY MASS INDEX: 26.7 KG/M2 | HEART RATE: 79 BPM

## 2023-08-24 PROBLEM — C43.9 MALIGNANT MELANOMA OF SKIN, UNSPECIFIED: Chronic | Status: ACTIVE | Noted: 2023-07-11

## 2023-08-24 PROBLEM — S99.919A UNSPECIFIED INJURY OF UNSPECIFIED ANKLE, INITIAL ENCOUNTER: Chronic | Status: ACTIVE | Noted: 2023-07-11

## 2023-08-24 LAB
GLUCOSE BLDC GLUCOMTR-MCNC: 100
HBA1C MFR BLD HPLC: 7.9

## 2023-08-24 PROCEDURE — 99214 OFFICE O/P EST MOD 30 MIN: CPT

## 2023-08-24 PROCEDURE — 83036 HEMOGLOBIN GLYCOSYLATED A1C: CPT | Mod: QW

## 2023-08-24 PROCEDURE — 82962 GLUCOSE BLOOD TEST: CPT

## 2023-08-24 RX ORDER — DAPAGLIFLOZIN 5 MG/1
5 TABLET, FILM COATED ORAL DAILY
Qty: 90 | Refills: 2 | Status: DISCONTINUED | COMMUNITY
Start: 2023-05-12 | End: 2023-08-24

## 2023-08-24 NOTE — PHYSICAL EXAM
[Alert] : alert [Well Nourished] : well nourished [No Acute Distress] : no acute distress [Well Developed] : well developed [Normal Sclera/Conjunctiva] : normal sclera/conjunctiva [EOMI] : extra ocular movement intact [No Proptosis] : no proptosis [Normal Oropharynx] : the oropharynx was normal [Thyroid Not Enlarged] : the thyroid was not enlarged [No Respiratory Distress] : no respiratory distress [No Accessory Muscle Use] : no accessory muscle use [Clear to Auscultation] : lungs were clear to auscultation bilaterally [Normal S1, S2] : normal S1 and S2 [Normal Rate] : heart rate was normal [Regular Rhythm] : with a regular rhythm [No Edema] : no peripheral edema [Pedal Pulses Normal] : the pedal pulses are present [Normal Bowel Sounds] : normal bowel sounds [Not Tender] : non-tender [Not Distended] : not distended [Soft] : abdomen soft [Normal Anterior Cervical Nodes] : no anterior cervical lymphadenopathy [Spine Straight] : spine straight [Kyphosis] : kyphosis present [No Stigmata of Cushings Syndrome] : no stigmata of Cushings Syndrome [No Involuntary Movements] : no involuntary movements were seen [Normal Strength/Tone] : muscle strength and tone were normal [No Rash] : no rash [Acanthosis Nigricans] : no acanthosis nigricans [Normal Reflexes] : deep tendon reflexes were 2+ and symmetric [No Tremors] : no tremors [Oriented x3] : oriented to person, place, and time [de-identified] : walks with cane

## 2023-08-24 NOTE — HISTORY OF PRESENT ILLNESS
[FreeTextEntry1] : 77 yearM here for f/up for Type 2 diabetes mellitus  Patient with PMHx as below, remarkable for : HTN, HLD, DM, prostate cancer, rectal cancer s/p chemo and RT    Screening  Ophthalmology: follows  Podiatry: follows, has appointment today LDL: on lipitor 40mg po daily   EGFR: 70    Current diabetic medication regimen (verified with patient):  glipizide 10mg po 2 tabs with breakfast and 1 tab with dinner  metformin 1g po bid   Did not get jardiance due to insurance issues. Reports copay too high on farxiga and did not obtain.     SMBG ranges (glucometer): reported  am: <140mg/dl   Hypoglycemia:  lowest reported level was 89mg/dl

## 2023-09-15 ENCOUNTER — RX RENEWAL (OUTPATIENT)
Age: 78
End: 2023-09-15

## 2023-09-29 ENCOUNTER — OUTPATIENT (OUTPATIENT)
Dept: OUTPATIENT SERVICES | Facility: HOSPITAL | Age: 78
LOS: 1 days | End: 2023-09-29
Payer: MEDICARE

## 2023-09-29 ENCOUNTER — TRANSCRIPTION ENCOUNTER (OUTPATIENT)
Age: 78
End: 2023-09-29

## 2023-09-29 ENCOUNTER — APPOINTMENT (OUTPATIENT)
Dept: INTERVENTIONAL RADIOLOGY/VASCULAR | Facility: HOSPITAL | Age: 78
End: 2023-09-29
Payer: MEDICARE

## 2023-09-29 VITALS
TEMPERATURE: 98 F | RESPIRATION RATE: 20 BRPM | DIASTOLIC BLOOD PRESSURE: 72 MMHG | OXYGEN SATURATION: 100 % | SYSTOLIC BLOOD PRESSURE: 125 MMHG | HEART RATE: 83 BPM

## 2023-09-29 VITALS
SYSTOLIC BLOOD PRESSURE: 116 MMHG | TEMPERATURE: 98 F | HEART RATE: 75 BPM | RESPIRATION RATE: 16 BRPM | OXYGEN SATURATION: 99 % | DIASTOLIC BLOOD PRESSURE: 76 MMHG

## 2023-09-29 DIAGNOSIS — D64.81 ANEMIA DUE TO ANTINEOPLASTIC CHEMOTHERAPY: ICD-10-CM

## 2023-09-29 DIAGNOSIS — Z98.890 OTHER SPECIFIED POSTPROCEDURAL STATES: Chronic | ICD-10-CM

## 2023-09-29 DIAGNOSIS — C20 MALIGNANT NEOPLASM OF RECTUM: ICD-10-CM

## 2023-09-29 DIAGNOSIS — R01.1 CARDIAC MURMUR, UNSPECIFIED: ICD-10-CM

## 2023-09-29 LAB
GLUCOSE BLDC GLUCOMTR-MCNC: 95 MG/DL — SIGNIFICANT CHANGE UP (ref 70–99)
GLUCOSE BLDC GLUCOMTR-MCNC: 95 MG/DL — SIGNIFICANT CHANGE UP (ref 70–99)

## 2023-09-29 PROCEDURE — 82962 GLUCOSE BLOOD TEST: CPT

## 2023-09-29 PROCEDURE — 36590 REMOVAL TUNNELED CV CATH: CPT

## 2023-09-29 PROCEDURE — 77001 FLUOROGUIDE FOR VEIN DEVICE: CPT | Mod: 26

## 2023-09-29 PROCEDURE — 76000 FLUOROSCOPY <1 HR PHYS/QHP: CPT

## 2023-09-29 RX ORDER — ATORVASTATIN CALCIUM 80 MG/1
1 TABLET, FILM COATED ORAL
Refills: 0 | DISCHARGE

## 2023-09-29 RX ORDER — PANTOPRAZOLE SODIUM 20 MG/1
1 TABLET, DELAYED RELEASE ORAL
Refills: 0 | DISCHARGE

## 2023-09-29 RX ORDER — FOLIC ACID 0.8 MG
1 TABLET ORAL
Qty: 0 | Refills: 0 | DISCHARGE

## 2023-09-29 RX ORDER — ATORVASTATIN CALCIUM 80 MG/1
1 TABLET, FILM COATED ORAL
Qty: 0 | Refills: 0 | DISCHARGE

## 2023-09-29 NOTE — ASU DISCHARGE PLAN (ADULT/PEDIATRIC) - DRIVING DURATION DAY(S)
NAME: Antonia Simmons Sr.        :  1954        MRN:  [id number]        Assessment :    Plan:  --ESRD-  Hypotension  Anemia  CP dyspnea  CAD --[id number]  HD today - UF has been limited by low BP, but BP has been better the last day - hopefully better UF today    On OWEN. Prn prbc's (s/p transfusion )    On Midodrine as of 19 (dose increased )       Subjective:     Chief Complaint: resting. Denies complaint other than shoulder discomfort. Joking and in good spirits. We discussed the above. Family in the room. Review of Systems:    Symptom Y/N Comments  Symptom Y/N Comments   Fever/Chills    Chest Pain     Poor Appetite    Edema     Cough    Abdominal Pain     Sputum    Joint Pain     SOB/COREA    Pruritis/Rash     Nausea/vomit    Tolerating PT/OT     Diarrhea    Tolerating Diet     Constipation    Other       Could not obtain due to:      Objective:     VITALS:   Last 24hrs VS reviewed since prior progress note. Most recent are:  Visit Vitals  /46 (BP 1 Location: Right arm, BP Patient Position: Hip tilt, left; At rest)   Pulse 74   Temp 97.3 °F (36.3 °C)   Resp 18   Ht 5' 7\" (1.702 m)   Wt 77.3 kg (170 lb 6.7 oz)   SpO2 96%   BMI 26.69 kg/m²       Intake/Output Summary (Last 24 hours) at 2019 0720  Last data filed at 9/10/2019 2232  Gross per 24 hour   Intake 580 ml   Output    Net 580 ml      Telemetry Reviewed:     PHYSICAL EXAM:  General: intubated.   1+ edema      Lab Data Reviewed: (see below)    Medications Reviewed: (see below)    PMH/SH reviewed - no change compared to H&P  ________________________________________________________________________  Care Plan discussed with:  Patient     Family      RN     Care Manager                    Consultant:          Comments   >50% of visit spent in counseling and coordination of care ________________________________________________________________________  Oneal Pang MD     Procedures: see electronic medical records for all procedures/Xrays and details which  were not copied into this note but were reviewed prior to creation of Plan. LABS:  Recent Labs     09/11/19  0426 09/10/19  0431   WBC 5.6 5.3   HGB 9.8* 9.9*   HCT 31.5* 30.2*   PLT 88* 74*     Recent Labs     09/11/19  0426 09/10/19  0431 09/09/19  0449    138 134*   K 4.1 4.0 4.3    103 101   CO2 28 30 26   BUN 36* 23* 36*   CREA 5.58* 4.07* 5.11*   GLU 89 69 139*   CA 8.0* 7.8* 7.9*     Recent Labs     09/11/19  0426 09/10/19  0431 09/09/19  0449   SGOT 58* 63* 63*   * 105 80   TP 6.5 6.4 6.1*   ALB 2.9* 2.8* 2.6*   GLOB 3.6 3.6 3.5     No results for input(s): INR, PTP, APTT in the last 72 hours. No lab exists for component: INREXT, INREXT   No results for input(s): FE, TIBC, PSAT, FERR in the last 72 hours. Lab Results   Component Value Date/Time    Folate 7.0 01/06/2016 01:08 PM      No results for input(s): PH, PCO2, PO2 in the last 72 hours. No results for input(s): CPK, CKMB in the last 72 hours.     No lab exists for component: TROPONINI  No components found for: [place]  Lab Results   Component Value Date/Time    Color YELLOW/STRAW 09/05/2019 06:00 PM    Appearance CLEAR 09/05/2019 06:00 PM    Specific gravity 1.027 09/05/2019 06:00 PM    pH (UA) 7.0 09/05/2019 06:00 PM    Protein 100 (A) 09/05/2019 06:00 PM    Glucose NEGATIVE  09/05/2019 06:00 PM    Ketone NEGATIVE  09/05/2019 06:00 PM    Bilirubin NEGATIVE  09/05/2019 06:00 PM    Urobilinogen 1.0 09/05/2019 06:00 PM    Nitrites NEGATIVE  09/05/2019 06:00 PM    Leukocyte Esterase NEGATIVE  09/05/2019 06:00 PM    Epithelial cells FEW 09/05/2019 06:00 PM    Bacteria NEGATIVE  09/05/2019 06:00 PM    WBC 0-4 09/05/2019 06:00 PM    RBC 5-10 09/05/2019 06:00 PM       MEDICATIONS:  Current Facility-Administered Medications   Medication Dose Route Frequency    heparin (porcine) pf 300 Units  300 Units InterCATHeter PRN    magnesium oxide (MAG-OX) tablet 400 mg  400 mg Oral DAILY    midodrine (PROAMITINE) tablet 10 mg  10 mg Oral TID WITH MEALS    pantoprazole (PROTONIX) tablet 40 mg  40 mg Oral ACB&D    insulin lispro (HUMALOG) injection   SubCUTAneous AC&HS    glucose chewable tablet 16 g  4 Tab Oral PRN    glucagon (GLUCAGEN) injection 1 mg  1 mg IntraMUSCular PRN    dextrose 10% infusion 0-250 mL  0-250 mL IntraVENous PRN    0.9% sodium chloride infusion 250 mL  250 mL IntraVENous PRN    epoetin calos-epbx (RETACRIT) injection 6,000 Units  6,000 Units SubCUTAneous DIALYSIS MON, WED & FRI    aspirin chewable tablet 81 mg  81 mg Oral DAILY    ticagrelor (BRILINTA) tablet 90 mg  90 mg Oral Q12H    albumin human 25% (BUMINATE) solution 12.5 g  12.5 g IntraVENous DIALYSIS PRN    sodium chloride (NS) flush 5-40 mL  5-40 mL IntraVENous Q8H    sodium chloride (NS) flush 5-40 mL  5-40 mL IntraVENous PRN    sodium phosphate (FLEET'S) enema 1 Enema  1 Enema Rectal PRN    oxyCODONE-acetaminophen (PERCOCET) 5-325 mg per tablet 1-2 Tab  1-2 Tab Oral Q4H PRN    pravastatin (PRAVACHOL) tablet 80 mg  80 mg Oral QHS    cyclobenzaprine (FLEXERIL) tablet 5 mg  5 mg Oral TID PRN    finasteride (PROSCAR) tablet 5 mg  5 mg Oral DAILY    FLUoxetine (PROzac) capsule 20 mg  20 mg Oral DAILY    mirtazapine (REMERON) tablet 30 mg  30 mg Oral QHS    nitroglycerin (NITROSTAT) tablet 0.4 mg  0.4 mg SubLINGual Q5MIN PRN    pregabalin (LYRICA) capsule 50 mg  50 mg Oral TID    traZODone (DESYREL) tablet 150 mg  150 mg Oral QHS 1

## 2023-10-01 PROBLEM — Z92.3 HISTORY OF RADIATION THERAPY: Status: RESOLVED | Noted: 2022-06-02 | Resolved: 2023-10-01

## 2023-10-03 ENCOUNTER — LABORATORY RESULT (OUTPATIENT)
Age: 78
End: 2023-10-03

## 2023-10-03 ENCOUNTER — APPOINTMENT (OUTPATIENT)
Dept: CARDIOLOGY | Facility: CLINIC | Age: 78
End: 2023-10-03
Payer: MEDICARE

## 2023-10-03 VITALS
SYSTOLIC BLOOD PRESSURE: 150 MMHG | HEIGHT: 66 IN | HEART RATE: 60 BPM | TEMPERATURE: 98.4 F | WEIGHT: 168 LBS | DIASTOLIC BLOOD PRESSURE: 70 MMHG | OXYGEN SATURATION: 94 % | BODY MASS INDEX: 27 KG/M2

## 2023-10-03 DIAGNOSIS — Z23 ENCOUNTER FOR IMMUNIZATION: ICD-10-CM

## 2023-10-03 PROCEDURE — 93000 ELECTROCARDIOGRAM COMPLETE: CPT

## 2023-10-03 PROCEDURE — 99214 OFFICE O/P EST MOD 30 MIN: CPT

## 2023-10-04 LAB
ALBUMIN SERPL ELPH-MCNC: 4.7 G/DL
ALP BLD-CCNC: 79 U/L
ALT SERPL-CCNC: 22 U/L
ANION GAP SERPL CALC-SCNC: 16 MMOL/L
AST SERPL-CCNC: 27 U/L
BASOPHILS # BLD AUTO: 0.04 K/UL
BASOPHILS NFR BLD AUTO: 0.8 %
BILIRUB DIRECT SERPL-MCNC: 0.1 MG/DL
BILIRUB INDIRECT SERPL-MCNC: 0.1 MG/DL
BILIRUB SERPL-MCNC: 0.2 MG/DL
BUN SERPL-MCNC: 18 MG/DL
CALCIUM SERPL-MCNC: 10 MG/DL
CHLORIDE SERPL-SCNC: 103 MMOL/L
CHOLEST SERPL-MCNC: 187 MG/DL
CK SERPL-CCNC: 508 U/L
CO2 SERPL-SCNC: 22 MMOL/L
CREAT SERPL-MCNC: 1.03 MG/DL
EGFR: 74 ML/MIN/1.73M2
EOSINOPHIL # BLD AUTO: 0.05 K/UL
EOSINOPHIL NFR BLD AUTO: 1 %
ESTIMATED AVERAGE GLUCOSE: 160 MG/DL
GLUCOSE SERPL-MCNC: 173 MG/DL
HBA1C MFR BLD HPLC: 7.2 %
HCT VFR BLD CALC: 38.6 %
HDLC SERPL-MCNC: 74 MG/DL
HGB BLD-MCNC: 11.9 G/DL
IMM GRANULOCYTES NFR BLD AUTO: 0.2 %
LDLC SERPL CALC-MCNC: 100 MG/DL
LYMPHOCYTES # BLD AUTO: 1.23 K/UL
LYMPHOCYTES NFR BLD AUTO: 23.4 %
MAGNESIUM SERPL-MCNC: 1.5 MG/DL
MAN DIFF?: NORMAL
MCHC RBC-ENTMCNC: 28.3 PG
MCHC RBC-ENTMCNC: 30.8 GM/DL
MCV RBC AUTO: 91.9 FL
MONOCYTES # BLD AUTO: 0.63 K/UL
MONOCYTES NFR BLD AUTO: 12 %
NEUTROPHILS # BLD AUTO: 3.29 K/UL
NEUTROPHILS NFR BLD AUTO: 62.6 %
NONHDLC SERPL-MCNC: 114 MG/DL
OSMOLALITY SERPL: 298 MOSMOL/KG
PHOSPHATE SERPL-MCNC: 3.6 MG/DL
PLATELET # BLD AUTO: 232 K/UL
POTASSIUM SERPL-SCNC: 4.6 MMOL/L
PROT SERPL-MCNC: 7.7 G/DL
RBC # BLD: 4.2 M/UL
RBC # FLD: 13.7 %
SODIUM SERPL-SCNC: 141 MMOL/L
T3RU NFR SERPL: 1.1 TBI
T4 FREE SERPL-MCNC: 1.2 NG/DL
T4 SERPL-MCNC: 6.6 UG/DL
TRIGL SERPL-MCNC: 75 MG/DL
TSH SERPL-ACNC: 1.01 UIU/ML
URATE SERPL-MCNC: 5.2 MG/DL
WBC # FLD AUTO: 5.25 K/UL

## 2023-11-16 ENCOUNTER — APPOINTMENT (OUTPATIENT)
Dept: ENDOCRINOLOGY | Facility: CLINIC | Age: 78
End: 2023-11-16
Payer: MEDICARE

## 2023-11-16 VITALS
TEMPERATURE: 98.3 F | SYSTOLIC BLOOD PRESSURE: 144 MMHG | OXYGEN SATURATION: 97 % | HEART RATE: 86 BPM | WEIGHT: 169.38 LBS | DIASTOLIC BLOOD PRESSURE: 74 MMHG | HEIGHT: 66 IN | BODY MASS INDEX: 27.22 KG/M2

## 2023-11-16 LAB
GLUCOSE BLDC GLUCOMTR-MCNC: 193
HBA1C MFR BLD HPLC: 7.7

## 2023-11-16 PROCEDURE — 99214 OFFICE O/P EST MOD 30 MIN: CPT

## 2023-11-16 PROCEDURE — 83036 HEMOGLOBIN GLYCOSYLATED A1C: CPT | Mod: QW

## 2023-11-16 PROCEDURE — 82962 GLUCOSE BLOOD TEST: CPT

## 2023-11-16 RX ORDER — BLOOD SUGAR DIAGNOSTIC
STRIP MISCELLANEOUS
Qty: 270 | Refills: 2 | Status: ACTIVE | COMMUNITY
Start: 2023-03-17 | End: 1900-01-01

## 2023-12-07 NOTE — H&P PST ADULT - NSHP PST SURGERY DATE_DT_GEN_A_CORE
19-Jul-2023 Render Risk Assessment In Note?: no Detail Level: Simple Additional Notes: Consider bx on left forearm and right later thigh if no resolution from LN2.

## 2023-12-31 ENCOUNTER — RX RENEWAL (OUTPATIENT)
Age: 78
End: 2023-12-31

## 2023-12-31 RX ORDER — FOLIC ACID 1 MG/1
1 TABLET ORAL
Qty: 90 | Refills: 1 | Status: ACTIVE | COMMUNITY
Start: 2023-01-11 | End: 1900-01-01

## 2024-01-17 ENCOUNTER — NON-APPOINTMENT (OUTPATIENT)
Age: 79
End: 2024-01-17

## 2024-01-17 ENCOUNTER — APPOINTMENT (OUTPATIENT)
Dept: CARDIOLOGY | Facility: CLINIC | Age: 79
End: 2024-01-17
Payer: MEDICARE

## 2024-01-17 VITALS
DIASTOLIC BLOOD PRESSURE: 80 MMHG | HEART RATE: 72 BPM | HEIGHT: 66 IN | SYSTOLIC BLOOD PRESSURE: 138 MMHG | TEMPERATURE: 97.6 F | OXYGEN SATURATION: 99 %

## 2024-01-17 DIAGNOSIS — Z85.46 PERSONAL HISTORY OF MALIGNANT NEOPLASM OF PROSTATE: ICD-10-CM

## 2024-01-17 PROCEDURE — 99214 OFFICE O/P EST MOD 30 MIN: CPT

## 2024-01-17 PROCEDURE — 93000 ELECTROCARDIOGRAM COMPLETE: CPT

## 2024-01-17 RX ORDER — LISINOPRIL 5 MG/1
5 TABLET ORAL
Qty: 90 | Refills: 1 | Status: ACTIVE | COMMUNITY
Start: 2024-01-17 | End: 1900-01-01

## 2024-01-17 RX ORDER — APIXABAN 5 MG/1
5 TABLET, FILM COATED ORAL
Qty: 60 | Refills: 3 | Status: DISCONTINUED | COMMUNITY
Start: 2023-03-27 | End: 2024-01-17

## 2024-01-17 NOTE — HISTORY OF PRESENT ILLNESS
[FreeTextEntry1] : 78 yo male with HTN, HLD, DM, PE  now off Eliquis s/p treatment, Prostate CA and rectal CA (followed by Dr. Barcenas/ Dr. Vieira) presents today for cardiac evaluation.  The patient presents for evaluation of high blood pressure. Patient is currently tolerating the current antihypertensive regime and they deny headaches, stiff neck, visual changes, pedal Edema or PND. They also are here for follow-up of elevated cholesterol and continued care of diabetes mellitus. Patient is currently tolerating medication and denies muscle pain, joint pain, back pain, tea, nausea, vomiting, abdominal pain or diarrhea. The patient is trying to follow a low cholesterol diet. The patient is following the diabetic regimen and is tolerating hypoglycemic agents and follow with endocrinology.

## 2024-01-18 LAB
ALBUMIN SERPL ELPH-MCNC: 4.9 G/DL
ALP BLD-CCNC: 84 U/L
ALT SERPL-CCNC: 17 U/L
ANION GAP SERPL CALC-SCNC: 13 MMOL/L
APO B SERPL-MCNC: 96 MG/DL
AST SERPL-CCNC: 22 U/L
BILIRUB DIRECT SERPL-MCNC: 0.1 MG/DL
BILIRUB INDIRECT SERPL-MCNC: 0.1 MG/DL
BILIRUB SERPL-MCNC: 0.2 MG/DL
BUN SERPL-MCNC: 12 MG/DL
CALCIUM SERPL-MCNC: 9.9 MG/DL
CHLORIDE SERPL-SCNC: 106 MMOL/L
CHOLEST SERPL-MCNC: 201 MG/DL
CK SERPL-CCNC: 472 U/L
CO2 SERPL-SCNC: 23 MMOL/L
CREAT SERPL-MCNC: 1.14 MG/DL
CRP SERPL HS-MCNC: 0.88 MG/L
EGFR: 66 ML/MIN/1.73M2
ESTIMATED AVERAGE GLUCOSE: 186 MG/DL
GLUCOSE SERPL-MCNC: 141 MG/DL
HBA1C MFR BLD HPLC: 8.1 %
HDLC SERPL-MCNC: 67 MG/DL
LDLC SERPL CALC-MCNC: 116 MG/DL
LDLC SERPL DIRECT ASSAY-MCNC: 115 MG/DL
NONHDLC SERPL-MCNC: 134 MG/DL
POTASSIUM SERPL-SCNC: 4.5 MMOL/L
PROT SERPL-MCNC: 7.3 G/DL
SODIUM SERPL-SCNC: 143 MMOL/L
TRIGL SERPL-MCNC: 102 MG/DL

## 2024-01-18 RX ORDER — EZETIMIBE 10 MG/1
10 TABLET ORAL
Qty: 90 | Refills: 1 | Status: ACTIVE | COMMUNITY
Start: 2024-01-18 | End: 1900-01-01

## 2024-01-25 ENCOUNTER — APPOINTMENT (OUTPATIENT)
Dept: GASTROENTEROLOGY | Facility: CLINIC | Age: 79
End: 2024-01-25
Payer: MEDICARE

## 2024-01-25 VITALS
OXYGEN SATURATION: 99 % | BODY MASS INDEX: 27.32 KG/M2 | HEIGHT: 66 IN | SYSTOLIC BLOOD PRESSURE: 154 MMHG | DIASTOLIC BLOOD PRESSURE: 75 MMHG | TEMPERATURE: 97.1 F | WEIGHT: 170 LBS | HEART RATE: 89 BPM

## 2024-01-25 PROCEDURE — ZZZZZ: CPT

## 2024-01-25 PROCEDURE — 99213 OFFICE O/P EST LOW 20 MIN: CPT

## 2024-01-25 RX ORDER — POLYETHYLENE GLYCOL-3350 AND ELECTROLYTES WITH FLAVOR PACK 240; 5.84; 2.98; 6.72; 22.72 G/278.26G; G/278.26G; G/278.26G; G/278.26G; G/278.26G
240 POWDER, FOR SOLUTION ORAL
Qty: 1 | Refills: 0 | Status: ACTIVE | COMMUNITY
Start: 2023-04-12 | End: 1900-01-01

## 2024-02-06 NOTE — ASU DISCHARGE PLAN (ADULT/PEDIATRIC) - FOR NEXT 24 HOURS DO NOT:
Nonselective enzymatic debridement performed with Santyl per physician order to wound right foot.   Patient tolerated the procedure well.     Electronically signed by Valery Vegas LPN on 2/6/2024 at 1:35 PM     Statement Selected

## 2024-02-07 NOTE — ASU PATIENT PROFILE, ADULT - NSICDXPASTMEDICALHX_GEN_ALL_CORE_FT
PAST MEDICAL HISTORY:  Ankle injury     Colon cancer Rectal on Chemo    DM (diabetes mellitus)     HLD (hyperlipidemia)     Hypercholesterolemia     Malignant melanoma     Prostate CA seed placement    Pulmonary embolism

## 2024-02-08 ENCOUNTER — TRANSCRIPTION ENCOUNTER (OUTPATIENT)
Age: 79
End: 2024-02-08

## 2024-02-08 ENCOUNTER — OUTPATIENT (OUTPATIENT)
Dept: OUTPATIENT SERVICES | Facility: HOSPITAL | Age: 79
LOS: 1 days | End: 2024-02-08
Payer: MEDICARE

## 2024-02-08 ENCOUNTER — RESULT REVIEW (OUTPATIENT)
Age: 79
End: 2024-02-08

## 2024-02-08 ENCOUNTER — APPOINTMENT (OUTPATIENT)
Dept: GASTROENTEROLOGY | Facility: HOSPITAL | Age: 79
End: 2024-02-08

## 2024-02-08 VITALS
TEMPERATURE: 98 F | HEART RATE: 77 BPM | RESPIRATION RATE: 18 BRPM | DIASTOLIC BLOOD PRESSURE: 78 MMHG | WEIGHT: 169.98 LBS | SYSTOLIC BLOOD PRESSURE: 151 MMHG | OXYGEN SATURATION: 100 % | HEIGHT: 66 IN

## 2024-02-08 VITALS
OXYGEN SATURATION: 99 % | SYSTOLIC BLOOD PRESSURE: 112 MMHG | RESPIRATION RATE: 15 BRPM | HEART RATE: 87 BPM | DIASTOLIC BLOOD PRESSURE: 62 MMHG

## 2024-02-08 DIAGNOSIS — C20 MALIGNANT NEOPLASM OF RECTUM: ICD-10-CM

## 2024-02-08 DIAGNOSIS — Z98.890 OTHER SPECIFIED POSTPROCEDURAL STATES: Chronic | ICD-10-CM

## 2024-02-08 LAB — GLUCOSE BLDC GLUCOMTR-MCNC: 166 MG/DL — HIGH (ref 70–99)

## 2024-02-08 PROCEDURE — 45380 COLONOSCOPY AND BIOPSY: CPT | Mod: 59

## 2024-02-08 PROCEDURE — 45380 COLONOSCOPY AND BIOPSY: CPT | Mod: XS

## 2024-02-08 PROCEDURE — 88305 TISSUE EXAM BY PATHOLOGIST: CPT | Mod: 26

## 2024-02-08 PROCEDURE — 45385 COLONOSCOPY W/LESION REMOVAL: CPT

## 2024-02-08 PROCEDURE — 88305 TISSUE EXAM BY PATHOLOGIST: CPT

## 2024-02-08 PROCEDURE — 82962 GLUCOSE BLOOD TEST: CPT

## 2024-02-08 NOTE — ASU PREOP CHECKLIST - ALLERGIES REVIEWED
[facility]  [address] [place] [address]  Phone: [phone]  Primary Provider: Aiyana Ortiz  Pre-op Performing Provider: JOSE HARRIS      [facility]  Surgeon: Rochelle Clifford  done

## 2024-02-12 LAB — SURGICAL PATHOLOGY STUDY: SIGNIFICANT CHANGE UP

## 2024-02-20 RX ORDER — PANTOPRAZOLE 40 MG/1
40 TABLET, DELAYED RELEASE ORAL TWICE DAILY
Qty: 180 | Refills: 1 | Status: ACTIVE | COMMUNITY
Start: 2022-04-26 | End: 1900-01-01

## 2024-02-27 ENCOUNTER — OFFICE (OUTPATIENT)
Dept: URBAN - METROPOLITAN AREA CLINIC 35 | Facility: CLINIC | Age: 79
Setting detail: OPHTHALMOLOGY
End: 2024-02-27
Payer: MEDICARE

## 2024-02-27 DIAGNOSIS — H52.4: ICD-10-CM

## 2024-02-27 DIAGNOSIS — H18.413: ICD-10-CM

## 2024-02-27 DIAGNOSIS — H11.133: ICD-10-CM

## 2024-02-27 DIAGNOSIS — H52.7: ICD-10-CM

## 2024-02-27 DIAGNOSIS — E11.9: ICD-10-CM

## 2024-02-27 DIAGNOSIS — H35.373: ICD-10-CM

## 2024-02-27 PROBLEM — H11.153 PINGUECULA; BOTH EYES: Status: ACTIVE | Noted: 2024-02-27

## 2024-02-27 PROBLEM — D31.02 NEVUS,CONJUNCTIVAL; RIGHT EYE, LEFT EYE: Status: ACTIVE | Noted: 2024-02-27

## 2024-02-27 PROBLEM — D31.01 NEVUS,CONJUNCTIVAL; RIGHT EYE, LEFT EYE: Status: ACTIVE | Noted: 2024-02-27

## 2024-02-27 PROCEDURE — 92250 FUNDUS PHOTOGRAPHY W/I&R: CPT | Performed by: OPHTHALMOLOGY

## 2024-02-27 PROCEDURE — 92004 COMPRE OPH EXAM NEW PT 1/>: CPT | Performed by: OPHTHALMOLOGY

## 2024-02-27 PROCEDURE — 92015 DETERMINE REFRACTIVE STATE: CPT | Performed by: OPHTHALMOLOGY

## 2024-02-27 ASSESSMENT — CONFRONTATIONAL VISUAL FIELD TEST (CVF)
OS_FINDINGS: FULL
OD_FINDINGS: FULL

## 2024-02-27 ASSESSMENT — REFRACTION_MANIFEST
OS_VA1: 20/30+3
OS_AXIS: 105
OD_AXIS: 091
OD_SPHERE: +0.50
OD_CYLINDER: -2.00
OS_CYLINDER: -1.75
OS_CYLINDER: -1.50
OS_SPHERE: -0.50
OD_AXIS: 090
OS_AXIS: 105
OS_SPHERE: -0.75
OS_ADD: +2.75
OD_VA1: 20/25
OD_SPHERE: +1.00
OD_VA1: 20/30
OD_CYLINDER: -1.75
OD_ADD: +2.75
OS_VA1: 20/30+2

## 2024-02-27 ASSESSMENT — SPHEQUIV_DERIVED
OD_SPHEQUIV: 0
OS_SPHEQUIV: -1.25
OS_SPHEQUIV: -1.25
OD_SPHEQUIV: -0.25
OD_SPHEQUIV: -0.375
OS_SPHEQUIV: -1.625

## 2024-02-27 ASSESSMENT — REFRACTION_AUTOREFRACTION
OD_AXIS: 091
OD_SPHERE: +0.75
OD_CYLINDER: -2.00
OS_AXIS: 105
OS_SPHERE: -0.50
OS_CYLINDER: -1.50

## 2024-03-07 ENCOUNTER — APPOINTMENT (OUTPATIENT)
Dept: GASTROENTEROLOGY | Facility: CLINIC | Age: 79
End: 2024-03-07
Payer: MEDICARE

## 2024-03-07 VITALS
DIASTOLIC BLOOD PRESSURE: 75 MMHG | OXYGEN SATURATION: 100 % | WEIGHT: 164 LBS | BODY MASS INDEX: 26.36 KG/M2 | TEMPERATURE: 97.1 F | HEIGHT: 66 IN | HEART RATE: 99 BPM | SYSTOLIC BLOOD PRESSURE: 142 MMHG

## 2024-03-07 DIAGNOSIS — K62.9 DISEASE OF ANUS AND RECTUM, UNSPECIFIED: ICD-10-CM

## 2024-03-07 PROCEDURE — 99213 OFFICE O/P EST LOW 20 MIN: CPT

## 2024-03-08 PROBLEM — K62.9 RECTAL LESION: Status: ACTIVE | Noted: 2022-04-18

## 2024-03-08 NOTE — ASSESSMENT
[FreeTextEntry1] : 78M with pmhx of PE, HTN, HLD, DM, prostate cancer, rectal cancer s/p chemoradiation presenting for follow-up. Had colonoscopy showing rectal scar, biopsies showed adenoma. Denies any other complaints, no abd pain, n/v/d/c, melena, hematochezia, fever/chills, jaundice, dark urine, or other issues. - Discussed rectal scar concerning for harboring at least adenoma which is likely poorly amenable to endoscopic resection given his known hx of rectal cancer. Given mucosally adenoma, also recommended MRI pelvis to reevaluate rectal wall and assess if underlying cancer still present. Of note, pt with hx of poor f/u. Expressed importance of following up with his oncologist and surgeon (numbers provided) to reestablish care. - F/u MRI pelvis. - F/u with surgery and oncology.

## 2024-03-08 NOTE — PHYSICAL EXAM
[Alert] : alert [Normal Voice/Communication] : normal voice/communication [Healthy Appearing] : healthy appearing [No Acute Distress] : no acute distress [Sclera] : the sclera and conjunctiva were normal [Hearing Threshold Finger Rub Not [place]] : hearing was normal [Normal Lips/Gums] : the lips and gums were normal [Oropharynx] : the oropharynx was normal [Normal Appearance] : the appearance of the neck was normal [No Neck Mass] : no neck mass was observed [No Respiratory Distress] : no respiratory distress [No Acc Muscle Use] : no accessory muscle use [Respiration, Rhythm And Depth] : normal respiratory rhythm and effort [Auscultation Breath Sounds / Voice Sounds] : lungs were clear to auscultation bilaterally [Heart Rate And Rhythm] : heart rate was normal and rhythm regular [Normal S1, S2] : normal S1 and S2 [Murmurs] : no murmurs [Bowel Sounds] : normal bowel sounds [No Masses] : no abdominal mass palpated [Abdomen Tenderness] : non-tender [Abdomen Soft] : soft [] : no hepatosplenomegaly [Oriented To Time, Place, And Person] : oriented to person, place, and time

## 2024-03-08 NOTE — PHYSICAL EXAM
[Alert] : alert [Normal Voice/Communication] : normal voice/communication [No Acute Distress] : no acute distress [Healthy Appearing] : healthy appearing [Sclera] : the sclera and conjunctiva were normal [Hearing Threshold Finger Rub Not [place]] : hearing was normal [Normal Lips/Gums] : the lips and gums were normal [Oropharynx] : the oropharynx was normal [Normal Appearance] : the appearance of the neck was normal [No Neck Mass] : no neck mass was observed [No Respiratory Distress] : no respiratory distress [No Acc Muscle Use] : no accessory muscle use [Respiration, Rhythm And Depth] : normal respiratory rhythm and effort [Auscultation Breath Sounds / Voice Sounds] : lungs were clear to auscultation bilaterally [Normal S1, S2] : normal S1 and S2 [Heart Rate And Rhythm] : heart rate was normal and rhythm regular [Murmurs] : no murmurs [Bowel Sounds] : normal bowel sounds [No Masses] : no abdominal mass palpated [Abdomen Tenderness] : non-tender [Abdomen Soft] : soft [] : no hepatosplenomegaly [Oriented To Time, Place, And Person] : oriented to person, place, and time

## 2024-03-08 NOTE — HISTORY OF PRESENT ILLNESS
[FreeTextEntry1] : 78M with pmhx of PE, HTN, HLD, DM, prostate cancer, rectal cancer s/p chemoradiation presenting for follow-up. Had colonoscopy showing rectal scar, biopsies showed adenoma. Denies any other complaints, no abd pain, n/v/d/c, melena, hematochezia, fever/chills, jaundice, dark urine, or other issues.

## 2024-03-08 NOTE — PHYSICAL EXAM
[Alert] : alert [Normal Voice/Communication] : normal voice/communication [Healthy Appearing] : healthy appearing [No Acute Distress] : no acute distress [Sclera] : the sclera and conjunctiva were normal [Normal Lips/Gums] : the lips and gums were normal [Hearing Threshold Finger Rub Not [place]] : hearing was normal [Oropharynx] : the oropharynx was normal [Normal Appearance] : the appearance of the neck was normal [No Neck Mass] : no neck mass was observed [No Respiratory Distress] : no respiratory distress [No Acc Muscle Use] : no accessory muscle use [Auscultation Breath Sounds / Voice Sounds] : lungs were clear to auscultation bilaterally [Respiration, Rhythm And Depth] : normal respiratory rhythm and effort [Heart Rate And Rhythm] : heart rate was normal and rhythm regular [Normal S1, S2] : normal S1 and S2 [Murmurs] : no murmurs [Bowel Sounds] : normal bowel sounds [No Masses] : no abdominal mass palpated [Abdomen Tenderness] : non-tender [Abdomen Soft] : soft [] : no hepatosplenomegaly [Oriented To Time, Place, And Person] : oriented to person, place, and time

## 2024-03-14 ENCOUNTER — OFFICE (OUTPATIENT)
Dept: URBAN - METROPOLITAN AREA CLINIC 32 | Facility: CLINIC | Age: 79
Setting detail: OPHTHALMOLOGY
End: 2024-03-14

## 2024-03-14 ENCOUNTER — APPOINTMENT (OUTPATIENT)
Dept: CARDIOLOGY | Facility: CLINIC | Age: 79
End: 2024-03-14
Payer: MEDICARE

## 2024-03-14 VITALS
SYSTOLIC BLOOD PRESSURE: 120 MMHG | OXYGEN SATURATION: 98 % | TEMPERATURE: 98 F | DIASTOLIC BLOOD PRESSURE: 80 MMHG | HEART RATE: 84 BPM | HEIGHT: 66 IN
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
## 2024-03-14 PROCEDURE — NO SHOW FE NO SHOW FEE: Performed by: OPHTHALMOLOGY

## 2024-03-14 PROCEDURE — 99213 OFFICE O/P EST LOW 20 MIN: CPT

## 2024-03-14 PROCEDURE — G2211 COMPLEX E/M VISIT ADD ON: CPT

## 2024-03-14 PROCEDURE — 93000 ELECTROCARDIOGRAM COMPLETE: CPT

## 2024-03-14 RX ORDER — ATORVASTATIN CALCIUM 40 MG/1
40 TABLET, FILM COATED ORAL
Qty: 90 | Refills: 1 | Status: ACTIVE | COMMUNITY
Start: 2023-01-11 | End: 1900-01-01

## 2024-03-14 NOTE — HISTORY OF PRESENT ILLNESS
[FreeTextEntry1] : This is a 78 year y/o male with a PMHx of HTN, HLD, DM, PE now off Eliquis s/p treatment, Prostate CA and rectal CA (followed by Dr. Barcenas/ Dr. Vieira)  presents today for follow up.   Of note, pt was last here 2 months ago with elevated BP. Was started on lisinopril 5mg, has been tolerating well. The patient is here for evaluation of high blood pressure. Currently tolerating antihypertensive medications. Denies headaches, stiff neck, visual changes, or PND. The patient has been trying to stay on a low-sodium diet.  The patient also presents for continued care of diabetes mellitus. Patient is following recommended diabetic regimen. Tolerating hypoglycemic agents and diet recommendations. Denies visual changes, confusion, palpitations, tremors, diaphoresis, blood in the urine, abdominal pain, nausea, vomiting, myalgias, arthralgias, paresthesias and syncope. Too denies any chest discomfort, shortness of breath or new neurologic signs or symptoms. Also denies any polyuria, worsening nocturia, or polydipsia.  The patient is here for follow-up of elevated cholesterol. Currently tolerating prescribed medications. Denies muscle pain, joint pain, back pain, urinary changes, nausea, vomiting, abdominal pain or diarrhea. The patient is trying to follow a low cholesterol diet.

## 2024-03-14 NOTE — PHYSICAL EXAM
[Well Developed] : well developed [Well Nourished] : well nourished [No Acute Distress] : no acute distress [Normal Conjunctiva] : normal conjunctiva [No Carotid Bruit] : no carotid bruit [Normal Venous Pressure] : normal venous pressure [Normal S1, S2] : normal S1, S2 [No Murmur] : no murmur [No Rub] : no rub [Clear Lung Fields] : clear lung fields [No Gallop] : no gallop [No Respiratory Distress] : no respiratory distress  [Good Air Entry] : good air entry [Soft] : abdomen soft [Non Tender] : non-tender [No Masses/organomegaly] : no masses/organomegaly [Normal Bowel Sounds] : normal bowel sounds [Normal Gait] : normal gait [No Edema] : no edema [No Cyanosis] : no cyanosis [No Clubbing] : no clubbing [No Varicosities] : no varicosities [No Rash] : no rash [No Skin Lesions] : no skin lesions [Moves all extremities] : moves all extremities [No Focal Deficits] : no focal deficits [Normal Speech] : normal speech [Alert and Oriented] : alert and oriented [Normal memory] : normal memory

## 2024-03-15 LAB
ALBUMIN SERPL ELPH-MCNC: 4.8 G/DL
ALP BLD-CCNC: 64 U/L
ALT SERPL-CCNC: 25 U/L
ANION GAP SERPL CALC-SCNC: 17 MMOL/L
APO B SERPL-MCNC: 64 MG/DL
AST SERPL-CCNC: 39 U/L
BILIRUB DIRECT SERPL-MCNC: 0.1 MG/DL
BILIRUB INDIRECT SERPL-MCNC: 0.1 MG/DL
BILIRUB SERPL-MCNC: 0.2 MG/DL
BUN SERPL-MCNC: 25 MG/DL
CALCIUM SERPL-MCNC: 10.2 MG/DL
CHLORIDE SERPL-SCNC: 105 MMOL/L
CHOLEST SERPL-MCNC: 130 MG/DL
CK SERPL-CCNC: 1245 U/L
CO2 SERPL-SCNC: 19 MMOL/L
CREAT SERPL-MCNC: 1.36 MG/DL
CRP SERPL HS-MCNC: 2.39 MG/L
EGFR: 53 ML/MIN/1.73M2
ESTIMATED AVERAGE GLUCOSE: 194 MG/DL
GLUCOSE SERPL-MCNC: 158 MG/DL
HBA1C MFR BLD HPLC: 8.4 %
HDLC SERPL-MCNC: 60 MG/DL
LDLC SERPL CALC-MCNC: 56 MG/DL
LDLC SERPL DIRECT ASSAY-MCNC: 53 MG/DL
NONHDLC SERPL-MCNC: 71 MG/DL
POTASSIUM SERPL-SCNC: 5.5 MMOL/L
PROT SERPL-MCNC: 7.7 G/DL
SODIUM SERPL-SCNC: 142 MMOL/L
TRIGL SERPL-MCNC: 77 MG/DL

## 2024-03-18 ENCOUNTER — OFFICE (OUTPATIENT)
Dept: URBAN - METROPOLITAN AREA CLINIC 32 | Facility: CLINIC | Age: 79
Setting detail: OPHTHALMOLOGY
End: 2024-03-18
Payer: MEDICARE

## 2024-03-18 DIAGNOSIS — H35.373: ICD-10-CM

## 2024-03-18 DIAGNOSIS — E11.3553: ICD-10-CM

## 2024-03-18 PROCEDURE — 99213 OFFICE O/P EST LOW 20 MIN: CPT | Performed by: OPHTHALMOLOGY

## 2024-03-18 PROCEDURE — 92235 FLUORESCEIN ANGRPH MLTIFRAME: CPT | Performed by: OPHTHALMOLOGY

## 2024-03-18 PROCEDURE — 92201 OPSCPY EXTND RTA DRAW UNI/BI: CPT | Performed by: OPHTHALMOLOGY

## 2024-03-18 PROCEDURE — 92134 CPTRZ OPH DX IMG PST SGM RTA: CPT | Performed by: OPHTHALMOLOGY

## 2024-03-20 ENCOUNTER — OUTPATIENT (OUTPATIENT)
Dept: OUTPATIENT SERVICES | Facility: HOSPITAL | Age: 79
LOS: 1 days | End: 2024-03-20
Payer: MEDICARE

## 2024-03-20 ENCOUNTER — APPOINTMENT (OUTPATIENT)
Dept: MRI IMAGING | Facility: CLINIC | Age: 79
End: 2024-03-20
Payer: MEDICARE

## 2024-03-20 DIAGNOSIS — Z98.890 OTHER SPECIFIED POSTPROCEDURAL STATES: Chronic | ICD-10-CM

## 2024-03-20 DIAGNOSIS — C20 MALIGNANT NEOPLASM OF RECTUM: ICD-10-CM

## 2024-03-20 PROCEDURE — 72197 MRI PELVIS W/O & W/DYE: CPT | Mod: 26

## 2024-03-20 PROCEDURE — 72197 MRI PELVIS W/O & W/DYE: CPT

## 2024-03-20 PROCEDURE — A9585: CPT

## 2024-03-27 ENCOUNTER — APPOINTMENT (OUTPATIENT)
Dept: SURGICAL ONCOLOGY | Facility: CLINIC | Age: 79
End: 2024-03-27
Payer: MEDICARE

## 2024-03-27 PROCEDURE — 99214 OFFICE O/P EST MOD 30 MIN: CPT

## 2024-04-02 ENCOUNTER — APPOINTMENT (OUTPATIENT)
Dept: PULMONOLOGY | Facility: CLINIC | Age: 79
End: 2024-04-02
Payer: MEDICARE

## 2024-04-02 VITALS — OXYGEN SATURATION: 99 % | DIASTOLIC BLOOD PRESSURE: 69 MMHG | SYSTOLIC BLOOD PRESSURE: 136 MMHG | HEART RATE: 73 BPM

## 2024-04-02 PROCEDURE — 99214 OFFICE O/P EST MOD 30 MIN: CPT | Mod: 25

## 2024-04-02 PROCEDURE — ZZZZZ: CPT

## 2024-04-02 PROCEDURE — 94729 DIFFUSING CAPACITY: CPT

## 2024-04-02 PROCEDURE — 94727 GAS DIL/WSHOT DETER LNG VOL: CPT

## 2024-04-02 PROCEDURE — 94010 BREATHING CAPACITY TEST: CPT

## 2024-04-02 NOTE — HISTORY OF PRESENT ILLNESS
[Never] : never [TextBox_4] : doing well since last visit has been off ac for PE hx had ggo on ct chest last year, needs fu no complaints denies sob/cough/wheeze/cp

## 2024-04-02 NOTE — ASSESSMENT
[FreeTextEntry1] : obtain repeat chest ct in June to fu GGO otherwise doing well from pulmonary perspective

## 2024-04-02 NOTE — PROCEDURE
[de-identified] : The patient is a 59-year-old male for a reevaluation of his left knee.  He is still having pain in the knee, he points laterally and posteriorly as to where the pain is.  He has not had any formal physical therapy for this.  He has tried nabumetone with no relief. [FreeTextEntry1] : PFT: Normal spirometry.  Lung volumes normal. DLCO normal.   Reviewed:   	 EXAM: [id number] - CT ANGIO CHEST PULM ART WAWIC  - ORDERED BY: KEENAN COLIN   PROCEDURE DATE:  06/22/2023    INTERPRETATION:  INDICATION: Follow-up pulmonary embolism after 3 months of anticoagulation  TECHNIQUE: Helical acquisition of the chest after the administration of 70 mL of Omnipaque 350. Maximum intensity projection images were generated.  COMPARISON: CT chest 3/9/2023.  FINDINGS:  PULMONARY ANGIOGRAM: Resolved prior pulmonary emboli. No new pulmonary embolism  LUNGS/AIRWAYS/PLEURA: No endobronchial lesion. Unchanged left upper lobe 7 mm groundglass nodule (2-38). No pleural abnormality.  LYMPH NODES/MEDIASTINUM: Unremarkable.  HEART/VASCULATURE: Normal heart size. No pericardial effusion. Coronary artery calcifications. Normal caliber aorta. Right chest port catheter tip at the superior cavoatrial junction.  UPPER ABDOMEN: Bilateral renal cysts.  BONES/SOFT TISSUES: Multilevel spondylosis.   IMPRESSION:  No pulmonary embolism.  Unchanged left upper lobe 7 mm groundglass nodule. Recommend surveillance chest CT in one year.  --- End of Report ---       DAIN PATEL M.D., ATTENDING RADIOLOGIST This document has been electronically signed. Jun 23 2023  5:34AM  PFT: Normal spirometry.  Lung volumes normal. DLCO normal.   Report date: 3/9/2023  AM: [id number] - CT ABDOMEN AND PELVIS IC - ORDERED BY: JOSE BARCENAS  EXAM: [id number] - CT CHEST IC - ORDERED BY: JOSE BARCENAS   PROCEDURE DATE: 03/09/2023    INTERPRETATION: CLINICAL INFORMATION: 77-year-old man with rectal cancer  COMPARISON: CT 05/12/2022  CONTRAST/COMPLICATIONS: IV Contrast: Omnipaque 350 90 cc administered 10 cc discarded Oral Contrast: Smoothie Readi-Cat 2 Complications: None reported at time of study completion  PROCEDURE: CT of the Chest, Abdomen and Pelvis was performed. Sagittal and coronal reformats were performed.  FINDINGS: CHEST: LUNGS AND LARGE AIRWAYS: Patent central airways. No pulmonary nodules. PLEURA: No pleural effusion. VESSELS: Pulmonary emboli lingula segmental and right lower lobe subsegmental branches. HEART: Heart size is normal. No pericardial effusion. Coronary artery calcification MEDIASTINUM AND OMAR: No lymphadenopathy. CHEST WALL AND LOWER NECK: Mediport right chest wall with catheter ending at cavoatrial junction.  ABDOMEN AND PELVIS: LIVER: Within normal limits. BILE DUCTS: Normal caliber. GALLBLADDER: Within normal limits. SPLEEN: Within normal limits. PANCREAS: Within normal limits. ADRENALS: Within normal limits. KIDNEYS/URETERS: Bilateral cysts and subcentimeter hypodensities.  BLADDER: Within normal limits. REPRODUCTIVE ORGANS: Normal size prostate with radiation seeds.  BOWEL: No bowel obstruction. Perirectal soft tissue stranding and mild mural thickening. Appendix not visualized. PERITONEUM: No ascites. Unchanged omental calcification. VESSELS: Within normal limits. RETROPERITONEUM/LYMPH NODES: No lymphadenopathy. ABDOMINAL WALL: Within normal limits. BONES: Degenerative changes.  IMPRESSION: Mild perirectal soft tissue stranding and mural thickening. No metastatic lesions identified. Segmental and subsegmental pulmonary emboli  Findings discussed with Dr. Barcenas on 03/09/2023 at 7:00 PM with read back. There is  --- End of Report ---

## 2024-04-10 ENCOUNTER — APPOINTMENT (OUTPATIENT)
Dept: SURGICAL ONCOLOGY | Facility: CLINIC | Age: 79
End: 2024-04-10

## 2024-04-30 NOTE — ASSESSMENT
[FreeTextEntry1] : IMP: 77 year old male w/ rectal cancer Stage T3c N0. CRM: involved. Sphincter involvement: absent   S/p neoadjuvant RT concluded ~6/6/22- short course radiation   Patient completed neoadjuvant therapy regimen with short course RT followed by 12-16 weeks of FOLFOX.  pre-op restaging imaging from 3/9/2023 showed incidental finding of segmental & subsegmental PE -- pt sent to ED.  repeat EUS 5/11/2023- shows only rectal scar and bx shows now evidence of adeno  6/13/2023- Ildefonso's case was discussed today at tumor board, repeat EUS shows complete response. No need for surgery at this point  * had recent biopsy of a left plantar mole by his podiatrist with path showing superficial melanoma -- will refer to Dr. gumaro Durant   3/20/24 MR Pel/Anal:  Complete/near complete response Suspicious Mesorectal Lymph Nodes: No Suspicious Extra Mesorectal Lymph Nodes: No   PLAN:  -MRI and path suggests complete response. Offered pt resection for path confirmation and/or close surveillancea nd  pt prefers and requests close surveillance and is very hesitant for surgery- will cont close surveillance -will need repeat scopes Q3 months, discussed with Dr. Pressley and Dr. Nichols.  RTO 2 weeks  I have discussed the diagnosis, therapeutic plan and options with the patient at length. Patient expressed verbal understanding to proceed with proposed plan. All questions answered.

## 2024-04-30 NOTE — HISTORY OF PRESENT ILLNESS
[de-identified] : Mr. REBA WHITLOCK is a 77 year old male who present today for follow up visit for rectal cancer.   Referred by Dr. Pressley  [facility]: HTN, HLD, DM, prostate cancer  Family History: breast cancer in sister   Endoscopy and Colonoscopy 3/23/22: Rectal mass pending final pathology   EUS 4/26/22: T3 N0    MRI pelvis 4/26/22: 3.0 cm right anterior mid to lower rectal tumor located 5.5 cm from the anal verge and 1.2 cm from the top anal sphincter. 6 mm extension beyond the muscularis propria along the right anterior wall reaching the circumferential resection margin. Stage T3 N0. CRM: involved. Sphincter: absent   CT chest/abdomen/pelvis performed on 5/12/22 showed no evidence of metastatic disease.  Patient presented at rectal tumor board.  He began neoadjuvant radiation on 6/1/22 under the care of Dr. Vieira, completed treatment 6/7/2022. Plans to have port placed & start FOLFOX with Dr. Daniel.  Patient is on total neoadjuvant therapy regimen with short course RT followed by 12-16 weeks of FOLFOX, will restage after   1/4/23 states feeling very weak, after chemo therapy, he was [facility] to [facility] due to dehydration. He has completed radiation and chemo therapy. Restaging imaging CT C/A/P & MR mass protocol now; completed chemotherapy with Dr. Daniel and radiation. Patient is in need of pre habitation prior to surgery referred to Dr. He for PT. He is current;y ECOG 1-2  RTO 2 weeks   3/14/2023- Discussed with patient that given the new finding of a PE, I would personally prefer to defer surgery for future given risk of PE progression and significant bleeding risk on anticoagulation in the perioperative period and continue with neoadjuvant therapy until the PE resolves however, will represent his case at GI tumor board for group consensus. Patient in agreement.  Case presented at [facility] GI tumor board- consensus to hold off on surgery at this point Reassess for systemic therapy Repeat EUS to assess for treatment response RTO after EUS  6/13/2023- Navjot case was discussed today at tumor board, repeat EUS shows complete response. No need for surgery at this point   3/20/24 MR Pel/Anal:  Complete/near complete response Suspicious Mesorectal Lymph Nodes: No Suspicious Extra Mesorectal Lymph Nodes: No

## 2024-04-30 NOTE — CONSULT LETTER
[Dear  ___] : Dear  [unfilled], [Courtesy Letter:] : I had the pleasure of seeing your patient, [unfilled], in my office today. [( Thank you for referring [unfilled] for consultation for _____ )] : Thank you for referring [unfilled] for consultation for [unfilled] [Please see my note below.] : Please see my note below. [Consult Closing:] : Thank you very much for allowing me to participate in the care of this patient.  If you have any questions, please do not hesitate to contact me. [Sincerely,] : Sincerely, [DrMichel  ___] : Dr. FORRESTER [DrMichel ___] : Dr. FORRESTER [FreeTextEntry2] : Trey Pressley MD [FreeTextEntry3] : Pernell Barcenas MD, HIWOT, FACS Director of Surgical Oncology- [facility] , Department of Surgery [facility] [place] [id number] Ph: [phone] Cell: [phone]

## 2024-05-30 ENCOUNTER — APPOINTMENT (OUTPATIENT)
Dept: ENDOCRINOLOGY | Facility: CLINIC | Age: 79
End: 2024-05-30
Payer: MEDICARE

## 2024-05-30 VITALS
OXYGEN SATURATION: 99 % | SYSTOLIC BLOOD PRESSURE: 142 MMHG | DIASTOLIC BLOOD PRESSURE: 74 MMHG | TEMPERATURE: 98 F | WEIGHT: 160 LBS | HEART RATE: 75 BPM | HEIGHT: 66 IN | BODY MASS INDEX: 25.71 KG/M2

## 2024-05-30 DIAGNOSIS — E83.52 HYPERCALCEMIA: ICD-10-CM

## 2024-05-30 LAB
GLUCOSE BLDC GLUCOMTR-MCNC: 239
HBA1C MFR BLD HPLC: ABNORMAL

## 2024-05-30 PROCEDURE — 83036 HEMOGLOBIN GLYCOSYLATED A1C: CPT | Mod: QW

## 2024-05-30 PROCEDURE — 82962 GLUCOSE BLOOD TEST: CPT

## 2024-05-30 PROCEDURE — 99214 OFFICE O/P EST MOD 30 MIN: CPT

## 2024-05-30 RX ORDER — LANCETS 33 GAUGE
EACH MISCELLANEOUS
Qty: 180 | Refills: 2 | Status: ACTIVE | COMMUNITY
Start: 2023-02-16 | End: 1900-01-01

## 2024-05-30 RX ORDER — BLOOD SUGAR DIAGNOSTIC
STRIP MISCELLANEOUS TWICE DAILY
Qty: 180 | Refills: 2 | Status: ACTIVE | COMMUNITY
Start: 2023-02-16 | End: 1900-01-01

## 2024-05-30 RX ORDER — GLIPIZIDE 10 MG/1
10 TABLET ORAL DAILY
Qty: 270 | Refills: 1 | Status: ACTIVE | COMMUNITY
Start: 2023-01-11 | End: 1900-01-01

## 2024-05-30 RX ORDER — BLOOD-GLUCOSE METER
70 EACH MISCELLANEOUS
Qty: 360 | Refills: 2 | Status: ACTIVE | COMMUNITY
Start: 2023-02-16 | End: 1900-01-01

## 2024-05-30 RX ORDER — GLUCOSAM/CHON-MSM1/C/MANG/BOSW 500-416.6
TABLET ORAL
Qty: 1 | Refills: 0 | Status: ACTIVE | COMMUNITY
Start: 2023-02-16 | End: 1900-01-01

## 2024-05-30 RX ORDER — METFORMIN HYDROCHLORIDE 1000 MG/1
1000 TABLET, COATED ORAL TWICE DAILY
Qty: 180 | Refills: 1 | Status: ACTIVE | COMMUNITY
Start: 2023-01-11 | End: 1900-01-01

## 2024-05-30 NOTE — PHYSICAL EXAM
[Alert] : alert [Well Nourished] : well nourished [No Acute Distress] : no acute distress [Well Developed] : well developed [Normal Sclera/Conjunctiva] : normal sclera/conjunctiva [EOMI] : extra ocular movement intact [No Proptosis] : no proptosis [Normal Oropharynx] : the oropharynx was normal [Thyroid Not Enlarged] : the thyroid was not enlarged [No Respiratory Distress] : no respiratory distress [No Accessory Muscle Use] : no accessory muscle use [Clear to Auscultation] : lungs were clear to auscultation bilaterally [Normal S1, S2] : normal S1 and S2 [Normal Rate] : heart rate was normal [Regular Rhythm] : with a regular rhythm [No Edema] : no peripheral edema [Pedal Pulses Normal] : the pedal pulses are present [Normal Bowel Sounds] : normal bowel sounds [Not Tender] : non-tender [Not Distended] : not distended [Soft] : abdomen soft [Normal Anterior Cervical Nodes] : no anterior cervical lymphadenopathy [Spine Straight] : spine straight [Kyphosis] : kyphosis present [No Stigmata of Cushings Syndrome] : no stigmata of Cushings Syndrome [No Involuntary Movements] : no involuntary movements were seen [Normal Strength/Tone] : muscle strength and tone were normal [No Rash] : no rash [Normal Reflexes] : deep tendon reflexes were 2+ and symmetric [No Tremors] : no tremors [Oriented x3] : oriented to person, place, and time [Acanthosis Nigricans] : no acanthosis nigricans [de-identified] : walks with cane

## 2024-05-30 NOTE — HISTORY OF PRESENT ILLNESS
[FreeTextEntry1] : 78 yearM here for f/up for Type 2 diabetes mellitus  Patient with PMHx as below, remarkable for : HTN, HLD, DM, prostate cancer, rectal cancer s/p chemo and RT   Screening  Ophthalmology: follows  Podiatry: follows, has appointment today LDL: on lipitor 40mg po daily   EGFR: 70    Current diabetic medication regimen (verified with patient):  glipizide 10mg po 2 tabs with breakfast and 1 tab with dinner  metformin 1g po bid   Did not get jardiance due to insurance issues. Reports copay too high on farxiga and did not obtain.     SMBG ranges (glucometer): reported  am: <140mg/dl    Hypoglycemia:     POCT: 239mg/dl

## 2024-06-24 ENCOUNTER — APPOINTMENT (OUTPATIENT)
Dept: CT IMAGING | Facility: CLINIC | Age: 79
End: 2024-06-24
Payer: MEDICARE

## 2024-06-24 ENCOUNTER — OUTPATIENT (OUTPATIENT)
Dept: OUTPATIENT SERVICES | Facility: HOSPITAL | Age: 79
LOS: 1 days | End: 2024-06-24
Payer: MEDICARE

## 2024-06-24 DIAGNOSIS — Z98.890 OTHER SPECIFIED POSTPROCEDURAL STATES: Chronic | ICD-10-CM

## 2024-06-24 DIAGNOSIS — R91.1 SOLITARY PULMONARY NODULE: ICD-10-CM

## 2024-06-24 PROCEDURE — 71250 CT THORAX DX C-: CPT

## 2024-06-24 PROCEDURE — 71250 CT THORAX DX C-: CPT | Mod: 26

## 2024-06-27 ENCOUNTER — APPOINTMENT (OUTPATIENT)
Dept: CARDIOLOGY | Facility: CLINIC | Age: 79
End: 2024-06-27
Payer: MEDICARE

## 2024-06-27 ENCOUNTER — NON-APPOINTMENT (OUTPATIENT)
Age: 79
End: 2024-06-27

## 2024-06-27 VITALS
DIASTOLIC BLOOD PRESSURE: 70 MMHG | WEIGHT: 157 LBS | OXYGEN SATURATION: 99 % | HEIGHT: 66 IN | BODY MASS INDEX: 25.23 KG/M2 | HEART RATE: 68 BPM | SYSTOLIC BLOOD PRESSURE: 122 MMHG

## 2024-06-27 DIAGNOSIS — E11.9 TYPE 2 DIABETES MELLITUS W/OUT COMPLICATIONS: ICD-10-CM

## 2024-06-27 DIAGNOSIS — C20 MALIGNANT NEOPLASM OF RECTUM: ICD-10-CM

## 2024-06-27 DIAGNOSIS — R51.9 HEADACHE, UNSPECIFIED: ICD-10-CM

## 2024-06-27 DIAGNOSIS — E87.5 HYPERKALEMIA: ICD-10-CM

## 2024-06-27 DIAGNOSIS — R91.1 SOLITARY PULMONARY NODULE: ICD-10-CM

## 2024-06-27 DIAGNOSIS — E78.5 HYPERLIPIDEMIA, UNSPECIFIED: ICD-10-CM

## 2024-06-27 DIAGNOSIS — I10 ESSENTIAL (PRIMARY) HYPERTENSION: ICD-10-CM

## 2024-06-27 DIAGNOSIS — R79.89 OTHER SPECIFIED ABNORMAL FINDINGS OF BLOOD CHEMISTRY: ICD-10-CM

## 2024-06-27 PROCEDURE — 99214 OFFICE O/P EST MOD 30 MIN: CPT

## 2024-06-27 PROCEDURE — 93000 ELECTROCARDIOGRAM COMPLETE: CPT

## 2024-06-28 ENCOUNTER — NON-APPOINTMENT (OUTPATIENT)
Age: 79
End: 2024-06-28

## 2024-06-28 ENCOUNTER — EMERGENCY (EMERGENCY)
Facility: HOSPITAL | Age: 79
LOS: 1 days | Discharge: HOME | End: 2024-06-28
Attending: EMERGENCY MEDICINE
Payer: MEDICARE

## 2024-06-28 VITALS
DIASTOLIC BLOOD PRESSURE: 83 MMHG | HEART RATE: 84 BPM | TEMPERATURE: 98 F | SYSTOLIC BLOOD PRESSURE: 161 MMHG | OXYGEN SATURATION: 95 % | WEIGHT: 164.91 LBS | RESPIRATION RATE: 18 BRPM | HEIGHT: 65 IN

## 2024-06-28 VITALS
RESPIRATION RATE: 20 BRPM | HEART RATE: 20 BPM | TEMPERATURE: 98 F | DIASTOLIC BLOOD PRESSURE: 73 MMHG | OXYGEN SATURATION: 98 % | SYSTOLIC BLOOD PRESSURE: 135 MMHG

## 2024-06-28 DIAGNOSIS — Z98.890 OTHER SPECIFIED POSTPROCEDURAL STATES: Chronic | ICD-10-CM

## 2024-06-28 LAB
ALBUMIN SERPL ELPH-MCNC: 4.4 G/DL — SIGNIFICANT CHANGE UP (ref 3.3–5)
ALBUMIN SERPL ELPH-MCNC: 4.6 G/DL
ALP BLD-CCNC: 76 U/L
ALP SERPL-CCNC: 74 U/L — SIGNIFICANT CHANGE UP (ref 40–120)
ALT FLD-CCNC: 17 U/L — SIGNIFICANT CHANGE UP (ref 10–45)
ALT SERPL-CCNC: 17 U/L
ANION GAP SERPL CALC-SCNC: 13 MMOL/L
ANION GAP SERPL CALC-SCNC: 14 MMOL/L — SIGNIFICANT CHANGE UP (ref 5–17)
APO B SERPL-MCNC: 136 MG/DL
AST SERPL-CCNC: 23 U/L — SIGNIFICANT CHANGE UP (ref 10–40)
AST SERPL-CCNC: 24 U/L
BASE EXCESS BLDV CALC-SCNC: -0.6 MMOL/L — SIGNIFICANT CHANGE UP (ref -2–3)
BASOPHILS # BLD AUTO: 0.03 K/UL — SIGNIFICANT CHANGE UP (ref 0–0.2)
BASOPHILS NFR BLD AUTO: 0.5 % — SIGNIFICANT CHANGE UP (ref 0–2)
BILIRUB DIRECT SERPL-MCNC: 0 MG/DL
BILIRUB INDIRECT SERPL-MCNC: 0.1 MG/DL
BILIRUB SERPL-MCNC: <0.1 MG/DL — LOW (ref 0.2–1.2)
BILIRUB SERPL-MCNC: <0.2 MG/DL
BUN SERPL-MCNC: 33 MG/DL
BUN SERPL-MCNC: 39 MG/DL — HIGH (ref 7–23)
CA-I SERPL-SCNC: 1.3 MMOL/L — SIGNIFICANT CHANGE UP (ref 1.15–1.33)
CALCIUM SERPL-MCNC: 10 MG/DL — SIGNIFICANT CHANGE UP (ref 8.4–10.5)
CALCIUM SERPL-MCNC: 10.2 MG/DL
CHLORIDE BLDV-SCNC: 104 MMOL/L — SIGNIFICANT CHANGE UP (ref 96–108)
CHLORIDE SERPL-SCNC: 103 MMOL/L
CHLORIDE SERPL-SCNC: 105 MMOL/L — SIGNIFICANT CHANGE UP (ref 96–108)
CHOLEST SERPL-MCNC: 278 MG/DL
CK SERPL-CCNC: 528 U/L
CO2 BLDV-SCNC: 27 MMOL/L — HIGH (ref 22–26)
CO2 SERPL-SCNC: 21 MMOL/L
CO2 SERPL-SCNC: 21 MMOL/L — LOW (ref 22–31)
CREAT SERPL-MCNC: 1.37 MG/DL — HIGH (ref 0.5–1.3)
CREAT SERPL-MCNC: 1.41 MG/DL
CRP SERPL HS-MCNC: 12.67 MG/L
EGFR: 51 ML/MIN/1.73M2
EGFR: 52 ML/MIN/1.73M2 — LOW
EOSINOPHIL # BLD AUTO: 0.08 K/UL — SIGNIFICANT CHANGE UP (ref 0–0.5)
EOSINOPHIL NFR BLD AUTO: 1.3 % — SIGNIFICANT CHANGE UP (ref 0–6)
ESTIMATED AVERAGE GLUCOSE: 160 MG/DL
GAS PNL BLDV: 138 MMOL/L — SIGNIFICANT CHANGE UP (ref 136–145)
GAS PNL BLDV: SIGNIFICANT CHANGE UP
GAS PNL BLDV: SIGNIFICANT CHANGE UP
GLUCOSE BLDV-MCNC: 113 MG/DL — HIGH (ref 70–99)
GLUCOSE SERPL-MCNC: 117 MG/DL — HIGH (ref 70–99)
GLUCOSE SERPL-MCNC: 210 MG/DL
HBA1C MFR BLD HPLC: 7.2 %
HCO3 BLDV-SCNC: 26 MMOL/L — SIGNIFICANT CHANGE UP (ref 22–29)
HCT VFR BLD CALC: 36.7 % — LOW (ref 39–50)
HCT VFR BLDA CALC: 36 % — LOW (ref 39–51)
HDLC SERPL-MCNC: 73 MG/DL
HGB BLD CALC-MCNC: 11.9 G/DL — LOW (ref 12.6–17.4)
HGB BLD-MCNC: 11.5 G/DL — LOW (ref 13–17)
IMM GRANULOCYTES NFR BLD AUTO: 0.3 % — SIGNIFICANT CHANGE UP (ref 0–0.9)
LACTATE BLDV-MCNC: 3.2 MMOL/L — HIGH (ref 0.5–2)
LDLC SERPL CALC-MCNC: 189 MG/DL
LDLC SERPL DIRECT ASSAY-MCNC: 181 MG/DL
LYMPHOCYTES # BLD AUTO: 1.16 K/UL — SIGNIFICANT CHANGE UP (ref 1–3.3)
LYMPHOCYTES # BLD AUTO: 19.1 % — SIGNIFICANT CHANGE UP (ref 13–44)
MAGNESIUM SERPL-MCNC: 1.7 MG/DL — SIGNIFICANT CHANGE UP (ref 1.6–2.6)
MCHC RBC-ENTMCNC: 31.1 PG — SIGNIFICANT CHANGE UP (ref 27–34)
MCHC RBC-ENTMCNC: 31.3 GM/DL — LOW (ref 32–36)
MCV RBC AUTO: 99.2 FL — SIGNIFICANT CHANGE UP (ref 80–100)
MONOCYTES # BLD AUTO: 0.65 K/UL — SIGNIFICANT CHANGE UP (ref 0–0.9)
MONOCYTES NFR BLD AUTO: 10.7 % — SIGNIFICANT CHANGE UP (ref 2–14)
NEUTROPHILS # BLD AUTO: 4.12 K/UL — SIGNIFICANT CHANGE UP (ref 1.8–7.4)
NEUTROPHILS NFR BLD AUTO: 68.1 % — SIGNIFICANT CHANGE UP (ref 43–77)
NONHDLC SERPL-MCNC: 205 MG/DL
NRBC # BLD: 0 /100 WBCS — SIGNIFICANT CHANGE UP (ref 0–0)
PCO2 BLDV: 49 MMHG — SIGNIFICANT CHANGE UP (ref 42–55)
PH BLDV: 7.33 — SIGNIFICANT CHANGE UP (ref 7.32–7.43)
PLATELET # BLD AUTO: 188 K/UL — SIGNIFICANT CHANGE UP (ref 150–400)
PO2 BLDV: 19 MMHG — LOW (ref 25–45)
POTASSIUM BLDV-SCNC: 6.3 MMOL/L — CRITICAL HIGH (ref 3.5–5.1)
POTASSIUM SERPL-MCNC: 5.5 MMOL/L — HIGH (ref 3.5–5.3)
POTASSIUM SERPL-SCNC: 5.5 MMOL/L — HIGH (ref 3.5–5.3)
POTASSIUM SERPL-SCNC: 6.7 MMOL/L
PROT SERPL-MCNC: 7.3 G/DL
PROT SERPL-MCNC: 7.6 G/DL — SIGNIFICANT CHANGE UP (ref 6–8.3)
RBC # BLD: 3.7 M/UL — LOW (ref 4.2–5.8)
RBC # FLD: 12.3 % — SIGNIFICANT CHANGE UP (ref 10.3–14.5)
SAO2 % BLDV: 31.3 % — LOW (ref 67–88)
SODIUM SERPL-SCNC: 137 MMOL/L
SODIUM SERPL-SCNC: 140 MMOL/L — SIGNIFICANT CHANGE UP (ref 135–145)
TRIGL SERPL-MCNC: 99 MG/DL
WBC # BLD: 6.06 K/UL — SIGNIFICANT CHANGE UP (ref 3.8–10.5)
WBC # FLD AUTO: 6.06 K/UL — SIGNIFICANT CHANGE UP (ref 3.8–10.5)

## 2024-06-28 PROCEDURE — 85014 HEMATOCRIT: CPT

## 2024-06-28 PROCEDURE — 83605 ASSAY OF LACTIC ACID: CPT

## 2024-06-28 PROCEDURE — 82435 ASSAY OF BLOOD CHLORIDE: CPT

## 2024-06-28 PROCEDURE — 83735 ASSAY OF MAGNESIUM: CPT

## 2024-06-28 PROCEDURE — 36000 PLACE NEEDLE IN VEIN: CPT

## 2024-06-28 PROCEDURE — 80053 COMPREHEN METABOLIC PANEL: CPT

## 2024-06-28 PROCEDURE — 93005 ELECTROCARDIOGRAM TRACING: CPT

## 2024-06-28 PROCEDURE — 82803 BLOOD GASES ANY COMBINATION: CPT

## 2024-06-28 PROCEDURE — 99285 EMERGENCY DEPT VISIT HI MDM: CPT

## 2024-06-28 PROCEDURE — 85025 COMPLETE CBC W/AUTO DIFF WBC: CPT

## 2024-06-28 PROCEDURE — 84132 ASSAY OF SERUM POTASSIUM: CPT

## 2024-06-28 PROCEDURE — 84295 ASSAY OF SERUM SODIUM: CPT

## 2024-06-28 PROCEDURE — 82330 ASSAY OF CALCIUM: CPT

## 2024-06-28 PROCEDURE — 85018 HEMOGLOBIN: CPT

## 2024-06-28 PROCEDURE — 99284 EMERGENCY DEPT VISIT MOD MDM: CPT | Mod: 25

## 2024-06-28 PROCEDURE — 82947 ASSAY GLUCOSE BLOOD QUANT: CPT

## 2024-06-28 RX ORDER — SODIUM ZIRCONIUM CYCLOSILICATE 10 G/10G
10 POWDER, FOR SUSPENSION ORAL ONCE
Refills: 0 | Status: COMPLETED | OUTPATIENT
Start: 2024-06-28 | End: 2024-06-28

## 2024-06-28 RX ADMIN — SODIUM ZIRCONIUM CYCLOSILICATE 10 GRAM(S): 10 POWDER, FOR SUSPENSION ORAL at 12:58

## 2024-09-23 ENCOUNTER — OFFICE (OUTPATIENT)
Dept: URBAN - METROPOLITAN AREA CLINIC 35 | Facility: CLINIC | Age: 79
Setting detail: OPHTHALMOLOGY
End: 2024-09-23
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
## 2024-09-23 PROCEDURE — NO SHOW FE NO SHOW FEE: Performed by: OPHTHALMOLOGY

## 2024-10-07 ENCOUNTER — APPOINTMENT (OUTPATIENT)
Dept: ENDOCRINOLOGY | Facility: CLINIC | Age: 79
End: 2024-10-07
Payer: MEDICARE

## 2024-10-07 VITALS
HEIGHT: 66 IN | RESPIRATION RATE: 18 BRPM | BODY MASS INDEX: 26.36 KG/M2 | SYSTOLIC BLOOD PRESSURE: 130 MMHG | HEART RATE: 76 BPM | WEIGHT: 164 LBS | OXYGEN SATURATION: 99 % | TEMPERATURE: 97.3 F | DIASTOLIC BLOOD PRESSURE: 80 MMHG

## 2024-10-07 DIAGNOSIS — E83.52 HYPERCALCEMIA: ICD-10-CM

## 2024-10-07 DIAGNOSIS — E11.9 TYPE 2 DIABETES MELLITUS W/OUT COMPLICATIONS: ICD-10-CM

## 2024-10-07 LAB
GLUCOSE BLDC GLUCOMTR-MCNC: 163
HBA1C MFR BLD HPLC: 7.4

## 2024-10-07 PROCEDURE — 83036 HEMOGLOBIN GLYCOSYLATED A1C: CPT | Mod: QW

## 2024-10-07 PROCEDURE — 99214 OFFICE O/P EST MOD 30 MIN: CPT

## 2024-10-07 PROCEDURE — 82962 GLUCOSE BLOOD TEST: CPT

## 2024-10-29 ENCOUNTER — NON-APPOINTMENT (OUTPATIENT)
Age: 79
End: 2024-10-29

## 2024-10-29 ENCOUNTER — APPOINTMENT (OUTPATIENT)
Dept: CARDIOLOGY | Facility: CLINIC | Age: 79
End: 2024-10-29
Payer: MEDICARE

## 2024-10-29 VITALS
WEIGHT: 164 LBS | OXYGEN SATURATION: 99 % | BODY MASS INDEX: 26.36 KG/M2 | DIASTOLIC BLOOD PRESSURE: 80 MMHG | HEIGHT: 66 IN | SYSTOLIC BLOOD PRESSURE: 118 MMHG | HEART RATE: 73 BPM

## 2024-10-29 DIAGNOSIS — E87.5 HYPERKALEMIA: ICD-10-CM

## 2024-10-29 DIAGNOSIS — I45.10 UNSPECIFIED RIGHT BUNDLE-BRANCH BLOCK: ICD-10-CM

## 2024-10-29 DIAGNOSIS — E11.9 TYPE 2 DIABETES MELLITUS W/OUT COMPLICATIONS: ICD-10-CM

## 2024-10-29 DIAGNOSIS — I10 ESSENTIAL (PRIMARY) HYPERTENSION: ICD-10-CM

## 2024-10-29 DIAGNOSIS — E78.5 HYPERLIPIDEMIA, UNSPECIFIED: ICD-10-CM

## 2024-10-29 DIAGNOSIS — R74.8 ABNORMAL LEVELS OF OTHER SERUM ENZYMES: ICD-10-CM

## 2024-10-29 DIAGNOSIS — R79.89 OTHER SPECIFIED ABNORMAL FINDINGS OF BLOOD CHEMISTRY: ICD-10-CM

## 2024-10-29 PROCEDURE — 90662 IIV NO PRSV INCREASED AG IM: CPT

## 2024-10-29 PROCEDURE — 99214 OFFICE O/P EST MOD 30 MIN: CPT

## 2024-10-29 PROCEDURE — G0008: CPT

## 2024-10-29 PROCEDURE — G2211 COMPLEX E/M VISIT ADD ON: CPT

## 2024-10-29 PROCEDURE — 93306 TTE W/DOPPLER COMPLETE: CPT

## 2024-10-31 PROBLEM — R74.8 ELEVATED CPK: Status: ACTIVE | Noted: 2024-10-31

## 2024-10-31 LAB
ALBUMIN SERPL ELPH-MCNC: 4.6 G/DL
ALP BLD-CCNC: 80 U/L
ALT SERPL-CCNC: 24 U/L
ANION GAP SERPL CALC-SCNC: 14 MMOL/L
APO B SERPL-MCNC: 153 MG/DL
AST SERPL-CCNC: 32 U/L
BILIRUB DIRECT SERPL-MCNC: 0.1 MG/DL
BILIRUB INDIRECT SERPL-MCNC: 0.1 MG/DL
BILIRUB SERPL-MCNC: 0.2 MG/DL
BUN SERPL-MCNC: 21 MG/DL
CALCIUM SERPL-MCNC: 10.3 MG/DL
CHLORIDE SERPL-SCNC: 101 MMOL/L
CHOLEST SERPL-MCNC: 290 MG/DL
CK SERPL-CCNC: 718 U/L
CO2 SERPL-SCNC: 24 MMOL/L
CREAT SERPL-MCNC: 1.39 MG/DL
CRP SERPL HS-MCNC: 7.42 MG/L
EGFR: 52 ML/MIN/1.73M2
ESTIMATED AVERAGE GLUCOSE: 166 MG/DL
GLUCOSE SERPL-MCNC: 163 MG/DL
HBA1C MFR BLD HPLC: 7.4 %
HDLC SERPL-MCNC: 81 MG/DL
LDLC SERPL CALC-MCNC: 198 MG/DL
LDLC SERPL DIRECT ASSAY-MCNC: 202 MG/DL
NONHDLC SERPL-MCNC: 209 MG/DL
POTASSIUM SERPL-SCNC: 5.5 MMOL/L
PROT SERPL-MCNC: 7.5 G/DL
SODIUM SERPL-SCNC: 140 MMOL/L
TRIGL SERPL-MCNC: 73 MG/DL

## 2024-10-31 RX ORDER — EVOLOCUMAB 140 MG/ML
140 INJECTION, SOLUTION SUBCUTANEOUS
Qty: 1 | Refills: 3 | Status: ACTIVE | COMMUNITY
Start: 2024-10-31 | End: 1900-01-01

## 2024-11-12 ENCOUNTER — DOCTOR'S OFFICE (OUTPATIENT)
Facility: LOCATION | Age: 79
Setting detail: OPHTHALMOLOGY
End: 2024-11-12
Payer: MEDICARE

## 2024-11-12 DIAGNOSIS — H35.3131: ICD-10-CM

## 2024-11-12 DIAGNOSIS — H35.373: ICD-10-CM

## 2024-11-12 DIAGNOSIS — E11.3553: ICD-10-CM

## 2024-11-12 PROCEDURE — 92201 OPSCPY EXTND RTA DRAW UNI/BI: CPT | Performed by: OPHTHALMOLOGY

## 2024-11-12 ASSESSMENT — KERATOMETRY
OD_K2POWER_DIOPTERS: 44.75
OS_K1POWER_DIOPTERS: 44.00
OS_AXISANGLE_DEGREES: 008
OD_AXISANGLE_DEGREES: 153
OD_K1POWER_DIOPTERS: 44.25
OS_K2POWER_DIOPTERS: 45.00

## 2024-11-12 ASSESSMENT — REFRACTION_AUTOREFRACTION
OS_CYLINDER: -1.50
OS_AXIS: 105
OD_SPHERE: +0.75
OD_AXIS: 091
OD_CYLINDER: -2.00
OS_SPHERE: -0.50

## 2024-11-12 ASSESSMENT — VISUAL ACUITY
OS_BCVA: 20/30
OD_BCVA: 20/30+2

## 2024-12-03 ENCOUNTER — OUTPATIENT (OUTPATIENT)
Dept: OUTPATIENT SERVICES | Facility: HOSPITAL | Age: 79
LOS: 1 days | End: 2024-12-03
Payer: MEDICARE

## 2024-12-03 ENCOUNTER — APPOINTMENT (OUTPATIENT)
Dept: CT IMAGING | Facility: CLINIC | Age: 79
End: 2024-12-03
Payer: MEDICARE

## 2024-12-03 DIAGNOSIS — Z00.00 ENCOUNTER FOR GENERAL ADULT MEDICAL EXAMINATION WITHOUT ABNORMAL FINDINGS: ICD-10-CM

## 2024-12-03 DIAGNOSIS — Z98.890 OTHER SPECIFIED POSTPROCEDURAL STATES: Chronic | ICD-10-CM

## 2024-12-03 DIAGNOSIS — I26.99 OTHER PULMONARY EMBOLISM WITHOUT ACUTE COR PULMONALE: ICD-10-CM

## 2024-12-03 PROCEDURE — 74177 CT ABD & PELVIS W/CONTRAST: CPT | Mod: 26

## 2024-12-03 PROCEDURE — 74177 CT ABD & PELVIS W/CONTRAST: CPT

## 2024-12-06 ENCOUNTER — NON-APPOINTMENT (OUTPATIENT)
Age: 79
End: 2024-12-06

## 2024-12-06 ENCOUNTER — APPOINTMENT (OUTPATIENT)
Dept: NEPHROLOGY | Facility: CLINIC | Age: 79
End: 2024-12-06
Payer: MEDICARE

## 2024-12-06 VITALS
SYSTOLIC BLOOD PRESSURE: 128 MMHG | HEART RATE: 86 BPM | DIASTOLIC BLOOD PRESSURE: 76 MMHG | RESPIRATION RATE: 16 BRPM | WEIGHT: 164 LBS | HEIGHT: 66 IN | BODY MASS INDEX: 26.36 KG/M2 | OXYGEN SATURATION: 98 %

## 2024-12-06 DIAGNOSIS — E87.5 HYPERKALEMIA: ICD-10-CM

## 2024-12-06 DIAGNOSIS — I26.99 OTHER PULMONARY EMBOLISM W/OUT ACUTE COR PULMONALE: ICD-10-CM

## 2024-12-06 DIAGNOSIS — N28.1 CYST OF KIDNEY, ACQUIRED: ICD-10-CM

## 2024-12-06 PROBLEM — N18.31 STAGE 3A CHRONIC KIDNEY DISEASE: Status: ACTIVE | Noted: 2024-12-06

## 2024-12-06 PROCEDURE — 99203 OFFICE O/P NEW LOW 30 MIN: CPT

## 2024-12-09 LAB
ALBUMIN SERPL ELPH-MCNC: 4.5 G/DL
ANION GAP SERPL CALC-SCNC: 20 MMOL/L
APPEARANCE: CLEAR
BILIRUBIN URINE: NEGATIVE
BLOOD URINE: NEGATIVE
BUN SERPL-MCNC: 24 MG/DL
CALCIUM SERPL-MCNC: 10 MG/DL
CHLORIDE SERPL-SCNC: 103 MMOL/L
CO2 SERPL-SCNC: 17 MMOL/L
COLOR: YELLOW
CREAT SERPL-MCNC: 1.26 MG/DL
CREAT SPEC-SCNC: 97 MG/DL
CREAT/PROT UR: 0.1 RATIO
DEPRECATED KAPPA LC FREE/LAMBDA SER: 1.33 RATIO
EGFR: 58 ML/MIN/1.73M2
GLUCOSE QUALITATIVE U: 100 MG/DL
GLUCOSE SERPL-MCNC: 263 MG/DL
KAPPA LC CSF-MCNC: 2.96 MG/DL
KAPPA LC SERPL-MCNC: 3.93 MG/DL
KETONES URINE: NEGATIVE MG/DL
LEUKOCYTE ESTERASE URINE: NEGATIVE
NITRITE URINE: NEGATIVE
PH URINE: 5.5
PHOSPHATE SERPL-MCNC: 2.5 MG/DL
POTASSIUM SERPL-SCNC: 4.9 MMOL/L
PROT UR-MCNC: 11 MG/DL
PROTEIN URINE: NORMAL MG/DL
SODIUM SERPL-SCNC: 140 MMOL/L
SPECIFIC GRAVITY URINE: 1.02
UROBILINOGEN URINE: 0.2 MG/DL

## 2024-12-10 ENCOUNTER — APPOINTMENT (OUTPATIENT)
Dept: CARDIOLOGY | Facility: CLINIC | Age: 79
End: 2024-12-10
Payer: MEDICARE

## 2024-12-10 VITALS
WEIGHT: 164 LBS | OXYGEN SATURATION: 98 % | HEIGHT: 66 IN | DIASTOLIC BLOOD PRESSURE: 80 MMHG | HEART RATE: 78 BPM | BODY MASS INDEX: 26.36 KG/M2 | SYSTOLIC BLOOD PRESSURE: 119 MMHG

## 2024-12-10 DIAGNOSIS — N52.9 MALE ERECTILE DYSFUNCTION, UNSPECIFIED: ICD-10-CM

## 2024-12-10 DIAGNOSIS — E78.5 HYPERLIPIDEMIA, UNSPECIFIED: ICD-10-CM

## 2024-12-10 DIAGNOSIS — I10 ESSENTIAL (PRIMARY) HYPERTENSION: ICD-10-CM

## 2024-12-10 DIAGNOSIS — I45.10 UNSPECIFIED RIGHT BUNDLE-BRANCH BLOCK: ICD-10-CM

## 2024-12-10 DIAGNOSIS — N18.31 CHRONIC KIDNEY DISEASE, STAGE 3A: ICD-10-CM

## 2024-12-10 DIAGNOSIS — R74.8 ABNORMAL LEVELS OF OTHER SERUM ENZYMES: ICD-10-CM

## 2024-12-10 DIAGNOSIS — E11.9 TYPE 2 DIABETES MELLITUS W/OUT COMPLICATIONS: ICD-10-CM

## 2024-12-10 LAB
ALBUMIN MFR SERPL ELPH: 58.8 %
ALBUMIN SERPL-MCNC: 4.4 G/DL
ALBUMIN/GLOB SERPL: 1.4 RATIO
ALPHA1 GLOB MFR SERPL ELPH: 4.2 %
ALPHA1 GLOB SERPL ELPH-MCNC: 0.3 G/DL
ALPHA2 GLOB MFR SERPL ELPH: 14.3 %
ALPHA2 GLOB SERPL ELPH-MCNC: 1.1 G/DL
B-GLOBULIN MFR SERPL ELPH: 9 %
B-GLOBULIN SERPL ELPH-MCNC: 0.7 G/DL
GAMMA GLOB FLD ELPH-MCNC: 1 G/DL
GAMMA GLOB MFR SERPL ELPH: 13.7 %
INTERPRETATION SERPL IEP-IMP: NORMAL
PROT SERPL-MCNC: 7.5 G/DL
PROT SERPL-MCNC: 7.5 G/DL

## 2024-12-10 PROCEDURE — 99214 OFFICE O/P EST MOD 30 MIN: CPT

## 2024-12-10 PROCEDURE — 93000 ELECTROCARDIOGRAM COMPLETE: CPT

## 2024-12-10 PROCEDURE — G2211 COMPLEX E/M VISIT ADD ON: CPT

## 2024-12-10 RX ORDER — SILDENAFIL 50 MG/1
50 TABLET ORAL
Qty: 7 | Refills: 3 | Status: ACTIVE | COMMUNITY
Start: 2024-12-10 | End: 1900-01-01

## 2024-12-11 LAB
ALBUMIN SERPL ELPH-MCNC: 4.3 G/DL
ALP BLD-CCNC: 68 U/L
ALT SERPL-CCNC: 14 U/L
ANION GAP SERPL CALC-SCNC: 16 MMOL/L
AST SERPL-CCNC: 18 U/L
BASOPHILS # BLD AUTO: 0.02 K/UL
BASOPHILS NFR BLD AUTO: 0.4 %
BILIRUB DIRECT SERPL-MCNC: 0.1 MG/DL
BILIRUB INDIRECT SERPL-MCNC: 0.1 MG/DL
BILIRUB SERPL-MCNC: 0.2 MG/DL
BUN SERPL-MCNC: 27 MG/DL
CALCIUM SERPL-MCNC: 10.2 MG/DL
CHLORIDE SERPL-SCNC: 104 MMOL/L
CHOLEST SERPL-MCNC: 231 MG/DL
CK SERPL-CCNC: 306 U/L
CO2 SERPL-SCNC: 21 MMOL/L
CREAT SERPL-MCNC: 1.41 MG/DL
CRP SERPL HS-MCNC: 1.01 MG/L
EGFR: 51 ML/MIN/1.73M2
EOSINOPHIL # BLD AUTO: 0.08 K/UL
EOSINOPHIL NFR BLD AUTO: 1.7 %
ESTIMATED AVERAGE GLUCOSE: 174 MG/DL
GLUCOSE SERPL-MCNC: 147 MG/DL
HBA1C MFR BLD HPLC: 7.7 %
HCT VFR BLD CALC: 37.2 %
HDLC SERPL-MCNC: 65 MG/DL
HGB BLD-MCNC: 11.4 G/DL
IMM GRANULOCYTES NFR BLD AUTO: 0.2 %
LDLC SERPL CALC-MCNC: 146 MG/DL
LDLC SERPL DIRECT ASSAY-MCNC: 138 MG/DL
LYMPHOCYTES # BLD AUTO: 1.52 K/UL
LYMPHOCYTES NFR BLD AUTO: 32.3 %
MAN DIFF?: NORMAL
MCHC RBC-ENTMCNC: 29 PG
MCHC RBC-ENTMCNC: 30.6 G/DL
MCV RBC AUTO: 94.7 FL
MONOCYTES # BLD AUTO: 0.5 K/UL
MONOCYTES NFR BLD AUTO: 10.6 %
NEUTROPHILS # BLD AUTO: 2.58 K/UL
NEUTROPHILS NFR BLD AUTO: 54.8 %
NONHDLC SERPL-MCNC: 166 MG/DL
PLATELET # BLD AUTO: 279 K/UL
POTASSIUM SERPL-SCNC: 5.6 MMOL/L
PROT SERPL-MCNC: 7.7 G/DL
RBC # BLD: 3.93 M/UL
RBC # FLD: 12.7 %
SODIUM SERPL-SCNC: 142 MMOL/L
TRIGL SERPL-MCNC: 109 MG/DL
WBC # FLD AUTO: 4.71 K/UL

## 2024-12-16 LAB
ANION GAP SERPL CALC-SCNC: 14 MMOL/L
BUN SERPL-MCNC: 23 MG/DL
CALCIUM SERPL-MCNC: 10.1 MG/DL
CHLORIDE SERPL-SCNC: 103 MMOL/L
CO2 SERPL-SCNC: 23 MMOL/L
CREAT SERPL-MCNC: 1.2 MG/DL
EGFR: 62 ML/MIN/1.73M2
GLUCOSE SERPL-MCNC: 172 MG/DL
POTASSIUM SERPL-SCNC: 5.2 MMOL/L
SODIUM SERPL-SCNC: 140 MMOL/L

## 2025-02-10 ENCOUNTER — APPOINTMENT (OUTPATIENT)
Dept: ENDOCRINOLOGY | Facility: CLINIC | Age: 80
End: 2025-02-10
Payer: MEDICARE

## 2025-02-10 VITALS
SYSTOLIC BLOOD PRESSURE: 148 MMHG | RESPIRATION RATE: 18 BRPM | HEART RATE: 98 BPM | WEIGHT: 165 LBS | DIASTOLIC BLOOD PRESSURE: 84 MMHG | BODY MASS INDEX: 26.52 KG/M2 | OXYGEN SATURATION: 99 % | HEIGHT: 66 IN | TEMPERATURE: 97.5 F

## 2025-02-10 VITALS — SYSTOLIC BLOOD PRESSURE: 152 MMHG | DIASTOLIC BLOOD PRESSURE: 81 MMHG

## 2025-02-10 DIAGNOSIS — E11.9 TYPE 2 DIABETES MELLITUS W/OUT COMPLICATIONS: ICD-10-CM

## 2025-02-10 DIAGNOSIS — E83.52 HYPERCALCEMIA: ICD-10-CM

## 2025-02-10 LAB
GLUCOSE BLDC GLUCOMTR-MCNC: 75
HBA1C MFR BLD HPLC: 7.5

## 2025-02-10 PROCEDURE — 99214 OFFICE O/P EST MOD 30 MIN: CPT

## 2025-02-10 PROCEDURE — 83036 HEMOGLOBIN GLYCOSYLATED A1C: CPT | Mod: QW

## 2025-02-10 PROCEDURE — 82962 GLUCOSE BLOOD TEST: CPT

## 2025-03-18 ENCOUNTER — APPOINTMENT (OUTPATIENT)
Dept: CARDIOLOGY | Facility: CLINIC | Age: 80
End: 2025-03-18
Payer: MEDICARE

## 2025-03-18 ENCOUNTER — NON-APPOINTMENT (OUTPATIENT)
Age: 80
End: 2025-03-18

## 2025-03-18 ENCOUNTER — MED ADMIN CHARGE (OUTPATIENT)
Age: 80
End: 2025-03-18

## 2025-03-18 VITALS
DIASTOLIC BLOOD PRESSURE: 68 MMHG | HEART RATE: 83 BPM | OXYGEN SATURATION: 99 % | WEIGHT: 165 LBS | SYSTOLIC BLOOD PRESSURE: 126 MMHG | BODY MASS INDEX: 26.52 KG/M2 | HEIGHT: 66 IN | TEMPERATURE: 97.9 F

## 2025-03-18 DIAGNOSIS — I25.10 ATHEROSCLEROTIC HEART DISEASE OF NATIVE CORONARY ARTERY W/OUT ANGINA PECTORIS: ICD-10-CM

## 2025-03-18 DIAGNOSIS — E11.9 TYPE 2 DIABETES MELLITUS W/OUT COMPLICATIONS: ICD-10-CM

## 2025-03-18 DIAGNOSIS — I26.99 OTHER PULMONARY EMBOLISM W/OUT ACUTE COR PULMONALE: ICD-10-CM

## 2025-03-18 DIAGNOSIS — R74.8 ABNORMAL LEVELS OF OTHER SERUM ENZYMES: ICD-10-CM

## 2025-03-18 DIAGNOSIS — I45.10 UNSPECIFIED RIGHT BUNDLE-BRANCH BLOCK: ICD-10-CM

## 2025-03-18 DIAGNOSIS — Z85.46 PERSONAL HISTORY OF MALIGNANT NEOPLASM OF PROSTATE: ICD-10-CM

## 2025-03-18 DIAGNOSIS — E78.5 HYPERLIPIDEMIA, UNSPECIFIED: ICD-10-CM

## 2025-03-18 DIAGNOSIS — N18.31 CHRONIC KIDNEY DISEASE, STAGE 3A: ICD-10-CM

## 2025-03-18 DIAGNOSIS — I10 ESSENTIAL (PRIMARY) HYPERTENSION: ICD-10-CM

## 2025-03-18 PROCEDURE — 99214 OFFICE O/P EST MOD 30 MIN: CPT

## 2025-03-18 PROCEDURE — 93000 ELECTROCARDIOGRAM COMPLETE: CPT

## 2025-03-18 PROCEDURE — G2211 COMPLEX E/M VISIT ADD ON: CPT

## 2025-03-19 PROBLEM — R74.8 ELEVATED CPK: Status: RESOLVED | Noted: 2024-10-31 | Resolved: 2025-03-19

## 2025-03-19 PROBLEM — R74.8 ELEVATED CPK: Status: ACTIVE | Noted: 2025-03-19

## 2025-03-19 LAB
ALBUMIN SERPL ELPH-MCNC: 4.6 G/DL
ALP BLD-CCNC: 87 U/L
ALT SERPL-CCNC: 21 U/L
ANION GAP SERPL CALC-SCNC: 14 MMOL/L
APPEARANCE: CLEAR
AST SERPL-CCNC: 35 U/L
BACTERIA: NEGATIVE /HPF
BASOPHILS # BLD AUTO: 0.03 K/UL
BASOPHILS NFR BLD AUTO: 0.5 %
BILIRUB DIRECT SERPL-MCNC: 0.1 MG/DL
BILIRUB INDIRECT SERPL-MCNC: 0.1 MG/DL
BILIRUB SERPL-MCNC: <0.2 MG/DL
BILIRUBIN URINE: NEGATIVE
BLOOD URINE: NEGATIVE
BUN SERPL-MCNC: 21 MG/DL
CALCIUM SERPL-MCNC: 9.8 MG/DL
CAST: 1 /LPF
CHLORIDE SERPL-SCNC: 106 MMOL/L
CHOLEST SERPL-MCNC: 218 MG/DL
CK SERPL-CCNC: 1151 U/L
CO2 SERPL-SCNC: 22 MMOL/L
COLOR: YELLOW
CREAT SERPL-MCNC: 1.21 MG/DL
CREAT SPEC-SCNC: 99 MG/DL
CRP SERPL HS-MCNC: 1.58 MG/L
EGFRCR SERPLBLD CKD-EPI 2021: 61 ML/MIN/1.73M2
EOSINOPHIL # BLD AUTO: 0.08 K/UL
EOSINOPHIL NFR BLD AUTO: 1.4 %
EPITHELIAL CELLS: 0 /HPF
ESTIMATED AVERAGE GLUCOSE: 183 MG/DL
GLUCOSE QUALITATIVE U: NEGATIVE MG/DL
GLUCOSE SERPL-MCNC: 164 MG/DL
HBA1C MFR BLD HPLC: 8 %
HCT VFR BLD CALC: 36.7 %
HDLC SERPL-MCNC: 72 MG/DL
HGB BLD-MCNC: 11.8 G/DL
IMM GRANULOCYTES NFR BLD AUTO: 0.3 %
KETONES URINE: NEGATIVE MG/DL
LDLC SERPL DIRECT ASSAY-MCNC: 134 MG/DL
LDLC SERPL-MCNC: 127 MG/DL
LEUKOCYTE ESTERASE URINE: NEGATIVE
LYMPHOCYTES # BLD AUTO: 1.49 K/UL
LYMPHOCYTES NFR BLD AUTO: 25.9 %
MAN DIFF?: NORMAL
MCHC RBC-ENTMCNC: 29.8 PG
MCHC RBC-ENTMCNC: 32.2 G/DL
MCV RBC AUTO: 92.7 FL
MICROALBUMIN 24H UR DL<=1MG/L-MCNC: 3.3 MG/DL
MICROALBUMIN/CREAT 24H UR-RTO: 34 MG/G
MICROSCOPIC-UA: NORMAL
MONOCYTES # BLD AUTO: 0.66 K/UL
MONOCYTES NFR BLD AUTO: 11.5 %
NEUTROPHILS # BLD AUTO: 3.48 K/UL
NEUTROPHILS NFR BLD AUTO: 60.4 %
NITRITE URINE: NEGATIVE
NONHDLC SERPL-MCNC: 145 MG/DL
PH URINE: 5.5
PLATELET # BLD AUTO: 216 K/UL
POTASSIUM SERPL-SCNC: 4.7 MMOL/L
PROT SERPL-MCNC: 7.1 G/DL
PROTEIN URINE: NEGATIVE MG/DL
RBC # BLD: 3.96 M/UL
RBC # FLD: 13 %
RED BLOOD CELLS URINE: 0 /HPF
SODIUM SERPL-SCNC: 142 MMOL/L
SPECIFIC GRAVITY URINE: 1.02
TRIGL SERPL-MCNC: 105 MG/DL
UROBILINOGEN URINE: 0.2 MG/DL
WBC # FLD AUTO: 5.76 K/UL
WHITE BLOOD CELLS URINE: 0 /HPF

## 2025-03-21 ENCOUNTER — NON-APPOINTMENT (OUTPATIENT)
Age: 80
End: 2025-03-21

## 2025-03-21 LAB — CK SERPL-CCNC: 765 U/L

## 2025-03-31 NOTE — ASU PATIENT PROFILE, ADULT - NS PRO ABUSE SCREEN AFRAID ANYONE YN
Patient seen and examined at bedside. BRISEIDA, has some pain in the groin but it is controlled. Otherwise no acute complaints.     MEDICATIONS  (STANDING):  allopurinol 100 milliGRAM(s) Oral daily  aspirin  chewable 81 milliGRAM(s) Oral daily  atorvastatin 80 milliGRAM(s) Oral at bedtime  chlorhexidine 4% Liquid 1 Application(s) Topical <User Schedule>  clopidogrel Tablet 75 milliGRAM(s) Oral daily  ezetimibe 10 milliGRAM(s) Oral at bedtime  insulin lispro (ADMELOG) corrective regimen sliding scale   SubCutaneous Before meals and at bedtime  iron sucrose IVPB 200 milliGRAM(s) IV Intermittent every 24 hours  isosorbide   mononitrate ER Tablet (IMDUR) 30 milliGRAM(s) Oral daily  levothyroxine 88 MICROGram(s) Oral daily  lidocaine   4% Patch 1 Patch Transdermal every 24 hours  melatonin 5 milliGRAM(s) Oral at bedtime  pantoprazole    Tablet 40 milliGRAM(s) Oral before breakfast  sodium chloride 0.9% lock flush 3 milliLiter(s) IV Push every 8 hours  triamterene 37.5 mG/hydrochlorothiazide 25 mG Tablet 1 Tablet(s) Oral daily    MEDICATIONS  (PRN):  acetaminophen     Tablet .. 975 milliGRAM(s) Oral every 6 hours PRN Temp greater or equal to 38C (100.4F), Moderate Pain (4 - 6)  benzocaine/menthol Lozenge 1 Lozenge Oral four times a day PRN Sore Throat  oxyCODONE    IR 5 milliGRAM(s) Oral every 6 hours PRN Moderate Pain (4 - 6)  oxyCODONE    IR 10 milliGRAM(s) Oral every 6 hours PRN Severe Pain (7 - 10)                          9.0    9.01  )-----------( 75       ( 31 Mar 2025 04:43 )             26.9   03-31    136  |  102  |  52.7[H]  ----------------------------<  111[H]  3.9   |  21.0[L]  |  2.42[H]    Ca    8.1[L]      31 Mar 2025 03:20  Phos  4.2     03-30  Mg     2.7     03-31    TPro  4.1[L]  /  Alb  2.7[L]  /  TBili  0.4  /  DBili  x   /  AST  21  /  ALT  12  /  AlkPhos  60  03-29    Vital Signs Last 24 Hrs  T(C): 37.2 (31 Mar 2025 03:00), Max: 38.1 (30 Mar 2025 22:00)  T(F): 99 (31 Mar 2025 03:00), Max: 100.6 (30 Mar 2025 22:00)  HR: 69 (31 Mar 2025 03:00) (69 - 85)  BP: 120/40 (31 Mar 2025 03:00) (90/18 - 147/45)  BP(mean): 65 (31 Mar 2025 03:00) (32 - 80)  RR: 14 (31 Mar 2025 03:00) (12 - 19)  SpO2: 97% (31 Mar 2025 03:00) (90% - 98%)    Parameters below as of 31 Mar 2025 03:00  Patient On (Oxygen Delivery Method): nasal cannula  O2 Flow (L/min): 1        Physical exam:  Gen: resting in bed comfortably in NAD  Chest: no increased WOB, regular inspiratory effort   Vascular: L groin with expected levels of ecchymosis, prevena in place holding good suction.  Soft and appropriately tender.  Palpable distal pulses  NEURO: awake, alert no

## 2025-04-01 ENCOUNTER — APPOINTMENT (OUTPATIENT)
Dept: NEPHROLOGY | Facility: CLINIC | Age: 80
End: 2025-04-01
Payer: MEDICARE

## 2025-04-01 VITALS
DIASTOLIC BLOOD PRESSURE: 72 MMHG | HEART RATE: 82 BPM | BODY MASS INDEX: 26.52 KG/M2 | SYSTOLIC BLOOD PRESSURE: 130 MMHG | OXYGEN SATURATION: 99 % | HEIGHT: 66 IN | RESPIRATION RATE: 16 BRPM | WEIGHT: 165 LBS

## 2025-04-01 DIAGNOSIS — E11.9 TYPE 2 DIABETES MELLITUS W/OUT COMPLICATIONS: ICD-10-CM

## 2025-04-01 DIAGNOSIS — N18.31 CHRONIC KIDNEY DISEASE, STAGE 3A: ICD-10-CM

## 2025-04-01 PROCEDURE — 99213 OFFICE O/P EST LOW 20 MIN: CPT

## 2025-04-07 ENCOUNTER — APPOINTMENT (OUTPATIENT)
Dept: GASTROENTEROLOGY | Facility: CLINIC | Age: 80
End: 2025-04-07

## 2025-04-18 ENCOUNTER — APPOINTMENT (OUTPATIENT)
Dept: CT IMAGING | Facility: CLINIC | Age: 80
End: 2025-04-18

## 2025-04-18 ENCOUNTER — OUTPATIENT (OUTPATIENT)
Dept: OUTPATIENT SERVICES | Facility: HOSPITAL | Age: 80
LOS: 1 days | End: 2025-04-18
Payer: MEDICARE

## 2025-04-18 DIAGNOSIS — Z00.8 ENCOUNTER FOR OTHER GENERAL EXAMINATION: ICD-10-CM

## 2025-04-18 DIAGNOSIS — Z98.890 OTHER SPECIFIED POSTPROCEDURAL STATES: Chronic | ICD-10-CM

## 2025-04-18 PROCEDURE — 74177 CT ABD & PELVIS W/CONTRAST: CPT

## 2025-04-18 PROCEDURE — 71260 CT THORAX DX C+: CPT | Mod: 26

## 2025-04-18 PROCEDURE — 74177 CT ABD & PELVIS W/CONTRAST: CPT | Mod: 26

## 2025-04-18 PROCEDURE — 71260 CT THORAX DX C+: CPT

## 2025-04-19 ENCOUNTER — RX RENEWAL (OUTPATIENT)
Age: 80
End: 2025-04-19

## 2025-04-28 ENCOUNTER — APPOINTMENT (OUTPATIENT)
Dept: GASTROENTEROLOGY | Facility: CLINIC | Age: 80
End: 2025-04-28

## 2025-04-28 DIAGNOSIS — C20 MALIGNANT NEOPLASM OF RECTUM: ICD-10-CM

## 2025-04-28 PROCEDURE — ZZZZZ: CPT

## 2025-04-28 RX ORDER — SODIUM SULFATE, POTASSIUM SULFATE AND MAGNESIUM SULFATE 1.6; 3.13; 17.5 G/177ML; G/177ML; G/177ML
17.5-3.13-1.6 SOLUTION ORAL
Qty: 1 | Refills: 0 | Status: ACTIVE | COMMUNITY
Start: 2025-04-28 | End: 1900-01-01

## 2025-05-12 ENCOUNTER — OFFICE (OUTPATIENT)
Dept: URBAN - METROPOLITAN AREA CLINIC 35 | Facility: CLINIC | Age: 80
Setting detail: OPHTHALMOLOGY
End: 2025-05-12
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
## 2025-05-12 PROCEDURE — NO SHOW FE NO SHOW FEE: Performed by: OPHTHALMOLOGY

## 2025-06-12 ENCOUNTER — DOCTOR'S OFFICE (OUTPATIENT)
Facility: LOCATION | Age: 80
Setting detail: OPHTHALMOLOGY
End: 2025-06-12
Payer: COMMERCIAL

## 2025-06-12 DIAGNOSIS — H35.3131: ICD-10-CM

## 2025-06-12 DIAGNOSIS — H35.373: ICD-10-CM

## 2025-06-12 DIAGNOSIS — E11.3512: ICD-10-CM

## 2025-06-12 DIAGNOSIS — E11.3311: ICD-10-CM

## 2025-06-12 PROCEDURE — 92235 FLUORESCEIN ANGRPH MLTIFRAME: CPT | Performed by: OPHTHALMOLOGY

## 2025-06-12 PROCEDURE — 92134 CPTRZ OPH DX IMG PST SGM RTA: CPT | Performed by: OPHTHALMOLOGY

## 2025-06-12 PROCEDURE — 67210 TREATMENT OF RETINAL LESION: CPT | Mod: RT | Performed by: OPHTHALMOLOGY

## 2025-06-12 PROCEDURE — 99214 OFFICE O/P EST MOD 30 MIN: CPT | Mod: 57 | Performed by: OPHTHALMOLOGY

## 2025-06-13 ASSESSMENT — REFRACTION_AUTOREFRACTION
OS_SPHERE: -0.50
OD_SPHERE: +0.75
OD_AXIS: 091
OD_CYLINDER: -2.00
OS_AXIS: 105
OS_CYLINDER: -1.50

## 2025-06-13 ASSESSMENT — KERATOMETRY
OD_AXISANGLE_DEGREES: 153
OD_K1POWER_DIOPTERS: 44.25
OS_AXISANGLE_DEGREES: 008
OS_K1POWER_DIOPTERS: 44.00
OS_K2POWER_DIOPTERS: 45.00
OD_K2POWER_DIOPTERS: 44.75

## 2025-06-13 ASSESSMENT — VISUAL ACUITY
OD_BCVA: 20/25-2
OS_BCVA: 20/25-1+2

## 2025-06-18 ENCOUNTER — NON-APPOINTMENT (OUTPATIENT)
Age: 80
End: 2025-06-18

## 2025-06-18 ENCOUNTER — APPOINTMENT (OUTPATIENT)
Dept: CARDIOLOGY | Facility: CLINIC | Age: 80
End: 2025-06-18
Payer: MEDICARE

## 2025-06-18 VITALS
TEMPERATURE: 97.9 F | HEART RATE: 83 BPM | DIASTOLIC BLOOD PRESSURE: 62 MMHG | SYSTOLIC BLOOD PRESSURE: 138 MMHG | HEIGHT: 66 IN | OXYGEN SATURATION: 98 % | WEIGHT: 159 LBS | BODY MASS INDEX: 25.55 KG/M2

## 2025-06-18 PROCEDURE — 93000 ELECTROCARDIOGRAM COMPLETE: CPT

## 2025-06-18 PROCEDURE — 99214 OFFICE O/P EST MOD 30 MIN: CPT

## 2025-06-18 PROCEDURE — G2211 COMPLEX E/M VISIT ADD ON: CPT

## 2025-06-19 LAB
ALBUMIN SERPL ELPH-MCNC: 4.5 G/DL
ALP BLD-CCNC: 80 U/L
ALT SERPL-CCNC: 21 U/L
ANION GAP SERPL CALC-SCNC: 14 MMOL/L
APO B SERPL-MCNC: 106 MG/DL
AST SERPL-CCNC: 30 U/L
BASOPHILS # BLD AUTO: 0.04 K/UL
BASOPHILS NFR BLD AUTO: 0.8 %
BILIRUB DIRECT SERPL-MCNC: <0.08 MG/DL
BILIRUB INDIRECT SERPL-MCNC: NORMAL MG/DL
BILIRUB SERPL-MCNC: <0.2 MG/DL
BUN SERPL-MCNC: 25 MG/DL
CALCIUM SERPL-MCNC: 10.4 MG/DL
CHLORIDE SERPL-SCNC: 104 MMOL/L
CHOLEST SERPL-MCNC: 226 MG/DL
CK SERPL-CCNC: 470 U/L
CO2 SERPL-SCNC: 21 MMOL/L
CREAT SERPL-MCNC: 1.44 MG/DL
CRP SERPL HS-MCNC: 0.82 MG/L
EGFRCR SERPLBLD CKD-EPI 2021: 49 ML/MIN/1.73M2
EOSINOPHIL # BLD AUTO: 0.06 K/UL
EOSINOPHIL NFR BLD AUTO: 1.2 %
ESTIMATED AVERAGE GLUCOSE: 177 MG/DL
GLUCOSE SERPL-MCNC: 142 MG/DL
HBA1C MFR BLD HPLC: 7.8 %
HCT VFR BLD CALC: 37.2 %
HDLC SERPL-MCNC: 64 MG/DL
HGB BLD-MCNC: 11.5 G/DL
IMM GRANULOCYTES NFR BLD AUTO: 0.2 %
LDLC SERPL DIRECT ASSAY-MCNC: 130 MG/DL
LDLC SERPL-MCNC: 139 MG/DL
LYMPHOCYTES # BLD AUTO: 1.4 K/UL
LYMPHOCYTES NFR BLD AUTO: 27.8 %
MAN DIFF?: NORMAL
MCHC RBC-ENTMCNC: 29.9 PG
MCHC RBC-ENTMCNC: 30.9 G/DL
MCV RBC AUTO: 96.9 FL
MONOCYTES # BLD AUTO: 0.44 K/UL
MONOCYTES NFR BLD AUTO: 8.7 %
NEUTROPHILS # BLD AUTO: 3.09 K/UL
NEUTROPHILS NFR BLD AUTO: 61.3 %
NONHDLC SERPL-MCNC: 163 MG/DL
PLATELET # BLD AUTO: 231 K/UL
POTASSIUM SERPL-SCNC: 5.7 MMOL/L
PROT SERPL-MCNC: 7 G/DL
RBC # BLD: 3.84 M/UL
RBC # FLD: 13.2 %
SODIUM SERPL-SCNC: 139 MMOL/L
TRIGL SERPL-MCNC: 136 MG/DL
WBC # FLD AUTO: 5.04 K/UL

## 2025-06-26 ENCOUNTER — APPOINTMENT (OUTPATIENT)
Dept: GASTROENTEROLOGY | Facility: HOSPITAL | Age: 80
End: 2025-06-26

## 2025-07-08 ENCOUNTER — DOCTOR'S OFFICE (OUTPATIENT)
Facility: LOCATION | Age: 80
Setting detail: OPHTHALMOLOGY
End: 2025-07-08
Payer: COMMERCIAL

## 2025-07-08 DIAGNOSIS — H35.373: ICD-10-CM

## 2025-07-08 DIAGNOSIS — H35.3131: ICD-10-CM

## 2025-07-08 DIAGNOSIS — E11.3512: ICD-10-CM

## 2025-07-08 DIAGNOSIS — E11.3311: ICD-10-CM

## 2025-07-08 PROCEDURE — 92134 CPTRZ OPH DX IMG PST SGM RTA: CPT | Performed by: OPHTHALMOLOGY

## 2025-07-08 PROCEDURE — 67210 TREATMENT OF RETINAL LESION: CPT | Mod: 79,LT | Performed by: OPHTHALMOLOGY

## 2025-07-08 ASSESSMENT — REFRACTION_AUTOREFRACTION
OS_CYLINDER: -1.50
OS_AXIS: 105
OD_CYLINDER: -2.00
OD_AXIS: 091
OS_SPHERE: -0.50
OD_SPHERE: +0.75

## 2025-07-08 ASSESSMENT — KERATOMETRY
OS_K2POWER_DIOPTERS: 45.00
OS_AXISANGLE_DEGREES: 008
OD_AXISANGLE_DEGREES: 153
OD_K2POWER_DIOPTERS: 44.75
OD_K1POWER_DIOPTERS: 44.25
OS_K1POWER_DIOPTERS: 44.00

## 2025-07-08 ASSESSMENT — VISUAL ACUITY
OD_BCVA: 20/40+2
OS_BCVA: 20/25+2

## 2025-07-09 ENCOUNTER — APPOINTMENT (OUTPATIENT)
Dept: INTERNAL MEDICINE | Facility: CLINIC | Age: 80
End: 2025-07-09

## 2025-07-09 VITALS
TEMPERATURE: 98.1 F | OXYGEN SATURATION: 99 % | HEART RATE: 89 BPM | BODY MASS INDEX: 25.07 KG/M2 | DIASTOLIC BLOOD PRESSURE: 70 MMHG | HEIGHT: 66 IN | SYSTOLIC BLOOD PRESSURE: 140 MMHG | WEIGHT: 156 LBS

## 2025-07-09 PROCEDURE — 99214 OFFICE O/P EST MOD 30 MIN: CPT

## 2025-07-09 PROCEDURE — G2211 COMPLEX E/M VISIT ADD ON: CPT

## 2025-07-09 RX ORDER — METHOCARBAMOL 500 MG/1
500 TABLET, FILM COATED ORAL
Qty: 5 | Refills: 0 | Status: ACTIVE | COMMUNITY
Start: 2025-07-09 | End: 1900-01-01

## 2025-07-09 RX ORDER — METHYLPREDNISOLONE 4 MG/1
4 TABLET ORAL
Qty: 1 | Refills: 0 | Status: ACTIVE | COMMUNITY
Start: 2025-07-09 | End: 1900-01-01

## 2025-07-13 PROBLEM — Z91.81 STATUS POST FALL: Status: ACTIVE | Noted: 2025-07-09

## 2025-07-13 PROBLEM — M50.90 CERVICAL DISC DISEASE: Status: ACTIVE | Noted: 2025-07-09

## 2025-07-23 ENCOUNTER — APPOINTMENT (OUTPATIENT)
Dept: ORTHOPEDIC SURGERY | Facility: CLINIC | Age: 80
End: 2025-07-23
Payer: MEDICARE

## 2025-07-23 DIAGNOSIS — M54.2 CERVICALGIA: ICD-10-CM

## 2025-07-23 PROCEDURE — 99204 OFFICE O/P NEW MOD 45 MIN: CPT

## 2025-07-23 PROCEDURE — 72050 X-RAY EXAM NECK SPINE 4/5VWS: CPT

## 2025-08-01 ENCOUNTER — OUTPATIENT (OUTPATIENT)
Dept: OUTPATIENT SERVICES | Facility: HOSPITAL | Age: 80
LOS: 1 days | End: 2025-08-01
Payer: MEDICARE

## 2025-08-01 ENCOUNTER — APPOINTMENT (OUTPATIENT)
Dept: MRI IMAGING | Facility: CLINIC | Age: 80
End: 2025-08-01
Payer: MEDICARE

## 2025-08-01 DIAGNOSIS — Z98.890 OTHER SPECIFIED POSTPROCEDURAL STATES: Chronic | ICD-10-CM

## 2025-08-01 DIAGNOSIS — M50.90 CERVICAL DISC DISORDER, UNSPECIFIED, UNSPECIFIED CERVICAL REGION: ICD-10-CM

## 2025-08-01 PROCEDURE — 72141 MRI NECK SPINE W/O DYE: CPT

## 2025-08-01 PROCEDURE — 72141 MRI NECK SPINE W/O DYE: CPT | Mod: 26

## 2025-08-11 ENCOUNTER — APPOINTMENT (OUTPATIENT)
Dept: ENDOCRINOLOGY | Facility: CLINIC | Age: 80
End: 2025-08-11

## 2025-09-04 ENCOUNTER — APPOINTMENT (OUTPATIENT)
Dept: CARDIOLOGY | Facility: CLINIC | Age: 80
End: 2025-09-04

## 2025-09-04 ENCOUNTER — TRANSCRIPTION ENCOUNTER (OUTPATIENT)
Age: 80
End: 2025-09-04

## 2025-09-04 VITALS
BODY MASS INDEX: 25.66 KG/M2 | HEART RATE: 70 BPM | WEIGHT: 159 LBS | SYSTOLIC BLOOD PRESSURE: 128 MMHG | RESPIRATION RATE: 16 BRPM | TEMPERATURE: 98.2 F | DIASTOLIC BLOOD PRESSURE: 58 MMHG | OXYGEN SATURATION: 99 %

## 2025-09-04 DIAGNOSIS — E78.5 HYPERLIPIDEMIA, UNSPECIFIED: ICD-10-CM

## 2025-09-04 DIAGNOSIS — I45.10 UNSPECIFIED RIGHT BUNDLE-BRANCH BLOCK: ICD-10-CM

## 2025-09-04 DIAGNOSIS — I10 ESSENTIAL (PRIMARY) HYPERTENSION: ICD-10-CM

## 2025-09-04 DIAGNOSIS — Z23 ENCOUNTER FOR IMMUNIZATION: ICD-10-CM

## 2025-09-04 DIAGNOSIS — I25.10 ATHEROSCLEROTIC HEART DISEASE OF NATIVE CORONARY ARTERY W/OUT ANGINA PECTORIS: ICD-10-CM

## 2025-09-04 DIAGNOSIS — E11.9 TYPE 2 DIABETES MELLITUS W/OUT COMPLICATIONS: ICD-10-CM

## 2025-09-05 LAB
ALBUMIN SERPL ELPH-MCNC: 4.6 G/DL
ALP BLD-CCNC: 80 U/L
ALT SERPL-CCNC: 18 U/L
ANION GAP SERPL CALC-SCNC: 14 MMOL/L
APO B SERPL-MCNC: 96 MG/DL
AST SERPL-CCNC: 30 U/L
BASOPHILS # BLD AUTO: 0.02 K/UL
BASOPHILS NFR BLD AUTO: 0.4 %
BILIRUB DIRECT SERPL-MCNC: 0.1 MG/DL
BILIRUB INDIRECT SERPL-MCNC: 0.1 MG/DL
BILIRUB SERPL-MCNC: 0.2 MG/DL
BUN SERPL-MCNC: 20 MG/DL
CALCIUM SERPL-MCNC: 10.3 MG/DL
CHLORIDE SERPL-SCNC: 104 MMOL/L
CHOLEST SERPL-MCNC: 209 MG/DL
CK SERPL-CCNC: 552 U/L
CO2 SERPL-SCNC: 23 MMOL/L
CREAT SERPL-MCNC: 1.33 MG/DL
CRP SERPL HS-MCNC: 1.39 MG/L
EGFRCR SERPLBLD CKD-EPI 2021: 54 ML/MIN/1.73M2
EOSINOPHIL # BLD AUTO: 0.1 K/UL
EOSINOPHIL NFR BLD AUTO: 1.8 %
ESTIMATED AVERAGE GLUCOSE: 177 MG/DL
GLUCOSE SERPL-MCNC: 129 MG/DL
HBA1C MFR BLD HPLC: 7.8 %
HCT VFR BLD CALC: 36.4 %
HDLC SERPL-MCNC: 77 MG/DL
HGB BLD-MCNC: 11.5 G/DL
IMM GRANULOCYTES NFR BLD AUTO: 0.2 %
LDLC SERPL DIRECT ASSAY-MCNC: 120 MG/DL
LDLC SERPL-MCNC: 122 MG/DL
LYMPHOCYTES # BLD AUTO: 1.38 K/UL
LYMPHOCYTES NFR BLD AUTO: 25.5 %
MAN DIFF?: NORMAL
MCHC RBC-ENTMCNC: 29.8 PG
MCHC RBC-ENTMCNC: 31.6 G/DL
MCV RBC AUTO: 94.3 FL
MONOCYTES # BLD AUTO: 0.69 K/UL
MONOCYTES NFR BLD AUTO: 12.7 %
NEUTROPHILS # BLD AUTO: 3.22 K/UL
NEUTROPHILS NFR BLD AUTO: 59.4 %
NONHDLC SERPL-MCNC: 132 MG/DL
PLATELET # BLD AUTO: 280 K/UL
POTASSIUM SERPL-SCNC: 6 MMOL/L
PROT SERPL-MCNC: 6.9 G/DL
RBC # BLD: 3.86 M/UL
RBC # FLD: 13.3 %
SODIUM SERPL-SCNC: 141 MMOL/L
TRIGL SERPL-MCNC: 56 MG/DL
WBC # FLD AUTO: 5.42 K/UL

## 2025-09-05 RX ORDER — SODIUM ZIRCONIUM CYCLOSILICATE 10 G/10G
10 POWDER, FOR SUSPENSION ORAL DAILY
Qty: 3 | Refills: 0 | Status: ACTIVE | COMMUNITY
Start: 2025-09-05 | End: 1900-01-01

## (undated) DEVICE — SOLIDIFIER ISOLYZER 2000 CC

## (undated) DEVICE — SOL INJ NS 0.9% 500ML 1-PORT

## (undated) DEVICE — CATH IV SAFE BC 22G X 1" (BLUE)

## (undated) DEVICE — DRSG 2X2

## (undated) DEVICE — BASIN EMESIS 10IN GRADUATED MAUVE

## (undated) DEVICE — FORCEP BIOPSY 2.5MM DISP

## (undated) DEVICE — GOWN LG

## (undated) DEVICE — RETRIEVER ROTH NET PLATINUM-UNIVERSAL

## (undated) DEVICE — Device

## (undated) DEVICE — DRSG CURITY GAUZE SPONGE 4 X 4" 12-PLY

## (undated) DEVICE — DRSG STOCKINETTE IMPERVIOUS MED

## (undated) DEVICE — DRAPE MAYO STAND 30"

## (undated) DEVICE — TUBING SUCTION NONCONDUCTIVE 6MM X 12FT

## (undated) DEVICE — CAPTIVATOR EMR STANDARD SCOPE

## (undated) DEVICE — DRSG OPSITE 13.75 X 4"

## (undated) DEVICE — TUBING CANNULA SALTER LABS NASAL ADULT 7FT

## (undated) DEVICE — BIOPSY FORCEP RADIAL JAW 4 STANDARD WITH NEEDLE

## (undated) DEVICE — BIOPSY FORCEP COLD DISP

## (undated) DEVICE — MARKER ENDO SPOT EX

## (undated) DEVICE — KIT ENDO PROCEDURE CUST W/VLV

## (undated) DEVICE — SENSOR O2 FINGER ADULT 24/CA

## (undated) DEVICE — DRAPE 3/4 SHEET 52X76"

## (undated) DEVICE — ELCTR GROUNDING PAD ADULT COVIDIEN

## (undated) DEVICE — DRAIN BLAKE 15FR BARD CHANNEL

## (undated) DEVICE — SNARE LOOP POLY DISP 30MM LOOP

## (undated) DEVICE — VALVE ENDOSCOPE DEFENDO SINGLE USE

## (undated) DEVICE — DRSG TEGADERM 1.75X1.75"

## (undated) DEVICE — POSITIONER FOAM EGG CRATE ULNAR 2PCS (PINK)

## (undated) DEVICE — ELCTR 4-DISC 20MM 49" (RED, BLUE, GREEN, BLACK)

## (undated) DEVICE — DRAIN JACKSON PRATT 10MM FLAT FULL NO TROCAR

## (undated) DEVICE — FORCEP RADIAL JAW 4 W NDL 2.2MM 2.8MM 240CM ORANGE DISP

## (undated) DEVICE — DRSG STERISTRIPS 0.5 X 4"

## (undated) DEVICE — GOWN TRIMAX LG

## (undated) DEVICE — DRSG BANDAID 0.75X3"

## (undated) DEVICE — SOL IRR POUR NS 0.9% 500ML

## (undated) DEVICE — DRAIN RESERVOIR FOR JACKSON PRATT 100CC CARDINAL

## (undated) DEVICE — CATH ELCTR GLIDE PRB 7FR

## (undated) DEVICE — DRAPE TOWEL BLUE 17" X 24"

## (undated) DEVICE — WARMING BLANKET LOWER ADULT

## (undated) DEVICE — DRSG GAUZE 4X4"

## (undated) DEVICE — DRSG KLING 4"

## (undated) DEVICE — LUBE JELLY FOILPACK 36GM STERILE

## (undated) DEVICE — SALIVA EJECTOR (BLUE)

## (undated) DEVICE — WRAP COMPRESSION CALF MED

## (undated) DEVICE — PACK IV START WITH CHG

## (undated) DEVICE — TUBING IV SET GRAVITY 3Y 100" MACRO

## (undated) DEVICE — TUBING MEDI-VAC W MAXIGRIP CONNECTORS 1/4"X6'

## (undated) DEVICE — TUBING ENDO EXT OLYMPUS 160 24HR USE GI

## (undated) DEVICE — SPEAR SURG EYE WECK-CELL CELOS

## (undated) DEVICE — CATH BLLN ULTRASONIC ENSOSCOPE

## (undated) DEVICE — CLAMP BX HOT RAD JAW 3

## (undated) DEVICE — GAMMA SLEEVE DISPOSABLE

## (undated) DEVICE — SUT SURGIPRO II 4-0 18" P-12

## (undated) DEVICE — BITE BLOCK SCOPE SAVER 20X27MM ADULT GREEN

## (undated) DEVICE — PREP CHLORAPREP HI-LITE ORANGE 26ML

## (undated) DEVICE — MEDICATION LABELS W MARKER

## (undated) DEVICE — LUBRICATING JELLY HR ONE SHOT 3G

## (undated) DEVICE — ELCTR ECG CONDUCTIVE ADHESIVE

## (undated) DEVICE — SNARE CAPTIVATOR COLD RND STIFF 10X2.4X2.8MM 240CM

## (undated) DEVICE — FORMALIN CUPS 10% BUFFERED

## (undated) DEVICE — FOLEY TRAY 16FR 5CC LTX UMETER CLOSED

## (undated) DEVICE — DRSG DERMABOND MINI

## (undated) DEVICE — SPECIMEN CONTAINER 100ML

## (undated) DEVICE — DRAPE INSTRUMENT POUCH 6.75" X 11"

## (undated) DEVICE — DRSG STOCKINETTE IMPERVIOUS XL

## (undated) DEVICE — VENODYNE/SCD SLEEVE CALF MEDIUM

## (undated) DEVICE — CONTAINER FORMALIN 10% 20ML

## (undated) DEVICE — SOL IRR POUR H2O 250ML

## (undated) DEVICE — LINE BREATHE SAMPLNG

## (undated) DEVICE — LIGASURE SMALL JAW

## (undated) DEVICE — BLADE SCALPEL SAFETYLOCK #10

## (undated) DEVICE — DRSG TEGADERM 6"X8"

## (undated) DEVICE — DRSG VAC WHITEFOAM LARGE (WHITE)

## (undated) DEVICE — STAPLER SKIN VISI-STAT 35 WIDE

## (undated) DEVICE — DRSG CURITY GAUZE SPONGE 4 X 4" 12-PLY NON-STERILE

## (undated) DEVICE — RADIAL JAW 4 240CM WITH NDL

## (undated) DEVICE — SOLIDIFIER CANN EXPRESS 3K

## (undated) DEVICE — DRAPE 1/2 SHEET 40X57"

## (undated) DEVICE — BLADE SCALPEL SAFETYLOCK #15

## (undated) DEVICE — SUT POLYSORB 3-0 30" V-20 UNDYED

## (undated) DEVICE — MARKING PEN W RULER

## (undated) DEVICE — BITE BLOCK MOUTHPCW/STRAP

## (undated) DEVICE — DRAPE SPLIT SHEET 77" X 108"

## (undated) DEVICE — ADAPTER ENDO CHNL SINGLE USE

## (undated) DEVICE — DRSG XEROFORM 5 X 9"

## (undated) DEVICE — ELCTR ROCKER SWITCH PENCIL BLUE 10FT

## (undated) DEVICE — DRSG COBAN 4"

## (undated) DEVICE — DRSG ADAPTIC 3 X 8"

## (undated) DEVICE — SYR LUER LOK 50CC

## (undated) DEVICE — NDL INJ SCLERO INTERJECT 23G

## (undated) DEVICE — GLV 7 PROTEXIS (WHITE)

## (undated) DEVICE — WARMING BLANKET UPPER ADULT

## (undated) DEVICE — DRSG COMBINE 5X9"